# Patient Record
Sex: MALE | Race: ASIAN | NOT HISPANIC OR LATINO | URBAN - METROPOLITAN AREA
[De-identification: names, ages, dates, MRNs, and addresses within clinical notes are randomized per-mention and may not be internally consistent; named-entity substitution may affect disease eponyms.]

---

## 2023-09-02 ENCOUNTER — EMERGENCY (EMERGENCY)
Facility: HOSPITAL | Age: 53
LOS: 1 days | Discharge: HOME | End: 2023-09-02
Attending: STUDENT IN AN ORGANIZED HEALTH CARE EDUCATION/TRAINING PROGRAM | Admitting: STUDENT IN AN ORGANIZED HEALTH CARE EDUCATION/TRAINING PROGRAM
Payer: MEDICAID

## 2023-09-02 VITALS
DIASTOLIC BLOOD PRESSURE: 89 MMHG | SYSTOLIC BLOOD PRESSURE: 136 MMHG | OXYGEN SATURATION: 100 % | RESPIRATION RATE: 18 BRPM | HEART RATE: 81 BPM | TEMPERATURE: 98 F

## 2023-09-02 VITALS
RESPIRATION RATE: 18 BRPM | DIASTOLIC BLOOD PRESSURE: 97 MMHG | SYSTOLIC BLOOD PRESSURE: 142 MMHG | TEMPERATURE: 99 F | HEART RATE: 110 BPM | OXYGEN SATURATION: 98 %

## 2023-09-02 LAB
ALBUMIN SERPL ELPH-MCNC: 3.8 G/DL — SIGNIFICANT CHANGE UP (ref 3.3–5)
ALP SERPL-CCNC: 75 U/L — SIGNIFICANT CHANGE UP (ref 40–120)
ALT FLD-CCNC: 23 U/L — SIGNIFICANT CHANGE UP (ref 4–41)
ANION GAP SERPL CALC-SCNC: 15 MMOL/L — HIGH (ref 7–14)
APTT BLD: 30.9 SEC — SIGNIFICANT CHANGE UP (ref 24.5–35.6)
AST SERPL-CCNC: 20 U/L — SIGNIFICANT CHANGE UP (ref 4–40)
BASOPHILS # BLD AUTO: 0.02 K/UL — SIGNIFICANT CHANGE UP (ref 0–0.2)
BASOPHILS NFR BLD AUTO: 0.3 % — SIGNIFICANT CHANGE UP (ref 0–2)
BILIRUB SERPL-MCNC: 0.3 MG/DL — SIGNIFICANT CHANGE UP (ref 0.2–1.2)
BUN SERPL-MCNC: 13 MG/DL — SIGNIFICANT CHANGE UP (ref 7–23)
CALCIUM SERPL-MCNC: 9.2 MG/DL — SIGNIFICANT CHANGE UP (ref 8.4–10.5)
CHLORIDE SERPL-SCNC: 102 MMOL/L — SIGNIFICANT CHANGE UP (ref 98–107)
CO2 SERPL-SCNC: 19 MMOL/L — LOW (ref 22–31)
CREAT SERPL-MCNC: 1.06 MG/DL — SIGNIFICANT CHANGE UP (ref 0.5–1.3)
EGFR: 86 ML/MIN/1.73M2 — SIGNIFICANT CHANGE UP
EOSINOPHIL # BLD AUTO: 0.15 K/UL — SIGNIFICANT CHANGE UP (ref 0–0.5)
EOSINOPHIL NFR BLD AUTO: 2 % — SIGNIFICANT CHANGE UP (ref 0–6)
GLUCOSE SERPL-MCNC: 97 MG/DL — SIGNIFICANT CHANGE UP (ref 70–99)
HCT VFR BLD CALC: 42.1 % — SIGNIFICANT CHANGE UP (ref 39–50)
HGB BLD-MCNC: 14.5 G/DL — SIGNIFICANT CHANGE UP (ref 13–17)
IANC: 4.58 K/UL — SIGNIFICANT CHANGE UP (ref 1.8–7.4)
IMM GRANULOCYTES NFR BLD AUTO: 0.3 % — SIGNIFICANT CHANGE UP (ref 0–0.9)
INR BLD: 1.01 RATIO — SIGNIFICANT CHANGE UP (ref 0.85–1.18)
LYMPHOCYTES # BLD AUTO: 2.23 K/UL — SIGNIFICANT CHANGE UP (ref 1–3.3)
LYMPHOCYTES # BLD AUTO: 29.1 % — SIGNIFICANT CHANGE UP (ref 13–44)
MCHC RBC-ENTMCNC: 26.6 PG — LOW (ref 27–34)
MCHC RBC-ENTMCNC: 34.4 GM/DL — SIGNIFICANT CHANGE UP (ref 32–36)
MCV RBC AUTO: 77.2 FL — LOW (ref 80–100)
MONOCYTES # BLD AUTO: 0.67 K/UL — SIGNIFICANT CHANGE UP (ref 0–0.9)
MONOCYTES NFR BLD AUTO: 8.7 % — SIGNIFICANT CHANGE UP (ref 2–14)
NEUTROPHILS # BLD AUTO: 4.58 K/UL — SIGNIFICANT CHANGE UP (ref 1.8–7.4)
NEUTROPHILS NFR BLD AUTO: 59.6 % — SIGNIFICANT CHANGE UP (ref 43–77)
NRBC # BLD: 0 /100 WBCS — SIGNIFICANT CHANGE UP (ref 0–0)
NRBC # FLD: 0 K/UL — SIGNIFICANT CHANGE UP (ref 0–0)
NT-PROBNP SERPL-SCNC: 214 PG/ML — SIGNIFICANT CHANGE UP
PLATELET # BLD AUTO: 312 K/UL — SIGNIFICANT CHANGE UP (ref 150–400)
POTASSIUM SERPL-MCNC: 3.8 MMOL/L — SIGNIFICANT CHANGE UP (ref 3.5–5.3)
POTASSIUM SERPL-SCNC: 3.8 MMOL/L — SIGNIFICANT CHANGE UP (ref 3.5–5.3)
PROT SERPL-MCNC: 8.3 G/DL — SIGNIFICANT CHANGE UP (ref 6–8.3)
PROTHROM AB SERPL-ACNC: 11.4 SEC — SIGNIFICANT CHANGE UP (ref 9.5–13)
RBC # BLD: 5.45 M/UL — SIGNIFICANT CHANGE UP (ref 4.2–5.8)
RBC # FLD: 12.4 % — SIGNIFICANT CHANGE UP (ref 10.3–14.5)
SODIUM SERPL-SCNC: 136 MMOL/L — SIGNIFICANT CHANGE UP (ref 135–145)
TROPONIN T, HIGH SENSITIVITY RESULT: 7 NG/L — SIGNIFICANT CHANGE UP
WBC # BLD: 7.67 K/UL — SIGNIFICANT CHANGE UP (ref 3.8–10.5)
WBC # FLD AUTO: 7.67 K/UL — SIGNIFICANT CHANGE UP (ref 3.8–10.5)

## 2023-09-02 PROCEDURE — 99285 EMERGENCY DEPT VISIT HI MDM: CPT

## 2023-09-02 PROCEDURE — 93010 ELECTROCARDIOGRAM REPORT: CPT

## 2023-09-02 NOTE — ED PROVIDER NOTE - OBJECTIVE STATEMENT
51 y/o M PMH HTN c/o CP x 1-2 months. Pt states he had covid in June, began having CP, referred to pulmonary by his PCP. Pt had PFTs done (admits to syncope while performing PFTs), echo (unremarkable per pt), and CT of his chest ordered. Pt notes he developed swelling to his chest in mid July after pulm appt, notes swelling and pain have not changed over the past month and a half. Pt had CT performed 3 days ago, told he has a blood clot and advised to follow up with Dr Adolph Renteria (CT sx). While trying to make appt with Dr Renteria,  advised pt to come to ED today. Pt does not have disc or results of CT scan with him. Denies fever, chills, SOB, cough, le edema, h/o dvt/pe.

## 2023-09-02 NOTE — ED PROVIDER NOTE - NSFOLLOWUPINSTRUCTIONS_ED_ALL_ED_FT
You are leaving against medical advice. Please follow up with your PMD and any other specialist you have been advised to follow up with within 1-2 days. Please return to the emergency department at any time for evaluation or any new or worsening symptoms.

## 2023-09-02 NOTE — ED PROVIDER NOTE - ATTENDING APP SHARED VISIT CONTRIBUTION OF CARE
Hector Lopez DO:  patient seen and evaluated with the PA.  I was present for key portions of the History & Physical, and I agree with the Impression & Plan. YOM, PMH HTN, PW CP.  Patient reports 2 months ago had COVID.  Instead of following with pulmonologist, had PFT testing at which she had syncope.  Patient reports she had TTE this year which was reported normal.  Patient reports recently had a CT scan, unclear if chest abdomen or pelvis, told he had a blood clot and to follow-up with a vascular surgeon.  Patient contacted his office to attempt to get follow-up however after several failed attempts was told to come to ER.  Patient did not have these scans done in [facility].  States he is not able to obtain them over the weekend or no one can bring them to the hospital.  Patient has a set OB, N/V, CP, cough, congestion, leg swelling, history of DVT/PE.  Denies history of ACS.  Patient is HDS, well-appearing, neurovascular tach.  EKG NSR, nonischemic, independent reviewed by me.  PE as noted.  Very unlikely for this to be PE given his PCP was aware of it and did not start him on any sort of medication or send him to ER upon reading the CAT scan.  Mass over chest appears to be cyst, do not suspect acute abscess or infection.  Atypical for ACS.  Labs, imaging, reassess.  Eval for PE.  However once again this is a low suspicion.  Patient having no abdominal symptoms.

## 2023-09-02 NOTE — ED ADULT NURSE NOTE - OBJECTIVE STATEMENT
Received patient in Intake 4 c/o Received patient in Intake 4 c/o chest pain for 1-2 months, patient reports swelling of chest. Patient denies fever, chills, SOB. Patient is A&OX4, airway patent, breathing unlabored and even, radial pulses palpable. Labs obtained, 20G IV placed on left arm, awaiting CT scan. Side rails up and safety maintained. Call bells within reach.

## 2023-09-02 NOTE — ED PROVIDER NOTE - PATIENT PORTAL LINK FT
You can access the FollowMyHealth Patient Portal offered by [facility] by registering at the following website: [email or web address]. By joining [employer]’s FollowMyHealth portal, you will also be able to view your health information using other applications (apps) compatible with our system.

## 2023-09-02 NOTE — ED PROVIDER NOTE - CLINICAL SUMMARY MEDICAL DECISION MAKING FREE TEXT BOX
pt with 1-2 months of CP, told he has a blood clot outpt, also with small area of fluctuance overlying superficial mid chest  -cbc, cmp, trop, pro-bnp, cta chest; reassess

## 2023-09-02 NOTE — ED ADULT NURSE NOTE - NSFALLUNIVINTERV_ED_ALL_ED
Bed/Stretcher in lowest position, wheels locked, appropriate side rails in place/Call bell, personal items and telephone in reach/Instruct patient to call for assistance before getting out of bed/chair/stretcher/Non-slip footwear applied when patient is off stretcher/[place] to call system/Physically safe environment - no spills, clutter or unnecessary equipment/Purposeful proactive rounding/Room/bathroom lighting operational, light cord in reach

## 2023-09-02 NOTE — ED ADULT TRIAGE NOTE - CHIEF COMPLAINT QUOTE
has been getting more swollen last month has been getting more swollen last month had episode of passing out last month while doing a -->pulmonary test?

## 2023-09-02 NOTE — ED ADULT NURSE NOTE - CHIEF COMPLAINT QUOTE
has been getting more swollen last month had episode of passing out last month while doing a -->pulmonary test?

## 2023-09-02 NOTE — ED PROVIDER NOTE - PHYSICAL EXAMINATION
General: well appearing, interactive, well nourished, no apparent distress, ncat  HEENT: EOMI, PERRLA, normal mucosa, normal oropharynx, no lesions on the lips or on oral mucosa, normal external ear  Neck: supple, no lymphadenopathy, full range of motion, no nuchal rigidity  CV: RRR, normal S1 and S2 with no murmur, capillary refill less than two seconds, pp 2+   Resp: lungs CTA b/l, good aeration bilaterally, symmetric chest wall   Abd: non-distended, soft, non-tender  : no CVA tenderness  MSK: full range of motion, no cyanosis, no edema, no clubbing, no immobility  Neuro: CN II-XII grossly intact, muscle strength 5/5 in all extremities, normal gait  Skin: 2 cm fluctuant lesion over manubrium, no surrounding erythema/crepitus

## 2023-09-02 NOTE — ED PROVIDER NOTE - CARDIAC, MLM
Normal rate, regular rhythm.  Heart sounds S1, S2.  No murmurs, rubs or gallops. 2x2xm area of fluctuance with mild surrounding edema, very mild central erythema without induration or warmth.

## 2023-09-05 PROBLEM — I10 ESSENTIAL (PRIMARY) HYPERTENSION: Chronic | Status: ACTIVE | Noted: 2023-09-02

## 2023-09-11 PROBLEM — Z00.00 ENCOUNTER FOR PREVENTIVE HEALTH EXAMINATION: Status: ACTIVE | Noted: 2023-09-11

## 2023-09-13 PROBLEM — Z86.39 HISTORY OF HYPERLIPIDEMIA: Status: RESOLVED | Noted: 2023-09-13 | Resolved: 2023-09-13

## 2023-09-13 PROBLEM — Z86.11 PERSONAL HISTORY OF TUBERCULOSIS: Status: RESOLVED | Noted: 2023-09-13 | Resolved: 2023-09-13

## 2023-09-13 PROBLEM — Z86.79 HISTORY OF HYPERTENSION: Status: RESOLVED | Noted: 2023-09-13 | Resolved: 2023-09-13

## 2023-09-14 ENCOUNTER — TRANSCRIPTION ENCOUNTER (OUTPATIENT)
Age: 53
End: 2023-09-14

## 2023-09-14 ENCOUNTER — INPATIENT (INPATIENT)
Facility: HOSPITAL | Age: 53
LOS: 7 days | Discharge: HOME HEALTH | End: 2023-09-22
Attending: STUDENT IN AN ORGANIZED HEALTH CARE EDUCATION/TRAINING PROGRAM | Admitting: STUDENT IN AN ORGANIZED HEALTH CARE EDUCATION/TRAINING PROGRAM
Payer: MEDICAID

## 2023-09-14 ENCOUNTER — APPOINTMENT (OUTPATIENT)
Dept: THORACIC SURGERY | Facility: CLINIC | Age: 53
End: 2023-09-14
Payer: MEDICAID

## 2023-09-14 VITALS
TEMPERATURE: 98 F | HEART RATE: 88 BPM | RESPIRATION RATE: 15 BRPM | SYSTOLIC BLOOD PRESSURE: 122 MMHG | DIASTOLIC BLOOD PRESSURE: 85 MMHG | OXYGEN SATURATION: 100 %

## 2023-09-14 VITALS
BODY MASS INDEX: 33.63 KG/M2 | HEIGHT: 64 IN | TEMPERATURE: 96.8 F | OXYGEN SATURATION: 99 % | SYSTOLIC BLOOD PRESSURE: 122 MMHG | DIASTOLIC BLOOD PRESSURE: 89 MMHG | WEIGHT: 197 LBS | RESPIRATION RATE: 16 BRPM

## 2023-09-14 DIAGNOSIS — Z77.22 CONTACT WITH AND (SUSPECTED) EXPOSURE TO ENVIRONMENTAL TOBACCO SMOKE (ACUTE) (CHRONIC): ICD-10-CM

## 2023-09-14 DIAGNOSIS — Z86.39 PERSONAL HISTORY OF OTHER ENDOCRINE, NUTRITIONAL AND METABOLIC DISEASE: ICD-10-CM

## 2023-09-14 DIAGNOSIS — Z86.79 PERSONAL HISTORY OF OTHER DISEASES OF THE CIRCULATORY SYSTEM: ICD-10-CM

## 2023-09-14 DIAGNOSIS — Z82.49 FAMILY HISTORY OF ISCHEMIC HEART DISEASE AND OTHER DISEASES OF THE CIRCULATORY SYSTEM: ICD-10-CM

## 2023-09-14 DIAGNOSIS — Z78.9 OTHER SPECIFIED HEALTH STATUS: ICD-10-CM

## 2023-09-14 DIAGNOSIS — Z86.11 PERSONAL HISTORY OF TUBERCULOSIS: ICD-10-CM

## 2023-09-14 PROCEDURE — 99024 POSTOP FOLLOW-UP VISIT: CPT

## 2023-09-14 PROCEDURE — 99285 EMERGENCY DEPT VISIT HI MDM: CPT

## 2023-09-14 RX ORDER — PIPERACILLIN AND TAZOBACTAM 4; .5 G/20ML; G/20ML
3.38 INJECTION, POWDER, LYOPHILIZED, FOR SOLUTION INTRAVENOUS ONCE
Refills: 0 | Status: COMPLETED | OUTPATIENT
Start: 2023-09-14 | End: 2023-09-14

## 2023-09-14 RX ORDER — VANCOMYCIN HCL 1 G
1000 VIAL (EA) INTRAVENOUS ONCE
Refills: 0 | Status: COMPLETED | OUTPATIENT
Start: 2023-09-14 | End: 2023-09-14

## 2023-09-14 NOTE — ED ADULT TRIAGE NOTE - CHIEF COMPLAINT QUOTE
presents C/O mass on chest. sent in for CT. by dr. Renteria. No complaints of chest pain, headache, nausea, dizziness, vomiting  SOB, fever, chills verbalized. phx HTN.

## 2023-09-14 NOTE — ED PROVIDER NOTE - PROGRESS NOTE DETAILS
Cristela Montoya DO PGY-3  Spoke with thoracic surgery PA who states that the patient is known to Dr. Adolph Renteria who would like him to be admitted to surgery floor for concern for infected hematoma/chest abscess. Recommending CT chest with IV contrast, pre-op labs, antiobiotics vanc/zosyn. Patient is to go to the OR tomorrow morning.

## 2023-09-14 NOTE — ED PROVIDER NOTE - CLINICAL SUMMARY MEDICAL DECISION MAKING FREE TEXT BOX
Cristela Montoya DO PGY-3  52-year-old male with history of hypertension, chest wall hematoma after syncopized in pulmonologist office several months ago while performing PFTs and thereafter developed chest wall hematoma, presents with increasing pain, warmth and sent to the ER for admission to cardiothoracic surgery for incision and drainage tomorrow.  Discussed with CT surgery PA who recommends preop testing, antibiotics Vanco Zosyn, CT chest with contrast. Patient is hemodynamically stable overall, nontoxic-appearing. Cristela Montoya DO PGY-3  52-year-old male with history of hypertension, chest wall hematoma after syncopized in pulmonologist office several months ago while performing PFTs and thereafter developed chest wall hematoma, presents with increasing pain, warmth and sent to the ER for admission to cardiothoracic surgery for incision and drainage tomorrow. Hemodynamically stable, nontoxic-appearing, temp 99.  Chest wall with 4 cm x 4 cm well-circumscribed Warm fluctuance consistent with hematoma, no overlying skin erythema, induration, crepitus. Differential includes but not limited to hematoma, infected hematoma, abscess. Discussed with CT surgery PA who recommends preop testing, antibiotics Vanco Zosyn, CT chest with contrast. Will admit to CT surgery.

## 2023-09-14 NOTE — ED PROVIDER NOTE - ATTENDING CONTRIBUTION TO CARE
Noted about 4x4 area of eryythema, fluctuance, ttp, induration. Concern for abscess. Sent in by thoracic surgeon to be admitted. Labs are reviewed - no acute interventions needed in the ed. CT pending - pt is signed out

## 2023-09-15 ENCOUNTER — TRANSCRIPTION ENCOUNTER (OUTPATIENT)
Age: 53
End: 2023-09-15

## 2023-09-15 ENCOUNTER — RESULT REVIEW (OUTPATIENT)
Age: 53
End: 2023-09-15

## 2023-09-15 DIAGNOSIS — T14.8XXA OTHER INJURY OF UNSPECIFIED BODY REGION, INITIAL ENCOUNTER: ICD-10-CM

## 2023-09-15 PROBLEM — Z77.22 HISTORY OF SECOND HAND SMOKE EXPOSURE: Status: ACTIVE | Noted: 2023-09-15

## 2023-09-15 PROBLEM — Z82.49 FAMILY HISTORY OF CARDIAC DISORDER: Status: ACTIVE | Noted: 2023-09-15

## 2023-09-15 PROBLEM — Z78.9 ALCOHOL USE: Status: ACTIVE | Noted: 2023-09-15

## 2023-09-15 LAB
ALBUMIN SERPL ELPH-MCNC: 4.3 G/DL — SIGNIFICANT CHANGE UP (ref 3.3–5)
ALP SERPL-CCNC: 76 U/L — SIGNIFICANT CHANGE UP (ref 40–120)
ALT FLD-CCNC: 28 U/L — SIGNIFICANT CHANGE UP (ref 4–41)
ANION GAP SERPL CALC-SCNC: 16 MMOL/L — HIGH (ref 7–14)
APTT BLD: 29.5 SEC — SIGNIFICANT CHANGE UP (ref 24.5–35.6)
AST SERPL-CCNC: 19 U/L — SIGNIFICANT CHANGE UP (ref 4–40)
BASE EXCESS BLDV CALC-SCNC: -2 MMOL/L — SIGNIFICANT CHANGE UP (ref -2–3)
BASOPHILS # BLD AUTO: 0.04 K/UL — SIGNIFICANT CHANGE UP (ref 0–0.2)
BASOPHILS NFR BLD AUTO: 0.5 % — SIGNIFICANT CHANGE UP (ref 0–2)
BILIRUB SERPL-MCNC: 0.7 MG/DL — SIGNIFICANT CHANGE UP (ref 0.2–1.2)
BLD GP AB SCN SERPL QL: NEGATIVE — SIGNIFICANT CHANGE UP
BLD GP AB SCN SERPL QL: NEGATIVE — SIGNIFICANT CHANGE UP
BLOOD GAS VENOUS COMPREHENSIVE RESULT: SIGNIFICANT CHANGE UP
BUN SERPL-MCNC: 15 MG/DL — SIGNIFICANT CHANGE UP (ref 7–23)
CALCIUM SERPL-MCNC: 9.7 MG/DL — SIGNIFICANT CHANGE UP (ref 8.4–10.5)
CHLORIDE BLDV-SCNC: 102 MMOL/L — SIGNIFICANT CHANGE UP (ref 96–108)
CHLORIDE SERPL-SCNC: 98 MMOL/L — SIGNIFICANT CHANGE UP (ref 98–107)
CO2 BLDV-SCNC: 25 MMOL/L — SIGNIFICANT CHANGE UP (ref 22–26)
CO2 SERPL-SCNC: 22 MMOL/L — SIGNIFICANT CHANGE UP (ref 22–31)
CREAT SERPL-MCNC: 1.03 MG/DL — SIGNIFICANT CHANGE UP (ref 0.5–1.3)
EGFR: 88 ML/MIN/1.73M2 — SIGNIFICANT CHANGE UP
EOSINOPHIL # BLD AUTO: 0.16 K/UL — SIGNIFICANT CHANGE UP (ref 0–0.5)
EOSINOPHIL NFR BLD AUTO: 2.1 % — SIGNIFICANT CHANGE UP (ref 0–6)
FLUAV AG NPH QL: SIGNIFICANT CHANGE UP
FLUBV AG NPH QL: SIGNIFICANT CHANGE UP
GAS PNL BLDV: 135 MMOL/L — LOW (ref 136–145)
GLUCOSE BLDV-MCNC: 96 MG/DL — SIGNIFICANT CHANGE UP (ref 70–99)
GLUCOSE SERPL-MCNC: 92 MG/DL — SIGNIFICANT CHANGE UP (ref 70–99)
HCO3 BLDV-SCNC: 24 MMOL/L — SIGNIFICANT CHANGE UP (ref 22–29)
HCT VFR BLD CALC: 43.1 % — SIGNIFICANT CHANGE UP (ref 39–50)
HCT VFR BLDA CALC: 44 % — SIGNIFICANT CHANGE UP (ref 39–51)
HGB BLD CALC-MCNC: 14.8 G/DL — SIGNIFICANT CHANGE UP (ref 12.6–17.4)
HGB BLD-MCNC: 14.6 G/DL — SIGNIFICANT CHANGE UP (ref 13–17)
IANC: 4.53 K/UL — SIGNIFICANT CHANGE UP (ref 1.8–7.4)
IMM GRANULOCYTES NFR BLD AUTO: 0.3 % — SIGNIFICANT CHANGE UP (ref 0–0.9)
INR BLD: 1.06 RATIO — SIGNIFICANT CHANGE UP (ref 0.85–1.18)
LACTATE BLDV-MCNC: 1.7 MMOL/L — SIGNIFICANT CHANGE UP (ref 0.5–2)
LYMPHOCYTES # BLD AUTO: 2.26 K/UL — SIGNIFICANT CHANGE UP (ref 1–3.3)
LYMPHOCYTES # BLD AUTO: 29.9 % — SIGNIFICANT CHANGE UP (ref 13–44)
MCHC RBC-ENTMCNC: 26.5 PG — LOW (ref 27–34)
MCHC RBC-ENTMCNC: 33.9 GM/DL — SIGNIFICANT CHANGE UP (ref 32–36)
MCV RBC AUTO: 78.4 FL — LOW (ref 80–100)
MONOCYTES # BLD AUTO: 0.54 K/UL — SIGNIFICANT CHANGE UP (ref 0–0.9)
MONOCYTES NFR BLD AUTO: 7.2 % — SIGNIFICANT CHANGE UP (ref 2–14)
NEUTROPHILS # BLD AUTO: 4.53 K/UL — SIGNIFICANT CHANGE UP (ref 1.8–7.4)
NEUTROPHILS NFR BLD AUTO: 60 % — SIGNIFICANT CHANGE UP (ref 43–77)
NRBC # BLD: 0 /100 WBCS — SIGNIFICANT CHANGE UP (ref 0–0)
NRBC # FLD: 0 K/UL — SIGNIFICANT CHANGE UP (ref 0–0)
PCO2 BLDV: 43 MMHG — SIGNIFICANT CHANGE UP (ref 42–55)
PH BLDV: 7.35 — SIGNIFICANT CHANGE UP (ref 7.32–7.43)
PLATELET # BLD AUTO: 335 K/UL — SIGNIFICANT CHANGE UP (ref 150–400)
PO2 BLDV: 25 MMHG — SIGNIFICANT CHANGE UP (ref 25–45)
POTASSIUM BLDV-SCNC: 3.7 MMOL/L — SIGNIFICANT CHANGE UP (ref 3.5–5.1)
POTASSIUM SERPL-MCNC: 3.7 MMOL/L — SIGNIFICANT CHANGE UP (ref 3.5–5.3)
POTASSIUM SERPL-SCNC: 3.7 MMOL/L — SIGNIFICANT CHANGE UP (ref 3.5–5.3)
PROT SERPL-MCNC: 7.9 G/DL — SIGNIFICANT CHANGE UP (ref 6–8.3)
PROTHROM AB SERPL-ACNC: 11.8 SEC — SIGNIFICANT CHANGE UP (ref 9.5–13)
RBC # BLD: 5.5 M/UL — SIGNIFICANT CHANGE UP (ref 4.2–5.8)
RBC # FLD: 13 % — SIGNIFICANT CHANGE UP (ref 10.3–14.5)
RH IG SCN BLD-IMP: POSITIVE — SIGNIFICANT CHANGE UP
RH IG SCN BLD-IMP: POSITIVE — SIGNIFICANT CHANGE UP
RSV RNA NPH QL NAA+NON-PROBE: SIGNIFICANT CHANGE UP
SAO2 % BLDV: 34.7 % — LOW (ref 67–88)
SARS-COV-2 RNA SPEC QL NAA+PROBE: SIGNIFICANT CHANGE UP
SODIUM SERPL-SCNC: 136 MMOL/L — SIGNIFICANT CHANGE UP (ref 135–145)
WBC # BLD: 7.55 K/UL — SIGNIFICANT CHANGE UP (ref 3.8–10.5)
WBC # FLD AUTO: 7.55 K/UL — SIGNIFICANT CHANGE UP (ref 3.8–10.5)

## 2023-09-15 PROCEDURE — 21501 I&D DP ABSC/HMTMA SFT TS NCK: CPT

## 2023-09-15 PROCEDURE — 71260 CT THORAX DX C+: CPT | Mod: 26

## 2023-09-15 PROCEDURE — 88304 TISSUE EXAM BY PATHOLOGIST: CPT | Mod: 26

## 2023-09-15 RX ORDER — ONDANSETRON 8 MG/1
4 TABLET, FILM COATED ORAL ONCE
Refills: 0 | Status: DISCONTINUED | OUTPATIENT
Start: 2023-09-15 | End: 2023-09-16

## 2023-09-15 RX ORDER — VANCOMYCIN HCL 1 G
VIAL (EA) INTRAVENOUS
Refills: 0 | Status: DISCONTINUED | OUTPATIENT
Start: 2023-09-15 | End: 2023-09-15

## 2023-09-15 RX ORDER — OLMESARTAN MEDOXOMIL 40 MG/1
TABLET, FILM COATED ORAL
Refills: 0 | Status: ACTIVE | COMMUNITY

## 2023-09-15 RX ORDER — HEPARIN SODIUM 5000 [USP'U]/ML
5000 INJECTION INTRAVENOUS; SUBCUTANEOUS EVERY 8 HOURS
Refills: 0 | Status: DISCONTINUED | OUTPATIENT
Start: 2023-09-15 | End: 2023-09-22

## 2023-09-15 RX ORDER — VANCOMYCIN HCL 1 G
1250 VIAL (EA) INTRAVENOUS EVERY 12 HOURS
Refills: 0 | Status: DISCONTINUED | OUTPATIENT
Start: 2023-09-15 | End: 2023-09-15

## 2023-09-15 RX ORDER — FENTANYL CITRATE 50 UG/ML
25 INJECTION INTRAVENOUS
Refills: 0 | Status: DISCONTINUED | OUTPATIENT
Start: 2023-09-15 | End: 2023-09-16

## 2023-09-15 RX ORDER — INFLUENZA VIRUS VACCINE 15; 15; 15; 15 UG/.5ML; UG/.5ML; UG/.5ML; UG/.5ML
0.5 SUSPENSION INTRAMUSCULAR ONCE
Refills: 0 | Status: DISCONTINUED | OUTPATIENT
Start: 2023-09-15 | End: 2023-09-20

## 2023-09-15 RX ORDER — ATORVASTATIN CALCIUM 80 MG/1
20 TABLET, FILM COATED ORAL AT BEDTIME
Refills: 0 | Status: DISCONTINUED | OUTPATIENT
Start: 2023-09-15 | End: 2023-09-22

## 2023-09-15 RX ORDER — OLMESARTAN MEDOXOMIL 5 MG/1
1 TABLET, FILM COATED ORAL
Refills: 0 | DISCHARGE

## 2023-09-15 RX ORDER — ACETAMINOPHEN 500 MG
650 TABLET ORAL EVERY 6 HOURS
Refills: 0 | Status: DISCONTINUED | OUTPATIENT
Start: 2023-09-15 | End: 2023-09-22

## 2023-09-15 RX ORDER — ATORVASTATIN CALCIUM 80 MG/1
1 TABLET, FILM COATED ORAL
Refills: 0 | DISCHARGE

## 2023-09-15 RX ORDER — PIPERACILLIN AND TAZOBACTAM 4; .5 G/20ML; G/20ML
3.38 INJECTION, POWDER, LYOPHILIZED, FOR SOLUTION INTRAVENOUS EVERY 8 HOURS
Refills: 0 | Status: DISCONTINUED | OUTPATIENT
Start: 2023-09-15 | End: 2023-09-18

## 2023-09-15 RX ORDER — OXYCODONE HYDROCHLORIDE 5 MG/1
5 TABLET ORAL EVERY 4 HOURS
Refills: 0 | Status: DISCONTINUED | OUTPATIENT
Start: 2023-09-15 | End: 2023-09-22

## 2023-09-15 RX ORDER — VANCOMYCIN HCL 1 G
1250 VIAL (EA) INTRAVENOUS ONCE
Refills: 0 | Status: DISCONTINUED | OUTPATIENT
Start: 2023-09-15 | End: 2023-09-15

## 2023-09-15 RX ORDER — SODIUM CHLORIDE 9 MG/ML
1000 INJECTION INTRAMUSCULAR; INTRAVENOUS; SUBCUTANEOUS
Refills: 0 | Status: DISCONTINUED | OUTPATIENT
Start: 2023-09-15 | End: 2023-09-16

## 2023-09-15 RX ORDER — LOSARTAN POTASSIUM 100 MG/1
50 TABLET, FILM COATED ORAL DAILY
Refills: 0 | Status: DISCONTINUED | OUTPATIENT
Start: 2023-09-15 | End: 2023-09-22

## 2023-09-15 RX ORDER — HYDROMORPHONE HYDROCHLORIDE 2 MG/ML
0.5 INJECTION INTRAMUSCULAR; INTRAVENOUS; SUBCUTANEOUS
Refills: 0 | Status: DISCONTINUED | OUTPATIENT
Start: 2023-09-15 | End: 2023-09-16

## 2023-09-15 RX ORDER — IBUPROFEN 600 MG/1
600 TABLET, FILM COATED ORAL
Refills: 0 | Status: ACTIVE | COMMUNITY

## 2023-09-15 RX ORDER — VANCOMYCIN HCL 1 G
1250 VIAL (EA) INTRAVENOUS EVERY 12 HOURS
Refills: 0 | Status: DISCONTINUED | OUTPATIENT
Start: 2023-09-15 | End: 2023-09-18

## 2023-09-15 RX ADMIN — HYDROMORPHONE HYDROCHLORIDE 0.5 MILLIGRAM(S): 2 INJECTION INTRAMUSCULAR; INTRAVENOUS; SUBCUTANEOUS at 23:07

## 2023-09-15 RX ADMIN — Medication 250 MILLIGRAM(S): at 02:30

## 2023-09-15 RX ADMIN — PIPERACILLIN AND TAZOBACTAM 25 GRAM(S): 4; .5 INJECTION, POWDER, LYOPHILIZED, FOR SOLUTION INTRAVENOUS at 15:28

## 2023-09-15 RX ADMIN — PIPERACILLIN AND TAZOBACTAM 25 GRAM(S): 4; .5 INJECTION, POWDER, LYOPHILIZED, FOR SOLUTION INTRAVENOUS at 08:42

## 2023-09-15 RX ADMIN — HYDROMORPHONE HYDROCHLORIDE 0.5 MILLIGRAM(S): 2 INJECTION INTRAMUSCULAR; INTRAVENOUS; SUBCUTANEOUS at 23:22

## 2023-09-15 RX ADMIN — PIPERACILLIN AND TAZOBACTAM 200 GRAM(S): 4; .5 INJECTION, POWDER, LYOPHILIZED, FOR SOLUTION INTRAVENOUS at 00:02

## 2023-09-15 RX ADMIN — LOSARTAN POTASSIUM 50 MILLIGRAM(S): 100 TABLET, FILM COATED ORAL at 05:40

## 2023-09-15 RX ADMIN — ATORVASTATIN CALCIUM 20 MILLIGRAM(S): 80 TABLET, FILM COATED ORAL at 23:02

## 2023-09-15 RX ADMIN — HEPARIN SODIUM 5000 UNIT(S): 5000 INJECTION INTRAVENOUS; SUBCUTANEOUS at 23:02

## 2023-09-15 RX ADMIN — PIPERACILLIN AND TAZOBACTAM 25 GRAM(S): 4; .5 INJECTION, POWDER, LYOPHILIZED, FOR SOLUTION INTRAVENOUS at 23:37

## 2023-09-15 RX ADMIN — HEPARIN SODIUM 5000 UNIT(S): 5000 INJECTION INTRAVENOUS; SUBCUTANEOUS at 13:19

## 2023-09-15 RX ADMIN — SODIUM CHLORIDE 75 MILLILITER(S): 9 INJECTION INTRAMUSCULAR; INTRAVENOUS; SUBCUTANEOUS at 23:07

## 2023-09-15 RX ADMIN — Medication 166.67 MILLIGRAM(S): at 13:24

## 2023-09-15 NOTE — H&P ADULT - NSHPREVIEWOFSYSTEMS_GEN_ALL_CORE
General: No Weight change,  Fatigue, HA, Dizziness	  Skin/Breast: See HPI	  Ophthalmologic: Wears glasses but denies vision changes	  ENMT: No Hearing loss  Respiratory and Thorax: No Cough,  Wheezing, SOB, Hemoptysis, or Sputum production	  Cardiovascular: No SS Chest pain, Palpitations, or Diaphoresis	  Gastrointestinal: No Nausea, Vomiting, Constipation, or Appetite Change	  Genitourinary: No Dysuria or Frequency  Musculoskeletal: No weakness	  Neurological: No Seizures, TIA/ CVA	  Psychiatric: No Dementia or Depression	  Hematology/Lymphatics: No hx of bleeding or Edema	  Allergy: No Anaphylaxis or Recent illnesses

## 2023-09-15 NOTE — H&P ADULT - NSHPSOCIALHISTORY_GEN_ALL_CORE
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

## 2023-09-15 NOTE — H&P ADULT - NSICDXPASTMEDICALHX_GEN_ALL_CORE_FT
PAST MEDICAL HISTORY:  2019 novel coronavirus disease (COVID-19)     HTN (hypertension)     Hyperlipidemia      PAST MEDICAL HISTORY:  2019 novel coronavirus disease (COVID-19)     HTN (hypertension)     Hyperlipidemia     Tuberculosis

## 2023-09-15 NOTE — ED ADULT NURSE NOTE - OBJECTIVE STATEMENT
Pt. presents to 7A sent in by CT surgeon for possible infected hematoma in middle of chest. Area of raised erythema noted to anterior of mid line chest. pt. has been taking ibuprofen at home for pain. currently denies pain. pt. had syncopal episode in august at pulmonologist office during testing and every since then has complained of pain in the area. Had out patient CT that showed infected hematoma. told by CT surgeon to come to ED for surgery today. denies SOB abd pain n/v/d dysuria. LAC18G labs sent medicated per order.

## 2023-09-15 NOTE — H&P ADULT - ASSESSMENT
Assessment:   Infected chest wall hematoma/ sternal abscess    Plan:   Admit to Thoracic surgery   OR today  keep npo except meds  IV abx- Zosyn and Vanco  CT chest with IVC

## 2023-09-15 NOTE — H&P ADULT - NSHPPHYSICALEXAM_GEN_ALL_CORE
Vital Signs:  Vital Signs Last 24 Hrs  T(C): 37.3 (09-14-23 @ 22:20), Max: 37.3 (09-14-23 @ 22:20)  T(F): 99.1 (09-14-23 @ 22:20), Max: 99.1 (09-14-23 @ 22:20)  HR: 84 (09-14-23 @ 22:20) (84 - 88)  BP: 122/86 (09-14-23 @ 22:20) (122/85 - 122/86)  RR: 17 (09-14-23 @ 22:20) (15 - 17)  SpO2: 98% (09-14-23 @ 22:20) (98% - 100%) on (O2)    General: WN/WD NAD  Neurology: A&Ox3, nonfocal, MALIK x 4  Eyes: EOMI, Gross vision intact  ENT/Neck: Neck supple, trachea midline, No JVD, Gross hearing intact  Respiratory: CTA B/L, No wheezing, rales, rhonchi  Chest:: Erythematous, round 4x4 cm tender fluctuant area over the sternum at the nipple line.    CV: S1, S2    Abdominal: Soft, NT, ND +BS,   Extremities: No edema, + peripheral pulses  Skin: see chest

## 2023-09-15 NOTE — PATIENT PROFILE ADULT - FALL HARM RISK - HARM RISK INTERVENTIONS

## 2023-09-15 NOTE — H&P ADULT - HISTORY OF PRESENT ILLNESS
This 52 year old male had COVID in 6/2023 and he went to his  and a pulmonologist to evaluate his breathing afterward.  He had a syncopal episode during a PFT test and although he is unsure of having sustained any trauma, he has been c/o constant midsternal pain ever since.   Soon after the pain began, he noticed swelling at the site.  The swelling grew larger, became erythematous,  and often felt warm.  He was sent for a CT scan and was told that it was a "blood clot".  He c/o constant pain at the site and for that he has been taking Ibuprofen.  The area is tender as well.  He denies fevers.   This 52 year old male had COVID in 6/2023 and he went to his  and a pulmonologist to evaluate his breathing afterward.  He had a syncopal episode during a PFT test in 7/2023 and although he is unsure of having sustained any trauma, he has been c/o constant midsternal pain ever since.   Soon after the pain began, he noticed swelling at the site.  The swelling grew larger, became erythematous,  and often felt warm.  He was sent for a CT scan and was told that it was a "blood clot".  He c/o constant pain at the site and for that he has been taking Ibuprofen.  The area is tender as well.  He denies fevers.

## 2023-09-15 NOTE — H&P ADULT - NSHPLABSRESULTS_GEN_ALL_CORE
14.6   7.55  )-----------( 335      ( 14 Sep 2023 23:43 )             43.1     09-14    136  |  98  |  15  ----------------------------<  92  3.7   |  22  |  1.03    Ca    9.7      14 Sep 2023 23:43    TPro  7.9  /  Alb  4.3  /  TBili  0.7  /  DBili  x   /  AST  19  /  ALT  28  /  AlkPhos  76  09-14    PT/INR - ( 14 Sep 2023 23:43 )   PT: 11.8 sec;   INR: 1.06 ratio       PTT- ( 14 Sep 2023 23:43 )  PTT:29.5 sec    Rad:  CT chest pending

## 2023-09-15 NOTE — ED ADULT NURSE NOTE - TEMPLATE
SUBJECTIVE:    Patient ID: Lida Kc is a 27 y.o.male. Chief Complaint   Patient presents with    Follow-up    Testicle Pain         HPI:  Mr. Johnnie Kayser presents for follow up recent lower abdomen discomfort and \"pulling sensation\" swollen right testicle and right inguinal pain. Symptoms started after heavy lifting. He was seen at urgent care and had scrotal US. \"SCROTAL ULTRASOUND   HISTORY : Right-sided pain. Testicular swelling. PRIOR : none       COMMENTS :       Sonographic evaluation of the scrotum and testicles was obtained. Testicular size :          Right : 4.6 x 3.1 x 2.0 cm          Left : 4.3 x 2.8 x 2.1 cm   Testicles : Normal echotexture and doppler flow without mass. Epididymides : Mild asymmetrically increased size of the right epididymis without other abnormality seen. Small epididymal head cysts noted. Hydrocele : Small left   Varicocele : Small right                   Impression   IMPRESSION :       Testicles. Small right varicocele. \"  He did complete 5 days of Keflex. He has been in monogamous relationship for 3 years. He denies any urinary symptoms; no penile discharge; no fever or chills. Patient's medications, allergies, past medical, surgical, social and family histories were reviewed and updated as appropriate in electronic medical record. No outpatient medications have been marked as taking for the 10/3/22 encounter (Office Visit) with Alana Chaudhary PA-C. Review of Systems   Constitutional:  Negative for chills, fatigue and fever. HENT:  Negative for congestion, ear pain, nosebleeds and sore throat. Eyes:  Negative for pain and visual disturbance. Respiratory:  Negative for cough and shortness of breath. Cardiovascular:  Negative for chest pain and palpitations. Gastrointestinal:  Negative for abdominal pain, constipation and diarrhea. Genitourinary:  Positive for scrotal swelling.  Negative for difficulty urinating, flank pain, penile discharge, penile pain and penile swelling. Musculoskeletal:  Negative for arthralgias, gait problem and myalgias. Skin:  Negative for rash. Neurological:  Negative for syncope, light-headedness and headaches. Psychiatric/Behavioral:  Negative for behavioral problems, confusion and dysphoric mood. Past Medical History:   Diagnosis Date    ADD (attention deficit disorder)     Allergic rhinitis     Anxiety     Depression     Hypertension     OCD (obsessive compulsive disorder)     PTSD (post-traumatic stress disorder)      Past Surgical History:   Procedure Laterality Date    KNEE ARTHROSCOPY Left      Family History   Problem Relation Age of Onset    No Known Problems Mother     No Known Problems Father       Social History     Tobacco Use   Smoking Status Never   Smokeless Tobacco Current    Types: Snuff   Tobacco Comments    Dip       OBJECTIVE:   Wt Readings from Last 3 Encounters:   10/03/22 207 lb 3.2 oz (94 kg)   09/24/22 208 lb (94.3 kg)   06/07/22 206 lb 9.6 oz (93.7 kg)     BP Readings from Last 3 Encounters:   10/03/22 133/84   09/24/22 (!) 133/91   06/07/22 135/81       /84 (Site: Right Upper Arm, Position: Sitting)   Pulse 94   Temp 97 °F (36.1 °C) (Temporal)   Ht 5' 6\" (1.676 m)   Wt 207 lb 3.2 oz (94 kg)   SpO2 98%   BMI 33.44 kg/m²      Physical Exam  Vitals reviewed. Constitutional:       Appearance: Normal appearance. HENT:      Head: Normocephalic and atraumatic. Right Ear: Tympanic membrane, ear canal and external ear normal.      Left Ear: Tympanic membrane, ear canal and external ear normal.      Nose: Nose normal.      Mouth/Throat:      Mouth: Mucous membranes are moist.      Pharynx: Oropharynx is clear. Eyes:      Extraocular Movements: Extraocular movements intact. Conjunctiva/sclera: Conjunctivae normal.      Pupils: Pupils are equal, round, and reactive to light. Neck:      Vascular: No carotid bruit.    Cardiovascular:      Rate and Rhythm: Normal rate and regular rhythm. Pulses: Normal pulses. Heart sounds: Normal heart sounds. Pulmonary:      Effort: Pulmonary effort is normal.      Breath sounds: Normal breath sounds. Abdominal:      General: Bowel sounds are normal.      Palpations: Abdomen is soft. Genitourinary:     Comments: R inguinal canal TTP    Musculoskeletal:         General: Normal range of motion. Cervical back: Normal range of motion. Skin:     General: Skin is warm. Findings: No rash. Neurological:      General: No focal deficit present. Mental Status: He is alert. Psychiatric:         Mood and Affect: Mood normal.         Thought Content: Thought content normal.       Lab Results   Component Value Date/Time     06/07/2022 03:04 PM    K 4.3 06/07/2022 03:04 PM     06/07/2022 03:04 PM    CO2 26 06/07/2022 03:04 PM    GLUCOSE 85 06/07/2022 03:04 PM    BUN 14 06/07/2022 03:04 PM    CREATININE 1.0 06/07/2022 03:04 PM    CALCIUM 10.2 06/07/2022 03:04 PM    PROT 8.0 06/07/2022 03:04 PM    LABALBU 5.2 06/07/2022 03:04 PM    BILITOT 0.6 06/07/2022 03:04 PM    ALT 16 06/07/2022 03:04 PM    AST 19 06/07/2022 03:04 PM       Hemoglobin A1C (%)   Date Value   06/07/2022 5.2     LDL Calculated (mg/dL)   Date Value   01/15/2021 110         Lab Results   Component Value Date/Time    WBC 8.9 06/07/2022 03:04 PM    NEUTROABS 5.1 06/07/2022 03:04 PM    HGB 16.6 06/07/2022 03:04 PM    HCT 49.4 06/07/2022 03:04 PM    MCV 88.5 06/07/2022 03:04 PM     06/07/2022 03:04 PM       Lab Results   Component Value Date    TSH 3.88 06/07/2022         ASSESSMENT/PLAN:     1. Epididymitis  Will RO STI  - C.trachomatis N.gonorrhoeae DNA, Urine; Future    2. Right varicocele  Referral has been made to Urology    3. Right inguinal pain  RO hernia will await urology referral      No orders of the defined types were placed in this encounter. General

## 2023-09-15 NOTE — ED ADULT NURSE NOTE - NSICDXPASTMEDICALHX_GEN_ALL_CORE_FT
PAST MEDICAL HISTORY:  2019 novel coronavirus disease (COVID-19)     HTN (hypertension)     Hyperlipidemia

## 2023-09-15 NOTE — H&P ADULT - PATIENT'S PREFERRED PRONOUN
Patient came on for KRISTIE RUBIO appointment on 7/10/18. See [facility] care note.
Him/He

## 2023-09-16 LAB
ANION GAP SERPL CALC-SCNC: 16 MMOL/L — HIGH (ref 7–14)
BUN SERPL-MCNC: 13 MG/DL — SIGNIFICANT CHANGE UP (ref 7–23)
CALCIUM SERPL-MCNC: 9.3 MG/DL — SIGNIFICANT CHANGE UP (ref 8.4–10.5)
CHLORIDE SERPL-SCNC: 98 MMOL/L — SIGNIFICANT CHANGE UP (ref 98–107)
CO2 SERPL-SCNC: 18 MMOL/L — LOW (ref 22–31)
CREAT SERPL-MCNC: 1.01 MG/DL — SIGNIFICANT CHANGE UP (ref 0.5–1.3)
EGFR: 91 ML/MIN/1.73M2 — SIGNIFICANT CHANGE UP
GLUCOSE SERPL-MCNC: 155 MG/DL — HIGH (ref 70–99)
GRAM STN FLD: SIGNIFICANT CHANGE UP
HCT VFR BLD CALC: 42.5 % — SIGNIFICANT CHANGE UP (ref 39–50)
HGB BLD-MCNC: 14 G/DL — SIGNIFICANT CHANGE UP (ref 13–17)
MAGNESIUM SERPL-MCNC: 2.1 MG/DL — SIGNIFICANT CHANGE UP (ref 1.6–2.6)
MCHC RBC-ENTMCNC: 26.4 PG — LOW (ref 27–34)
MCHC RBC-ENTMCNC: 32.9 GM/DL — SIGNIFICANT CHANGE UP (ref 32–36)
MCV RBC AUTO: 80 FL — SIGNIFICANT CHANGE UP (ref 80–100)
NIGHT BLUE STAIN TISS: SIGNIFICANT CHANGE UP
NRBC # BLD: 0 /100 WBCS — SIGNIFICANT CHANGE UP (ref 0–0)
NRBC # FLD: 0 K/UL — SIGNIFICANT CHANGE UP (ref 0–0)
PHOSPHATE SERPL-MCNC: 3.7 MG/DL — SIGNIFICANT CHANGE UP (ref 2.5–4.5)
PLATELET # BLD AUTO: 335 K/UL — SIGNIFICANT CHANGE UP (ref 150–400)
POTASSIUM SERPL-MCNC: 4 MMOL/L — SIGNIFICANT CHANGE UP (ref 3.5–5.3)
POTASSIUM SERPL-SCNC: 4 MMOL/L — SIGNIFICANT CHANGE UP (ref 3.5–5.3)
RBC # BLD: 5.31 M/UL — SIGNIFICANT CHANGE UP (ref 4.2–5.8)
RBC # FLD: 13.1 % — SIGNIFICANT CHANGE UP (ref 10.3–14.5)
SODIUM SERPL-SCNC: 132 MMOL/L — LOW (ref 135–145)
SPECIMEN SOURCE: SIGNIFICANT CHANGE UP
SPECIMEN SOURCE: SIGNIFICANT CHANGE UP
WBC # BLD: 7.18 K/UL — SIGNIFICANT CHANGE UP (ref 3.8–10.5)
WBC # FLD AUTO: 7.18 K/UL — SIGNIFICANT CHANGE UP (ref 3.8–10.5)

## 2023-09-16 PROCEDURE — 71045 X-RAY EXAM CHEST 1 VIEW: CPT | Mod: 26

## 2023-09-16 RX ADMIN — OXYCODONE HYDROCHLORIDE 5 MILLIGRAM(S): 5 TABLET ORAL at 12:13

## 2023-09-16 RX ADMIN — PIPERACILLIN AND TAZOBACTAM 25 GRAM(S): 4; .5 INJECTION, POWDER, LYOPHILIZED, FOR SOLUTION INTRAVENOUS at 06:26

## 2023-09-16 RX ADMIN — Medication 166.67 MILLIGRAM(S): at 02:51

## 2023-09-16 RX ADMIN — OXYCODONE HYDROCHLORIDE 5 MILLIGRAM(S): 5 TABLET ORAL at 12:43

## 2023-09-16 RX ADMIN — ATORVASTATIN CALCIUM 20 MILLIGRAM(S): 80 TABLET, FILM COATED ORAL at 22:34

## 2023-09-16 RX ADMIN — HEPARIN SODIUM 5000 UNIT(S): 5000 INJECTION INTRAVENOUS; SUBCUTANEOUS at 14:05

## 2023-09-16 RX ADMIN — PIPERACILLIN AND TAZOBACTAM 25 GRAM(S): 4; .5 INJECTION, POWDER, LYOPHILIZED, FOR SOLUTION INTRAVENOUS at 22:33

## 2023-09-16 RX ADMIN — LOSARTAN POTASSIUM 50 MILLIGRAM(S): 100 TABLET, FILM COATED ORAL at 06:28

## 2023-09-16 RX ADMIN — Medication 166.67 MILLIGRAM(S): at 14:04

## 2023-09-16 RX ADMIN — HEPARIN SODIUM 5000 UNIT(S): 5000 INJECTION INTRAVENOUS; SUBCUTANEOUS at 06:29

## 2023-09-16 RX ADMIN — HEPARIN SODIUM 5000 UNIT(S): 5000 INJECTION INTRAVENOUS; SUBCUTANEOUS at 22:33

## 2023-09-16 RX ADMIN — OXYCODONE HYDROCHLORIDE 5 MILLIGRAM(S): 5 TABLET ORAL at 01:50

## 2023-09-16 RX ADMIN — OXYCODONE HYDROCHLORIDE 5 MILLIGRAM(S): 5 TABLET ORAL at 01:18

## 2023-09-16 RX ADMIN — PIPERACILLIN AND TAZOBACTAM 25 GRAM(S): 4; .5 INJECTION, POWDER, LYOPHILIZED, FOR SOLUTION INTRAVENOUS at 16:00

## 2023-09-16 NOTE — CHART NOTE - NSCHARTNOTEFT_GEN_A_CORE
Patient s/p I&D of chest wall abscess.  Patient resting, complaining of incisional pain.  Incision with dressing clean and dry.  Patient tolerating po, awaiting patient to void.  Vital Signs Last 24 Hrs  T(C): 36.8 (15 Sep 2023 23:30), Max: 37 (15 Sep 2023 05:30)  T(F): 98.3 (15 Sep 2023 23:30), Max: 98.6 (15 Sep 2023 05:30)  HR: 72 (15 Sep 2023 23:45) (68 - 87)  BP: 117/75 (15 Sep 2023 23:45) (109/54 - 131/85)  BP(mean): 83 (15 Sep 2023 23:45) (83 - 96)  RR: 13 (15 Sep 2023 23:45) (12 - 18)  SpO2: 96% (15 Sep 2023 23:45) (95% - 100%)    Parameters below as of 15 Sep 2023 22:35  Patient On (Oxygen Delivery Method): room air    I&O's Detail    15 Sep 2023 07:01  -  16 Sep 2023 01:14  --------------------------------------------------------  IN:    IV PiggyBack: 100 mL    sodium chloride 0.9%: 225 mL  Total IN: 325 mL    OUT:    Oral Fluid: 0 mL    Voided (mL): 900 mL  Total OUT: 900 mL    Total NET: -575 mL      MEDICATIONS  (STANDING):  atorvastatin 20 milliGRAM(s) Oral at bedtime  heparin   Injectable 5000 Unit(s) SubCutaneous every 8 hours  influenza   Vaccine 0.5 milliLiter(s) IntraMuscular once  losartan 50 milliGRAM(s) Oral daily  piperacillin/tazobactam IVPB.. 3.375 Gram(s) IV Intermittent every 8 hours  sodium chloride 0.9%. 1000 milliLiter(s) (75 mL/Hr) IV Continuous <Continuous>  vancomycin  IVPB 1250 milliGRAM(s) IV Intermittent every 12 hours    MEDICATIONS  (PRN):  acetaminophen     Tablet .. 650 milliGRAM(s) Oral every 6 hours PRN Mild Pain (1 - 3)  fentaNYL    Injectable 25 MICROGram(s) IV Push every 5 minutes PRN Moderate Pain (4 - 6)  HYDROmorphone  Injectable 0.5 milliGRAM(s) IV Push every 10 minutes PRN Severe Pain (7 - 10)  ondansetron Injectable 4 milliGRAM(s) IV Push once PRN Nausea and/or Vomiting  oxyCODONE    IR 5 milliGRAM(s) Oral every 4 hours PRN Severe Pain (7 - 10)    A/P: S/P incision and drainage of chest wall abscess   Wound packed with iodoform, possible wound vac placement in am   Pain management   F/U cultures   Continue IV antibiotics

## 2023-09-17 ENCOUNTER — TRANSCRIPTION ENCOUNTER (OUTPATIENT)
Age: 53
End: 2023-09-17

## 2023-09-17 LAB
ANION GAP SERPL CALC-SCNC: 9 MMOL/L — SIGNIFICANT CHANGE UP (ref 7–14)
BUN SERPL-MCNC: 12 MG/DL — SIGNIFICANT CHANGE UP (ref 7–23)
CALCIUM SERPL-MCNC: 9.1 MG/DL — SIGNIFICANT CHANGE UP (ref 8.4–10.5)
CHLORIDE SERPL-SCNC: 104 MMOL/L — SIGNIFICANT CHANGE UP (ref 98–107)
CO2 SERPL-SCNC: 24 MMOL/L — SIGNIFICANT CHANGE UP (ref 22–31)
CREAT SERPL-MCNC: 1.12 MG/DL — SIGNIFICANT CHANGE UP (ref 0.5–1.3)
EGFR: 80 ML/MIN/1.73M2 — SIGNIFICANT CHANGE UP
GLUCOSE SERPL-MCNC: 110 MG/DL — HIGH (ref 70–99)
HCT VFR BLD CALC: 41.6 % — SIGNIFICANT CHANGE UP (ref 39–50)
HGB BLD-MCNC: 13.6 G/DL — SIGNIFICANT CHANGE UP (ref 13–17)
MAGNESIUM SERPL-MCNC: 2.2 MG/DL — SIGNIFICANT CHANGE UP (ref 1.6–2.6)
MCHC RBC-ENTMCNC: 26.3 PG — LOW (ref 27–34)
MCHC RBC-ENTMCNC: 32.7 GM/DL — SIGNIFICANT CHANGE UP (ref 32–36)
MCV RBC AUTO: 80.3 FL — SIGNIFICANT CHANGE UP (ref 80–100)
NRBC # BLD: 0 /100 WBCS — SIGNIFICANT CHANGE UP (ref 0–0)
NRBC # FLD: 0 K/UL — SIGNIFICANT CHANGE UP (ref 0–0)
PHOSPHATE SERPL-MCNC: 3.4 MG/DL — SIGNIFICANT CHANGE UP (ref 2.5–4.5)
PLATELET # BLD AUTO: 295 K/UL — SIGNIFICANT CHANGE UP (ref 150–400)
POTASSIUM SERPL-MCNC: 3.5 MMOL/L — SIGNIFICANT CHANGE UP (ref 3.5–5.3)
POTASSIUM SERPL-SCNC: 3.5 MMOL/L — SIGNIFICANT CHANGE UP (ref 3.5–5.3)
RBC # BLD: 5.18 M/UL — SIGNIFICANT CHANGE UP (ref 4.2–5.8)
RBC # FLD: 13.1 % — SIGNIFICANT CHANGE UP (ref 10.3–14.5)
SODIUM SERPL-SCNC: 137 MMOL/L — SIGNIFICANT CHANGE UP (ref 135–145)
VANCOMYCIN TROUGH SERPL-MCNC: 12.3 UG/ML — SIGNIFICANT CHANGE UP (ref 10–20)
WBC # BLD: 6.28 K/UL — SIGNIFICANT CHANGE UP (ref 3.8–10.5)
WBC # FLD AUTO: 6.28 K/UL — SIGNIFICANT CHANGE UP (ref 3.8–10.5)

## 2023-09-17 RX ORDER — POTASSIUM CHLORIDE 20 MEQ
20 PACKET (EA) ORAL ONCE
Refills: 0 | Status: COMPLETED | OUTPATIENT
Start: 2023-09-17 | End: 2023-09-17

## 2023-09-17 RX ADMIN — OXYCODONE HYDROCHLORIDE 5 MILLIGRAM(S): 5 TABLET ORAL at 21:34

## 2023-09-17 RX ADMIN — Medication 166.67 MILLIGRAM(S): at 14:42

## 2023-09-17 RX ADMIN — Medication 166.67 MILLIGRAM(S): at 05:22

## 2023-09-17 RX ADMIN — HEPARIN SODIUM 5000 UNIT(S): 5000 INJECTION INTRAVENOUS; SUBCUTANEOUS at 05:26

## 2023-09-17 RX ADMIN — PIPERACILLIN AND TAZOBACTAM 25 GRAM(S): 4; .5 INJECTION, POWDER, LYOPHILIZED, FOR SOLUTION INTRAVENOUS at 21:04

## 2023-09-17 RX ADMIN — OXYCODONE HYDROCHLORIDE 5 MILLIGRAM(S): 5 TABLET ORAL at 10:17

## 2023-09-17 RX ADMIN — Medication 166.67 MILLIGRAM(S): at 23:44

## 2023-09-17 RX ADMIN — PIPERACILLIN AND TAZOBACTAM 25 GRAM(S): 4; .5 INJECTION, POWDER, LYOPHILIZED, FOR SOLUTION INTRAVENOUS at 06:55

## 2023-09-17 RX ADMIN — Medication 20 MILLIEQUIVALENT(S): at 10:17

## 2023-09-17 RX ADMIN — ATORVASTATIN CALCIUM 20 MILLIGRAM(S): 80 TABLET, FILM COATED ORAL at 21:04

## 2023-09-17 RX ADMIN — OXYCODONE HYDROCHLORIDE 5 MILLIGRAM(S): 5 TABLET ORAL at 21:04

## 2023-09-17 RX ADMIN — HEPARIN SODIUM 5000 UNIT(S): 5000 INJECTION INTRAVENOUS; SUBCUTANEOUS at 21:04

## 2023-09-17 RX ADMIN — PIPERACILLIN AND TAZOBACTAM 25 GRAM(S): 4; .5 INJECTION, POWDER, LYOPHILIZED, FOR SOLUTION INTRAVENOUS at 16:33

## 2023-09-17 RX ADMIN — HEPARIN SODIUM 5000 UNIT(S): 5000 INJECTION INTRAVENOUS; SUBCUTANEOUS at 15:31

## 2023-09-17 NOTE — DISCHARGE NOTE PROVIDER - NSDCFUADDAPPT_GEN_ALL_CORE_FT
See Dr Renteria in 2 weeks- call for an appointment.   See Dr Andujar in 2 weeks- call for an appointment.   See Dr Andujar in 2 weeks- call for an appointment. [phone]  See Dr. Morales (ID) in 2 weeks. Call for an apt.

## 2023-09-17 NOTE — DISCHARGE NOTE PROVIDER - NSDCMRMEDTOKEN_GEN_ALL_CORE_FT
atorvastatin 20 mg oral tablet: 1 orally once a day (at bedtime)  ibuprofen 800 mg oral tablet: 1 orally 3 times a day as needed for  severe pain  olmesartan 20 mg oral tablet: 1 orally once a day   atorvastatin 20 mg oral tablet: 1 orally once a day (at bedtime)  ibuprofen 800 mg oral tablet: 1 orally 3 times a day as needed for  severe pain  olmesartan 20 mg oral tablet: 1 orally once a day  oxyCODONE 5 mg oral tablet: 1 tab(s) orally every 4 hours (5 times/day) as needed for Severe Pain (7 - 10) MDD: 5   acetaminophen 325 mg oral tablet: 2 tab(s) orally every 6 hours As needed Mild Pain (1 - 3)  atorvastatin 20 mg oral tablet: 1 orally once a day (at bedtime)  Colace 100 mg oral capsule: 1 cap(s) orally 2 times a day  meropenem 1000 mg intravenous injection: 1,000 milligram(s) intravenously every 8 hours Until Oct 4th  olmesartan 20 mg oral tablet: 1 orally once a day  oxyCODONE 5 mg oral tablet: 1 tab(s) orally every 4 hours (5 times/day) as needed for Severe Pain (7 - 10) MDD: 5

## 2023-09-17 NOTE — DISCHARGE NOTE PROVIDER - CARE PROVIDERS DIRECT ADDRESSES
[email or web address]

## 2023-09-17 NOTE — DISCHARGE NOTE PROVIDER - NSDCCPTREATMENT_GEN_ALL_CORE_FT
PRINCIPAL PROCEDURE  Procedure: Simple incision and drainage of abscess  Findings and Treatment:

## 2023-09-17 NOTE — DISCHARGE NOTE PROVIDER - HOSPITAL COURSE
This 52 year old male had COVID in 6/2023 and he went to his  and a pulmonologist to evaluate his breathing afterward.  He had a syncopal episode during a PFT test in 7/2023 and although he is unsure of having sustained any trauma, he had c/o constant midsternal pain ever since.   Soon after the pain began, he noticed swelling at the site.  The swelling grew larger, became erythematous,  and often felt warm.  He was sent for a CT scan and was told that it was a "blood clot".  He c/o constant pain at the site and for that he had been taking Ibuprofen.  The area was tender as well.  He denied fevers.  On 9/15/23 he underwent an I & D of a chest wall abscess.  He was continued on IV Abx and a VAC dressing was placed on POD 2.  He was for discharge when his home vac was arranged and when his home abx regimen was decided upon. This 52 year old male had COVID in 6/2023 and he went to his  and a pulmonologist to evaluate his breathing afterward.  He had a syncopal episode during a PFT test in 7/2023 and although he is unsure of having sustained any trauma, he had c/o constant midsternal pain ever since.   Soon after the pain began, he noticed swelling at the site.  The swelling grew larger, became erythematous,  and often felt warm.  He was sent for a CT scan and was told that it was a "blood clot".  He c/o constant pain at the site and for that he had been taking Ibuprofen.  The area was tender as well.  He denied fevers.  On 9/15/23 he underwent an I & D of a chest wall abscess.  He was continued on IV Abx and a VAC dressing was placed on POD 2. His vac dressing was set up for home and he was cleared for DC with visiting nurse and IV abx on 9/21/23. This 52 year old male had COVID in 6/2023 and he went to his PC and a pulmonologist to evaluate his breathing afterward.  He had a syncopal episode during a PFT test in 7/2023 and although he is unsure of having sustained any trauma, he had c/o constant midsternal pain ever since.   Soon after the pain began, he noticed swelling at the site.  The swelling grew larger, became erythematous,  and often felt warm.  He was sent for a CT scan and was told that it was a "blood clot".  He c/o constant pain at the site and for that he had been taking Ibuprofen.  The area was tender as well.  He denied fevers.  On 9/15/23 he underwent an I & D of a chest wall abscess.  He was continued on IV Abx and a VAC dressing was placed on POD 2. 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 ID involved. Plan is 2 weeks IV meropenem at home with ID follow up as outpt. Pending  sensitivites of OR cultures. His vac dressing was set up for home and he was cleared for DC with visiting nurse and IV abx on 9/22/23.  VAC removed. Wet to dry dressing placed. All home care confirmed. Pt feels well. No fever, no chest pain. MInimal pain at site.   OOB and amb ad amarilys. Lungs CTA. Labs stable. Cleared for dc to home by Dr. Andujar.   Vital Signs Last 24 Hrs  T(C): 36.6 (22 Sep 2023 08:15), Max: 37.1 (21 Sep 2023 21:20)  T(F): 97.8 (22 Sep 2023 08:15), Max: 98.7 (21 Sep 2023 21:20)  HR: 97 (22 Sep 2023 08:15) (66 - 97)  BP: 133/84 (22 Sep 2023 08:15) (109/79 - 133/84)  BP(mean): --  RR: 16 (22 Sep 2023 08:15) (16 - 18)  SpO2: 98% (22 Sep 2023 08:15) (98% - 100%)    Parameters below as of 22 Sep 2023 08:15  Patient On (Oxygen Delivery Method): room air

## 2023-09-17 NOTE — DISCHARGE NOTE PROVIDER - CARE PROVIDER_API CALL
Adolph Renteria  Thoracic Surgery  [address] [place] [address] [place] [address]  Phone: [phone]  Fax: [phone]  Established Patient  Follow Up Time: 2 weeks   Misa Andujar  Thoracic Surgery  [address] [place] [address] [place] [address]  Phone: [phone]  Fax: [phone]  Established Patient  Follow Up Time: 2 weeks   Misa Andujar  Thoracic Surgery  [address] [place] [address], [place] [address]  Phone: [phone]  Fax: [phone]  Established Patient  Follow Up Time: 2 weeks    Elroy Morales  Infectious Disease  [address] [place] [id number]  Phone: [phone]  Fax: [phone]  Follow Up Time:

## 2023-09-17 NOTE — DISCHARGE NOTE PROVIDER - NSDCFUADDINST_GEN_ALL_CORE_FT
Leave the VAC dressing in place.  The visiting nurse will change it.  Call Dr Renteria if you experience fevers or chills or other unusual symptoms.   Leave the VAC dressing in place.  The visiting nurse will change it.  Call Dr Andujar if you experience fevers or chills or other unusual symptoms.   Leave the dressing in place. A visiting nurse will come to give you antibiotics and apply and change the dressing.  Please walk 4-5 x per day, Increase as tolerated. You may climb stairs. Continue to use incentive spirometer.   See Dr. Andujar's office in 1 week. Please call [phone] for an apt. Please get an CXR the day before your appointment and bring the CD with you to your follow up appointment.  Take pain pills only as needed. Please take a laxative to help support your bowel movements while on pain medication. Laxatives can be available over the counter at your local pharmacy.  Please call the office at [phone] if you have fever's chills, worsening shortness of breath, chest pain, warmth, redness or purulent discharge from the insertion site.  Leave the dressing in place. A visiting nurse will come to give you antibiotics and apply and change the VAC dressing.  Please walk 4-5 x per day, Increase as tolerated. You may climb stairs. Continue to use incentive spirometer.   Please call the office at [phone] if you have fever's chills, worsening shortness of breath, chest pain, warmth, redness or purulent discharge from the wound.  If you shower, you must completely cover the chest dressing to keep totally dry and intact.

## 2023-09-17 NOTE — DISCHARGE NOTE PROVIDER - PROVIDER TOKENS
PROVIDER:[TOKEN:[[id number]],FOLLOWUP:[2 weeks],ESTABLISHEDPATIENT:[T]] PROVIDER:[TOKEN:[[id number]],FOLLOWUP:[2 weeks],ESTABLISHEDPATIENT:[T]] PROVIDER:[TOKEN:[[id number]],FOLLOWUP:[2 weeks],ESTABLISHEDPATIENT:[T]],PROVIDER:[TOKEN:[[id number]]]

## 2023-09-18 PROCEDURE — 99222 1ST HOSP IP/OBS MODERATE 55: CPT

## 2023-09-18 PROCEDURE — 93306 TTE W/DOPPLER COMPLETE: CPT | Mod: 26

## 2023-09-18 RX ORDER — MEROPENEM 1 G/30ML
INJECTION INTRAVENOUS
Refills: 0 | Status: DISCONTINUED | OUTPATIENT
Start: 2023-09-18 | End: 2023-09-22

## 2023-09-18 RX ORDER — MEROPENEM 1 G/30ML
1000 INJECTION INTRAVENOUS ONCE
Refills: 0 | Status: COMPLETED | OUTPATIENT
Start: 2023-09-18 | End: 2023-09-18

## 2023-09-18 RX ORDER — MEROPENEM 1 G/30ML
1000 INJECTION INTRAVENOUS EVERY 8 HOURS
Refills: 0 | Status: DISCONTINUED | OUTPATIENT
Start: 2023-09-18 | End: 2023-09-22

## 2023-09-18 RX ADMIN — ATORVASTATIN CALCIUM 20 MILLIGRAM(S): 80 TABLET, FILM COATED ORAL at 22:01

## 2023-09-18 RX ADMIN — PIPERACILLIN AND TAZOBACTAM 25 GRAM(S): 4; .5 INJECTION, POWDER, LYOPHILIZED, FOR SOLUTION INTRAVENOUS at 05:43

## 2023-09-18 RX ADMIN — LOSARTAN POTASSIUM 50 MILLIGRAM(S): 100 TABLET, FILM COATED ORAL at 05:42

## 2023-09-18 RX ADMIN — OXYCODONE HYDROCHLORIDE 5 MILLIGRAM(S): 5 TABLET ORAL at 20:36

## 2023-09-18 RX ADMIN — HEPARIN SODIUM 5000 UNIT(S): 5000 INJECTION INTRAVENOUS; SUBCUTANEOUS at 05:42

## 2023-09-18 RX ADMIN — HEPARIN SODIUM 5000 UNIT(S): 5000 INJECTION INTRAVENOUS; SUBCUTANEOUS at 13:32

## 2023-09-18 RX ADMIN — MEROPENEM 100 MILLIGRAM(S): 1 INJECTION INTRAVENOUS at 22:00

## 2023-09-18 RX ADMIN — OXYCODONE HYDROCHLORIDE 5 MILLIGRAM(S): 5 TABLET ORAL at 20:06

## 2023-09-18 RX ADMIN — HEPARIN SODIUM 5000 UNIT(S): 5000 INJECTION INTRAVENOUS; SUBCUTANEOUS at 22:01

## 2023-09-18 RX ADMIN — MEROPENEM 100 MILLIGRAM(S): 1 INJECTION INTRAVENOUS at 13:32

## 2023-09-18 NOTE — SBIRT NOTE ADULT - NSSBIRTALCPOSREINDET_GEN_A_CORE
No answer, no voicemail   SW met patient at bedside and completed SBIRT screen. AUDIT score 9. Patient informed he has three 12oz beers almost daily before going to bed. He does not drink if he has something to do and drinking with interfere. Patient reported he does not need any assistance with his alcohol usage. He is able to stop without any issues.

## 2023-09-18 NOTE — CONSULT NOTE ADULT - ASSESSMENT
52 year old with travel to [facility] and COVID in July presents with progressiv chest wall pain since mid July.     CT imaging with a chest wall abscess. He is s/p I and D with purulence.  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    He does have a travel history to an area of concern.    Contact/ droplet precautions for now.    He denies sob/cough and CT is without pulmonary infiltrates. Doubt pneumonia.  Suspect soft tissue abscess    Await confirmation or Burkholderia pseudomallei.  Await sensitivities    Stop vancomycin  Change zosyn meropenem 1 gram iv q 8    Check HIV status- pt provided verbal travel.    Discussed with CT ICU 52 year old with travel to [facility] and COVID in July presents with progressiv chest wall pain since mid July.     CT imaging with a chest wall abscess. He is s/p I and D with purulence.  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    He does have a travel history to an area of concern.    He denies sob/cough and CT is without pulmonary infiltrates. Doubt pneumonia.  Suspect soft tissue abscess    Await confirmation or Burkholderia pseudomallei.  Await sensitivities    Stop vancomycin  Change zosyn meropenem 1 gram iv q 8    Check HIV status- pt provided verbal travel.    I clarified with infection prevention, can discontinue droplet and contact precautions    Discussed with CT surgery   52 year old with travel to [facility] and COVID in July presents with progressive chest wall pain since mid July.     CT imaging with a chest wall abscess. He is s/p I and D with purulence.  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    He does have a travel history to an area of concern.    He denies sob/cough and CT is without pulmonary infiltrates. Doubt pneumonia.  Suspect soft tissue abscess    Await confirmation or Burkholderia pseudomallei.  Await sensitivities    Stop vancomycin  Change zosyn meropenem 1 gram iv q 8    Check HIV status- pt provided verbal travel.    I clarified with infection prevention, can discontinue droplet and contact precautions    Discussed with CT surgery

## 2023-09-18 NOTE — CONSULT NOTE ADULT - SUBJECTIVE AND OBJECTIVE BOX
HPI:  This 52 year old male had COVID in 6/2023 and he went to his PC and a pulmonologist to evaluate his breathing afterward.      He has an extensive travel history.  [place] [facility] [place] in [place]. It [place].  He travelled to [facility] and [facility] in 5/2023- 6/2023.     He traveled to [facility] and then to [facility]- to a [facility] area called [facility].  He visited relatives but he stayed in hotels. He denies adventure activities (surfing, body boarding).  He denies animal exposure.  He denies wounds during that trip.     He was diagnosed with COVD when he returned from that trip.  He did not take antivirals or steroids. He felt ill for 3 weeks.  Subsequent to this in Mid July, substerrnal chest discomfort.     He had a syncopal episode during a PFT test in 7/2023 and although he is unsure of having sustained any trauma, he has been c/o constant midsternal pain ever since.   Soon after the pain began, he noticed swelling at the site.  The swelling grew larger, became erythematous,  and often felt warm.  He was sent for a CT scan and was told that it was a "blood clot".  He c/o constant pain at the site and for that he has been taking Ibuprofen.  The area is tender as well.  He denies fevers.       HE had a surgical I and D with purulent material.  It did not extend to bone.   He is without pain at this time.  No fever          PAST MEDICAL & SURGICAL HISTORY:  HTN (hypertension)      Hyperlipidemia      2019 novel coronavirus disease (COVID-19)      Tuberculosis      No significant past surgical history          Allergies    No Known Allergies    Intolerances        ANTIMICROBIALS:  piperacillin/tazobactam IVPB.. 3.375 every 8 hours  vancomycin  IVPB 1250 every 12 hours      OTHER MEDS:  acetaminophen     Tablet .. 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, manages [employer]    FAMILY HISTORY:  FH: CAD (coronary artery disease) (Father, Mother)        REVIEW OF SYSTEMS  [  ] ROS unobtainable because:    [ x ] All other systems negative except as noted below:	    Constitutional:  [ ] fever [ ] chills  [ ] weight loss  [ ] weakness  Skin:  [ ] rash [ ] phlebitis	  Eyes: [ ] icterus [ ] pain  [ ] discharge	  ENMT: [ ] sore throat  [ ] thrush [ ] ulcers [ ] exudates  Respiratory: [ x] dyspnea [ ] hemoptysis [ ] cough [ ] sputum	  Cardiovascular:  [ x] chest pain [ ] palpitations [ ] edema	  Gastrointestinal:  [ ] nausea [ ] vomiting [ ] diarrhea [ ] constipation [ ] pain	  Genitourinary:  [ ] dysuria [ ] frequency [ ] hematuria [ ] discharge [ ] flank pain  [ ] incontinence  Musculoskeletal:  [ ] myalgias [ ] arthralgias [ ] arthritis  [ ] back pain  Neurological:  [ ] headache [ ] seizures  [ ] confusion/altered mental status  Psychiatric:  [ ] anxiety [ ] depression	  Hematology/Lymphatics:  [ ] lymphadenopathy  Endocrine:  [ ] adrenal [ ] thyroid  Allergic/Immunologic:	 [ ] transplant [ ] seasonal    PHYSICAL EXAM:  General: x ] non-toxic  HEAD/EYES: [ ] PERRL [ x] white sclera [ ] icterus  ENT:  [ ] normal [x ] supple [ ] thrush [ ] pharyngeal exudate  Cardiovascular:   [ ] murmur [x ] normal [ ] PPM/AICD  Respiratory:  [x ] clear to ausculation bilaterally  GI:  [x ] soft, non-tender, normal bowel sounds  :  [ ] kent [ ] no CVA tenderness   Musculoskeletal:  [ ] no synovitis  Neurologic:  [ ] non-focal exam   Skin:  [x ] no rash  Lymph: [x ] no lymphadenopathy  Psychiatric:  [x ] appropriate affect [ ] alert & oriented  Lines:  [x ] no phlebitis [ ] central line          Drug Dosing Weight  Height (cm): 162.6 (15 Sep 2023 05:30)  Weight (kg): 88.2 (15 Sep 2023 05:30)  BMI (kg/m2): 33.4 (15 Sep 2023 05:30)  BSA (m2): 1.93 (15 Sep 2023 05:30)    Vital Signs Last 24 Hrs  T(F): 98 (09-18-23 @ 08:00), Max: 99.1 (09-14-23 @ 22:20)    Vital Signs Last 24 Hrs  HR: 66 (09-18-23 @ 08:00) (66 - 90)  BP: 115/75 (09-18-23 @ 08:00) (99/69 - 125/72)  RR: 16 (09-18-23 @ 08:00)  SpO2: 100% (09-18-23 @ 08:00) (96% - 100%)  Wt(kg): --                          13.6   6.28  )-----------( 295      ( 17 Sep 2023 07:03 )             41.6       09-17    137  |  104  |  12  ----------------------------<  110<H>  3.5   |  24  |  1.12    Ca    9.1      17 Sep 2023 07:03  Phos  3.4     09-17  Mg     2.20     09-17        Urinalysis Basic - ( 17 Sep 2023 07:03 )    Color: x / Appearance: x / SG: x / pH: x  Gluc: 110 mg/dL / Ketone: x  / Bili: x / Urobili: x   Blood: x / Protein: x / Nitrite: x   Leuk Esterase: x / RBC: x / WBC x   Sq Epi: x / Non Sq Epi: x / Bacteria: x        MICROBIOLOGY:  Culture - Abscess with Gram Stain (09.15.23 @ 23:05)    Gram Stain:   No polymorphonuclear leukocytes seen per low power field  No organisms seen per oil power field   Specimen Source: .Abscess CHEST WELL ABSCESS   Culture Results:   Few Gram Negative Rods  Unable to Rule Out Burkholderia pseudomallei      RADIOLOGY:    < from: CT Chest w/ IV Cont (09.15.23 @ 01:36) >  ACC: [id number] EXAM:  CT CHEST IC   ORDERED BY: ANGI RICHARDS     PROCEDURE DATE:  09/15/2023          INTERPRETATION:  CLINICAL INFORMATION: Fluctuant chest wall mass with   recent traumatic chest wall hematoma. Concerns for infected   hematoma/abscess.    COMPARISON: None.    CONTRAST/COMPLICATIONS:  IV Contrast: Omnipaque 350  40 cc administered   60 cc discarded  Oral Contrast: NONE  Complications: None reported at time of study completion    PROCEDURE:  CT of the Chest was performed.  Sagittal and coronal reformats were performed.    FINDINGS:    LUNGS AND AIRWAYS: Patent central airways.  Lungs are clear.   Juxta-pleural flat and triangular nodules compatible with intrapulmonary   lymph nodes.  PLEURA: No pleural effusion.  MEDIASTINUM AND COOPER: No lymphadenopathy.  VESSELS: Within normal limits.  HEART: Heart size is normal. No pericardial effusion. Trace coronary   calcification.  CHEST WALL AND LOWER NECK: Fluid collection measuring 4.2 x 4.2 x 7.1 cm   in the midline anterior chest wall tracks into the medial aspects of the   right and left pectoralis majors (301-47), directly abutting the anterior   sternal cortex. No underlying osseous erosion sternum.  VISUALIZED UPPER ABDOMEN: Within normal limits.  BONES: Within normal limits.    IMPRESSION:    Anterior anterior chest wall fluid collection which involves the medial   aspect of both pectoralis majors, directly abutting the sternum.    --- End of Report ---    < end of copied text >   HPI:  This 52 year old male had COVID in 6/2023 and he went to his PC and a pulmonologist to evaluate his breathing afterward.      He has an extensive travel history.  He traveled to [facility] in 1/2023. It was a [place] trip to rural area.  He travelled to [facility] and [facility] in 5/2023- 6/2023.     He traveled to [facility] and then to [facility]- to a [facility] area called [facility].  He visited relatives but he stayed in hotels. He denies adventure activities (surfing, body boarding).  He denies animal exposure.  He denies wounds during that trip.     He was diagnosed with COVD when he returned from that trip.  He did not take antivirals or steroids. He felt ill for 3 weeks.  Subsequent to this in Mid July, substerrnal chest discomfort.     He had a syncopal episode during a PFT test in 7/2023 and although he is unsure of having sustained any trauma, he has been c/o constant midsternal pain ever since.   Soon after the pain began, he noticed swelling at the site.  The swelling grew larger, became erythematous,  and often felt warm.  He was sent for a CT scan and was told that it was a "blood clot".  He c/o constant pain at the site and for that he has been taking Ibuprofen.  The area is tender as well.  He denies fevers.       HE had a surgical I and D with purulent material.  It did not extend to bone.   He is without pain at this time.  No fever          PAST MEDICAL & SURGICAL HISTORY:  HTN (hypertension)      Hyperlipidemia      2019 novel coronavirus disease (COVID-19)      Tuberculosis      No significant past surgical history          Allergies    No Known Allergies    Intolerances        ANTIMICROBIALS:  piperacillin/tazobactam IVPB.. 3.375 every 8 hours  vancomycin  IVPB 1250 every 12 hours      OTHER MEDS:  acetaminophen     Tablet .. 650 milliGRAM(s) Oral every 6 hours PRN  atorvastatin 20 milliGRAM(s) Oral at bedtime  heparin   Injectable 5000 Unit(s) SubCutaneous every 8 hours  influenza   Vaccine 0.5 milliLiter(s) IntraMuscular once  losartan 50 milliGRAM(s) Oral daily  oxyCODONE    IR 5 milliGRAM(s) Oral every 4 hours PRN      SOCIAL HISTORY:    No tobacco  3 beers per day  Works in restaurant industry, manages [employer]  Travel to [place]  Travel to [place]  [place]    FAMILY HISTORY:  FH: CAD (coronary artery disease) (Father, Mother)        REVIEW OF SYSTEMS  [  ] ROS unobtainable because:    [ x ] All other systems negative except as noted below:	    Constitutional:  [ ] fever [ ] chills  [ ] weight loss  [ ] weakness  Skin:  [ ] rash [ ] phlebitis	  Eyes: [ ] icterus [ ] pain  [ ] discharge	  ENMT: [ ] sore throat  [ ] thrush [ ] ulcers [ ] exudates  Respiratory: [ x] dyspnea [ ] hemoptysis [ ] cough [ ] sputum	  Cardiovascular:  [ x] chest pain [ ] palpitations [ ] edema	  Gastrointestinal:  [ ] nausea [ ] vomiting [ ] diarrhea [ ] constipation [ ] pain	  Genitourinary:  [ ] dysuria [ ] frequency [ ] hematuria [ ] discharge [ ] flank pain  [ ] incontinence  Musculoskeletal:  [ ] myalgias [ ] arthralgias [ ] arthritis  [ ] back pain  Neurological:  [ ] headache [ ] seizures  [ ] confusion/altered mental status  Psychiatric:  [ ] anxiety [ ] depression	  Hematology/Lymphatics:  [ ] lymphadenopathy  Endocrine:  [ ] adrenal [ ] thyroid  Allergic/Immunologic:	 [ ] transplant [ ] seasonal    PHYSICAL EXAM:  General: x ] non-toxic  HEAD/EYES: [ ] PERRL [ x] white sclera [ ] icterus  ENT:  [ ] normal [x ] supple [ ] thrush [ ] pharyngeal exudate  Cardiovascular:   [ ] murmur [x ] normal [ ] PPM/AICD  Respiratory:  [x ] clear to ausculation bilaterally  GI:  [x ] soft, non-tender, normal bowel sounds  :  [ ] kent [ ] no CVA tenderness   Musculoskeletal:  [ ] no synovitis  Neurologic:  [ ] non-focal exam   Skin:  [x ] no rash  Lymph: [x ] no lymphadenopathy  Psychiatric:  [x ] appropriate affect [ ] alert & oriented  Lines:  [x ] no phlebitis [ ] central line          Drug Dosing Weight  Height (cm): 162.6 (15 Sep 2023 05:30)  Weight (kg): 88.2 (15 Sep 2023 05:30)  BMI (kg/m2): 33.4 (15 Sep 2023 05:30)  BSA (m2): 1.93 (15 Sep 2023 05:30)    Vital Signs Last 24 Hrs  T(F): 98 (09-18-23 @ 08:00), Max: 99.1 (09-14-23 @ 22:20)    Vital Signs Last 24 Hrs  HR: 66 (09-18-23 @ 08:00) (66 - 90)  BP: 115/75 (09-18-23 @ 08:00) (99/69 - 125/72)  RR: 16 (09-18-23 @ 08:00)  SpO2: 100% (09-18-23 @ 08:00) (96% - 100%)  Wt(kg): --                          13.6   6.28  )-----------( 295      ( 17 Sep 2023 07:03 )             41.6       09-17    137  |  104  |  12  ----------------------------<  110<H>  3.5   |  24  |  1.12    Ca    9.1      17 Sep 2023 07:03  Phos  3.4     09-17  Mg     2.20     09-17        Urinalysis Basic - ( 17 Sep 2023 07:03 )    Color: x / Appearance: x / SG: x / pH: x  Gluc: 110 mg/dL / Ketone: x  / Bili: x / Urobili: x   Blood: x / Protein: x / Nitrite: x   Leuk Esterase: x / RBC: x / WBC x   Sq Epi: x / Non Sq Epi: x / Bacteria: x        MICROBIOLOGY:  Culture - Abscess with Gram Stain (09.15.23 @ 23:05)    Gram Stain:   No polymorphonuclear leukocytes seen per low power field  No organisms seen per oil power field   Specimen Source: .Abscess CHEST WELL ABSCESS   Culture Results:   Few Gram Negative Rods  Unable to Rule Out Burkholderia pseudomallei      RADIOLOGY:    < from: CT Chest w/ IV Cont (09.15.23 @ 01:36) >  ACC: [id number] EXAM:  CT CHEST IC   ORDERED BY: ANGI RICHARDS     PROCEDURE DATE:  09/15/2023          INTERPRETATION:  CLINICAL INFORMATION: Fluctuant chest wall mass with   recent traumatic chest wall hematoma. Concerns for infected   hematoma/abscess.    COMPARISON: None.    CONTRAST/COMPLICATIONS:  IV Contrast: Omnipaque 350  40 cc administered   60 cc discarded  Oral Contrast: NONE  Complications: None reported at time of study completion    PROCEDURE:  CT of the Chest was performed.  Sagittal and coronal reformats were performed.    FINDINGS:    LUNGS AND AIRWAYS: Patent central airways.  Lungs are clear.   Juxta-pleural flat and triangular nodules compatible with intrapulmonary   lymph nodes.  PLEURA: No pleural effusion.  MEDIASTINUM AND COOPER: No lymphadenopathy.  VESSELS: Within normal limits.  HEART: Heart size is normal. No pericardial effusion. Trace coronary   calcification.  CHEST WALL AND LOWER NECK: Fluid collection measuring 4.2 x 4.2 x 7.1 cm   in the midline anterior chest wall tracks into the medial aspects of the   right and left pectoralis majors (301-47), directly abutting the anterior   sternal cortex. No underlying osseous erosion sternum.  VISUALIZED UPPER ABDOMEN: Within normal limits.  BONES: Within normal limits.    IMPRESSION:    Anterior anterior chest wall fluid collection which involves the medial   aspect of both pectoralis majors, directly abutting the sternum.    --- End of Report ---    < end of copied text >

## 2023-09-18 NOTE — ADVANCED PRACTICE NURSE CONSULT - REASON FOR CONSULT
Discussed with cardiothoracic surgery that NPWT/VAC was initiated yesterday, 9/17/23 and will not need dressing change until 9/19/23 if patient is still in-house.  Discussed with cardiothoracic surgery that NPWT/VAC was initiated yesterday, 9/17/23 and will not need dressing change until 9/20/23 if patient is still in-house.

## 2023-09-19 LAB
ANION GAP SERPL CALC-SCNC: 11 MMOL/L — SIGNIFICANT CHANGE UP (ref 7–14)
BUN SERPL-MCNC: 13 MG/DL — SIGNIFICANT CHANGE UP (ref 7–23)
CALCIUM SERPL-MCNC: 9.5 MG/DL — SIGNIFICANT CHANGE UP (ref 8.4–10.5)
CHLORIDE SERPL-SCNC: 106 MMOL/L — SIGNIFICANT CHANGE UP (ref 98–107)
CO2 SERPL-SCNC: 21 MMOL/L — LOW (ref 22–31)
CREAT SERPL-MCNC: 1.06 MG/DL — SIGNIFICANT CHANGE UP (ref 0.5–1.3)
EGFR: 86 ML/MIN/1.73M2 — SIGNIFICANT CHANGE UP
GLUCOSE SERPL-MCNC: 89 MG/DL — SIGNIFICANT CHANGE UP (ref 70–99)
HCT VFR BLD CALC: 43.4 % — SIGNIFICANT CHANGE UP (ref 39–50)
HGB BLD-MCNC: 14.7 G/DL — SIGNIFICANT CHANGE UP (ref 13–17)
HIV 1+2 AB+HIV1 P24 AG SERPL QL IA: SIGNIFICANT CHANGE UP
MCHC RBC-ENTMCNC: 26.9 PG — LOW (ref 27–34)
MCHC RBC-ENTMCNC: 33.9 GM/DL — SIGNIFICANT CHANGE UP (ref 32–36)
MCV RBC AUTO: 79.5 FL — LOW (ref 80–100)
NRBC # BLD: 0 /100 WBCS — SIGNIFICANT CHANGE UP (ref 0–0)
NRBC # FLD: 0 K/UL — SIGNIFICANT CHANGE UP (ref 0–0)
PLATELET # BLD AUTO: 314 K/UL — SIGNIFICANT CHANGE UP (ref 150–400)
POTASSIUM SERPL-MCNC: 3.8 MMOL/L — SIGNIFICANT CHANGE UP (ref 3.5–5.3)
POTASSIUM SERPL-SCNC: 3.8 MMOL/L — SIGNIFICANT CHANGE UP (ref 3.5–5.3)
RBC # BLD: 5.46 M/UL — SIGNIFICANT CHANGE UP (ref 4.2–5.8)
RBC # FLD: 13.1 % — SIGNIFICANT CHANGE UP (ref 10.3–14.5)
SODIUM SERPL-SCNC: 138 MMOL/L — SIGNIFICANT CHANGE UP (ref 135–145)
WBC # BLD: 6.34 K/UL — SIGNIFICANT CHANGE UP (ref 3.8–10.5)
WBC # FLD AUTO: 6.34 K/UL — SIGNIFICANT CHANGE UP (ref 3.8–10.5)

## 2023-09-19 PROCEDURE — 71045 X-RAY EXAM CHEST 1 VIEW: CPT | Mod: 26

## 2023-09-19 PROCEDURE — 99232 SBSQ HOSP IP/OBS MODERATE 35: CPT

## 2023-09-19 RX ADMIN — MEROPENEM 100 MILLIGRAM(S): 1 INJECTION INTRAVENOUS at 21:28

## 2023-09-19 RX ADMIN — HEPARIN SODIUM 5000 UNIT(S): 5000 INJECTION INTRAVENOUS; SUBCUTANEOUS at 05:09

## 2023-09-19 RX ADMIN — OXYCODONE HYDROCHLORIDE 5 MILLIGRAM(S): 5 TABLET ORAL at 21:57

## 2023-09-19 RX ADMIN — MEROPENEM 100 MILLIGRAM(S): 1 INJECTION INTRAVENOUS at 14:09

## 2023-09-19 RX ADMIN — ATORVASTATIN CALCIUM 20 MILLIGRAM(S): 80 TABLET, FILM COATED ORAL at 21:33

## 2023-09-19 RX ADMIN — LOSARTAN POTASSIUM 50 MILLIGRAM(S): 100 TABLET, FILM COATED ORAL at 05:09

## 2023-09-19 RX ADMIN — OXYCODONE HYDROCHLORIDE 5 MILLIGRAM(S): 5 TABLET ORAL at 21:27

## 2023-09-19 RX ADMIN — MEROPENEM 100 MILLIGRAM(S): 1 INJECTION INTRAVENOUS at 05:09

## 2023-09-19 RX ADMIN — HEPARIN SODIUM 5000 UNIT(S): 5000 INJECTION INTRAVENOUS; SUBCUTANEOUS at 14:09

## 2023-09-19 RX ADMIN — HEPARIN SODIUM 5000 UNIT(S): 5000 INJECTION INTRAVENOUS; SUBCUTANEOUS at 21:28

## 2023-09-20 ENCOUNTER — TRANSCRIPTION ENCOUNTER (OUTPATIENT)
Age: 53
End: 2023-09-20

## 2023-09-20 LAB
ANION GAP SERPL CALC-SCNC: 12 MMOL/L — SIGNIFICANT CHANGE UP (ref 7–14)
BUN SERPL-MCNC: 17 MG/DL — SIGNIFICANT CHANGE UP (ref 7–23)
CALCIUM SERPL-MCNC: 9.2 MG/DL — SIGNIFICANT CHANGE UP (ref 8.4–10.5)
CHLORIDE SERPL-SCNC: 107 MMOL/L — SIGNIFICANT CHANGE UP (ref 98–107)
CO2 SERPL-SCNC: 20 MMOL/L — LOW (ref 22–31)
CREAT SERPL-MCNC: 1.1 MG/DL — SIGNIFICANT CHANGE UP (ref 0.5–1.3)
CULTURE RESULTS: SIGNIFICANT CHANGE UP
CULTURE RESULTS: SIGNIFICANT CHANGE UP
EGFR: 82 ML/MIN/1.73M2 — SIGNIFICANT CHANGE UP
GLUCOSE SERPL-MCNC: 92 MG/DL — SIGNIFICANT CHANGE UP (ref 70–99)
HCT VFR BLD CALC: 41.9 % — SIGNIFICANT CHANGE UP (ref 39–50)
HGB BLD-MCNC: 14.3 G/DL — SIGNIFICANT CHANGE UP (ref 13–17)
MAGNESIUM SERPL-MCNC: 2.2 MG/DL — SIGNIFICANT CHANGE UP (ref 1.6–2.6)
MCHC RBC-ENTMCNC: 27.3 PG — SIGNIFICANT CHANGE UP (ref 27–34)
MCHC RBC-ENTMCNC: 34.1 GM/DL — SIGNIFICANT CHANGE UP (ref 32–36)
MCV RBC AUTO: 80.1 FL — SIGNIFICANT CHANGE UP (ref 80–100)
NRBC # BLD: 0 /100 WBCS — SIGNIFICANT CHANGE UP (ref 0–0)
NRBC # FLD: 0 K/UL — SIGNIFICANT CHANGE UP (ref 0–0)
PHOSPHATE SERPL-MCNC: 3.4 MG/DL — SIGNIFICANT CHANGE UP (ref 2.5–4.5)
PLATELET # BLD AUTO: 321 K/UL — SIGNIFICANT CHANGE UP (ref 150–400)
POTASSIUM SERPL-MCNC: 3.8 MMOL/L — SIGNIFICANT CHANGE UP (ref 3.5–5.3)
POTASSIUM SERPL-SCNC: 3.8 MMOL/L — SIGNIFICANT CHANGE UP (ref 3.5–5.3)
RBC # BLD: 5.23 M/UL — SIGNIFICANT CHANGE UP (ref 4.2–5.8)
RBC # FLD: 13 % — SIGNIFICANT CHANGE UP (ref 10.3–14.5)
SODIUM SERPL-SCNC: 139 MMOL/L — SIGNIFICANT CHANGE UP (ref 135–145)
SPECIMEN SOURCE: SIGNIFICANT CHANGE UP
SPECIMEN SOURCE: SIGNIFICANT CHANGE UP
WBC # BLD: 6.4 K/UL — SIGNIFICANT CHANGE UP (ref 3.8–10.5)
WBC # FLD AUTO: 6.4 K/UL — SIGNIFICANT CHANGE UP (ref 3.8–10.5)

## 2023-09-20 PROCEDURE — 71045 X-RAY EXAM CHEST 1 VIEW: CPT | Mod: 26

## 2023-09-20 PROCEDURE — 99232 SBSQ HOSP IP/OBS MODERATE 35: CPT

## 2023-09-20 RX ADMIN — HEPARIN SODIUM 5000 UNIT(S): 5000 INJECTION INTRAVENOUS; SUBCUTANEOUS at 21:30

## 2023-09-20 RX ADMIN — MEROPENEM 100 MILLIGRAM(S): 1 INJECTION INTRAVENOUS at 05:08

## 2023-09-20 RX ADMIN — MEROPENEM 100 MILLIGRAM(S): 1 INJECTION INTRAVENOUS at 21:30

## 2023-09-20 RX ADMIN — MEROPENEM 100 MILLIGRAM(S): 1 INJECTION INTRAVENOUS at 13:16

## 2023-09-20 RX ADMIN — Medication 650 MILLIGRAM(S): at 11:40

## 2023-09-20 RX ADMIN — ATORVASTATIN CALCIUM 20 MILLIGRAM(S): 80 TABLET, FILM COATED ORAL at 21:31

## 2023-09-20 RX ADMIN — LOSARTAN POTASSIUM 50 MILLIGRAM(S): 100 TABLET, FILM COATED ORAL at 05:08

## 2023-09-20 RX ADMIN — HEPARIN SODIUM 5000 UNIT(S): 5000 INJECTION INTRAVENOUS; SUBCUTANEOUS at 05:08

## 2023-09-20 NOTE — ADVANCED PRACTICE NURSE CONSULT - REASON FOR CONSULT
Patient seen on skin care rounds for NPWT/VAC placement to midline sternum. Wound care team will continue to see patient on M W F schedule.

## 2023-09-20 NOTE — ADVANCED PRACTICE NURSE CONSULT - ASSESSMENT
Patient is aware and alert. Midline insertion along with risks, benefits, possible complications and infection prevention explained to patient who verbalized understanding. All questions addressed and patient gave verbal consent to place midline. Left arm cleansed with CHG. Using sterile technique under ultra sound guidance, placed PowerGlide Pro Midline 20G /10cm into left Cephalic vein. Brisk blood return and flushed with 20Mls of normal saline. Minimal blood loss and patient tolerated procedure well. CHG dressing placed. All sharps accounted for. Report given to district RN and ordering provider. LOT#: [id number] , REF#: [id number]
Please see A&I flowsheet for detailed assessment, thank you!

## 2023-09-20 NOTE — DISCHARGE NOTE NURSING/CASE MANAGEMENT/SOCIAL WORK - NSDCPEFALRISK_GEN_ALL_CORE
For information on Fall & Injury Prevention, visit: [email or web address] OR  [email or web address] OR  https://www.cdc.gov/steadi/patient.html

## 2023-09-20 NOTE — ADVANCED PRACTICE NURSE CONSULT - RECOMMEDATIONS
Topical Recommendations    Midline sternum: NPWT/VAC to be managed by WOCN service line M, W, F. If dressing compromised for >2 hours, please stop machine, contact CT surgery, remove dressing and place alternative dressing: Cleanse with NS, pat dry. Apply Liquid barrier film to periwound skin (allow to dry). apply hydrofiber (aquacel) to base of wound, cover with silicone foam with borders. Change daily and PRN if soiled.    Please contact Wound Care Service Line if we can be of further assistance (ext [phone]).

## 2023-09-20 NOTE — DISCHARGE NOTE NURSING/CASE MANAGEMENT/SOCIAL WORK - NSSCNAMETXT_GEN_ALL_CORE
[facility] [phone] initial visit will be day after discharge home. A nurse will call prior to the home visit.

## 2023-09-20 NOTE — DISCHARGE NOTE NURSING/CASE MANAGEMENT/SOCIAL WORK - PATIENT PORTAL LINK FT
You can access the FollowMyHealth Patient Portal offered by [facility] by registering at the following website: [email or web address]. By joining [email or web address]’s FollowMyHealth portal, you will also be able to view your health information using other applications (apps) compatible with our system.

## 2023-09-21 LAB — SURGICAL PATHOLOGY STUDY: SIGNIFICANT CHANGE UP

## 2023-09-21 PROCEDURE — 99232 SBSQ HOSP IP/OBS MODERATE 35: CPT

## 2023-09-21 RX ORDER — OXYCODONE HYDROCHLORIDE 5 MG/1
1 TABLET ORAL
Qty: 30 | Refills: 0
Start: 2023-09-21 | End: 2023-09-26

## 2023-09-21 RX ADMIN — LOSARTAN POTASSIUM 50 MILLIGRAM(S): 100 TABLET, FILM COATED ORAL at 05:12

## 2023-09-21 RX ADMIN — HEPARIN SODIUM 5000 UNIT(S): 5000 INJECTION INTRAVENOUS; SUBCUTANEOUS at 05:12

## 2023-09-21 RX ADMIN — MEROPENEM 100 MILLIGRAM(S): 1 INJECTION INTRAVENOUS at 05:12

## 2023-09-21 RX ADMIN — MEROPENEM 100 MILLIGRAM(S): 1 INJECTION INTRAVENOUS at 22:22

## 2023-09-21 RX ADMIN — ATORVASTATIN CALCIUM 20 MILLIGRAM(S): 80 TABLET, FILM COATED ORAL at 22:23

## 2023-09-21 RX ADMIN — HEPARIN SODIUM 5000 UNIT(S): 5000 INJECTION INTRAVENOUS; SUBCUTANEOUS at 22:23

## 2023-09-21 NOTE — PROGRESS NOTE ADULT - ASSESSMENT
52 year old with travel to [facility] and COVID in July presents with progressiv chest wall pain since mid July.     CT imaging with a chest wall abscess. He is s/p I and D with purulence.  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    He does have a travel history to an area of concern.  HIV negative    He denies sob/cough and CT is without pulmonary infiltrates. Doubt pneumonia.  Suspect soft tissue abscess    Await confirmation or Burkholderia pseudomallei.  Await sensitivities    Stop vancomycin  Change zosyn meropenem 1 gram iv q 8      Discussed with CT surgery  
52 year old with travel to [facility] and COVID in July presents with progressiv chest wall pain since mid July.     CT imaging with a chest wall abscess. He is s/p I and D with purulence.  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    He does have a travel history to an area of concern.  HIV negative    He denies sob/cough and CT is without pulmonary infiltrates. Doubt pneumonia.  Suspect soft tissue abscess    [facility] KATRIN confirmed as Burkholderia pseudomallei.  Sensitivities will de done by [facility]    Continue meropenem 1 gram iv q 8 through 10/6/2024  Weekly cbc, bmp fax to [phone]    Follow up as outpt   [address] [place] [id number]  [phone]  on 10/6 at 9:15 am    Discussed with CT surgery    Call ID service with questions  Will sign off    
52 year old [facility] and [facility] presents with progressiv chest wall pain since mid July.     CT imaging with a chest wall abscess. He is s/p I and D with purulence.  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    He does have a travel history to an area of concern.  HIV negative    He denies sob/cough and CT is without pulmonary infiltrates. Doubt pneumonia.  Suspect soft tissue abscess    Await confirmation or Burkholderia pseudomallei.  Sensitivities will de done by [facility]    Continue meropenem 1 gram iv q 8 through 10/6/2024  Weekly cbc, bmp fax to [phone]    Follow up as outpt   [address] [place] [address]  [phone]  on 10/6 at 9:15 am    Discussed with CT surgery    Call ID service with questions

## 2023-09-21 NOTE — PROGRESS NOTE ADULT - REASON FOR ADMISSION
Infected hematoma/ sternal abscess

## 2023-09-21 NOTE — PROGRESS NOTE ADULT - PROVIDER SPECIALTY LIST ADULT
CT Surgery
CT Surgery
Infectious Disease
Infectious Disease
Thoracic Surgery
Thoracic Surgery
Infectious Disease
Thoracic Surgery
Thoracic Surgery

## 2023-09-22 ENCOUNTER — NON-APPOINTMENT (OUTPATIENT)
Age: 53
End: 2023-09-22

## 2023-09-22 VITALS
SYSTOLIC BLOOD PRESSURE: 133 MMHG | OXYGEN SATURATION: 98 % | TEMPERATURE: 98 F | HEART RATE: 97 BPM | DIASTOLIC BLOOD PRESSURE: 84 MMHG | RESPIRATION RATE: 16 BRPM

## 2023-09-22 PROBLEM — U07.1 COVID-19: Chronic | Status: ACTIVE | Noted: 2023-09-15

## 2023-09-22 PROBLEM — E78.5 HYPERLIPIDEMIA, UNSPECIFIED: Chronic | Status: ACTIVE | Noted: 2023-09-15

## 2023-09-22 PROBLEM — A15.9 RESPIRATORY TUBERCULOSIS UNSPECIFIED: Chronic | Status: ACTIVE | Noted: 2023-09-15

## 2023-09-22 LAB
ANION GAP SERPL CALC-SCNC: 10 MMOL/L — SIGNIFICANT CHANGE UP (ref 7–14)
BUN SERPL-MCNC: 21 MG/DL — SIGNIFICANT CHANGE UP (ref 7–23)
CALCIUM SERPL-MCNC: 9.2 MG/DL — SIGNIFICANT CHANGE UP (ref 8.4–10.5)
CHLORIDE SERPL-SCNC: 106 MMOL/L — SIGNIFICANT CHANGE UP (ref 98–107)
CO2 SERPL-SCNC: 20 MMOL/L — LOW (ref 22–31)
CREAT SERPL-MCNC: 1.13 MG/DL — SIGNIFICANT CHANGE UP (ref 0.5–1.3)
EGFR: 79 ML/MIN/1.73M2 — SIGNIFICANT CHANGE UP
GLUCOSE SERPL-MCNC: 89 MG/DL — SIGNIFICANT CHANGE UP (ref 70–99)
HCT VFR BLD CALC: 42.1 % — SIGNIFICANT CHANGE UP (ref 39–50)
HGB BLD-MCNC: 14.3 G/DL — SIGNIFICANT CHANGE UP (ref 13–17)
MAGNESIUM SERPL-MCNC: 2.2 MG/DL — SIGNIFICANT CHANGE UP (ref 1.6–2.6)
MCHC RBC-ENTMCNC: 26.6 PG — LOW (ref 27–34)
MCHC RBC-ENTMCNC: 34 GM/DL — SIGNIFICANT CHANGE UP (ref 32–36)
MCV RBC AUTO: 78.4 FL — LOW (ref 80–100)
NRBC # BLD: 0 /100 WBCS — SIGNIFICANT CHANGE UP (ref 0–0)
NRBC # FLD: 0 K/UL — SIGNIFICANT CHANGE UP (ref 0–0)
PHOSPHATE SERPL-MCNC: 3.2 MG/DL — SIGNIFICANT CHANGE UP (ref 2.5–4.5)
PLATELET # BLD AUTO: 292 K/UL — SIGNIFICANT CHANGE UP (ref 150–400)
POTASSIUM SERPL-MCNC: 4 MMOL/L — SIGNIFICANT CHANGE UP (ref 3.5–5.3)
POTASSIUM SERPL-SCNC: 4 MMOL/L — SIGNIFICANT CHANGE UP (ref 3.5–5.3)
RBC # BLD: 5.37 M/UL — SIGNIFICANT CHANGE UP (ref 4.2–5.8)
RBC # FLD: 13.3 % — SIGNIFICANT CHANGE UP (ref 10.3–14.5)
SODIUM SERPL-SCNC: 136 MMOL/L — SIGNIFICANT CHANGE UP (ref 135–145)
WBC # BLD: 6.05 K/UL — SIGNIFICANT CHANGE UP (ref 3.8–10.5)
WBC # FLD AUTO: 6.05 K/UL — SIGNIFICANT CHANGE UP (ref 3.8–10.5)

## 2023-09-22 RX ORDER — MEROPENEM 1 G/30ML
1000 INJECTION INTRAVENOUS
Qty: 0 | Refills: 0 | DISCHARGE

## 2023-09-22 RX ORDER — ACETAMINOPHEN 500 MG
2 TABLET ORAL
Qty: 0 | Refills: 0 | DISCHARGE
Start: 2023-09-22

## 2023-09-22 RX ORDER — DOCUSATE SODIUM 100 MG
1 CAPSULE ORAL
Qty: 0 | Refills: 0 | DISCHARGE

## 2023-09-22 RX ORDER — IBUPROFEN 200 MG
1 TABLET ORAL
Refills: 0 | DISCHARGE

## 2023-09-22 RX ADMIN — MEROPENEM 100 MILLIGRAM(S): 1 INJECTION INTRAVENOUS at 05:22

## 2023-09-22 RX ADMIN — LOSARTAN POTASSIUM 50 MILLIGRAM(S): 100 TABLET, FILM COATED ORAL at 05:22

## 2023-09-25 ENCOUNTER — EMERGENCY (EMERGENCY)
Facility: HOSPITAL | Age: 53
LOS: 1 days | Discharge: HOME | End: 2023-09-25
Attending: EMERGENCY MEDICINE | Admitting: EMERGENCY MEDICINE
Payer: MEDICAID

## 2023-09-25 VITALS
OXYGEN SATURATION: 99 % | SYSTOLIC BLOOD PRESSURE: 146 MMHG | DIASTOLIC BLOOD PRESSURE: 100 MMHG | HEART RATE: 101 BPM | HEIGHT: 64 IN | TEMPERATURE: 98 F | RESPIRATION RATE: 20 BRPM

## 2023-09-25 PROCEDURE — 99283 EMERGENCY DEPT VISIT LOW MDM: CPT

## 2023-09-25 RX ORDER — MEROPENEM 1 G/30ML
1000 INJECTION INTRAVENOUS ONCE
Refills: 0 | Status: COMPLETED | OUTPATIENT
Start: 2023-09-25 | End: 2023-09-25

## 2023-09-25 RX ADMIN — MEROPENEM 100 MILLIGRAM(S): 1 INJECTION INTRAVENOUS at 21:11

## 2023-09-25 NOTE — ED PROVIDER NOTE - NSFOLLOWUPINSTRUCTIONS_ED_ALL_ED_FT
IV Infiltration  IV infiltration happens when fluid or medicine from your IV leaks under your skin. This can happen if the IV needle comes out of the vein or if the IV pokes through the vein to the other side.    What are the causes?  An IV infiltration can occur if:  Your IV needle is not inserted in the correct position.  Your IV needle shifts or moves after being positioned correctly.  You have inflammation or blood flow problems near the IV needle.  What are the signs or symptoms?  Symptoms of this condition include:  Pain at the IV site.  Swollen, tight, or stiff skin at the IV site.  White, red, or cool-feeling skin at the IV site.  A damp or wet bandage (dressing), if you have one.  An IV that works more slowly than normal.  Burning or loss of skin at the IV site. This may occur in severe cases.  How is this diagnosed?  This condition is diagnosed based on a physical exam of your skin near the IV site.    Your health care provider may also test the IV for problems, such as:  Medicine or fluid flowing too slowly through the IV.  Medicine or fluid continuing to flow when pressure is applied to the vein.  How is this treated?  Treatment for IV infiltration depends on the severity of your reaction and the types of medicines or fluids that you are being given through the IV. Treatment for mild infiltration may include:  Removing your IV.  Applying ice or heat to the area.  Keeping your arm raised (elevated) when at rest.  Wearing a dressing if your injection site is leaking.  Injecting medicine into the area to help your body absorb the leaking fluid.  Treatment for severe cases may require surgery to remove skin.    Follow these instructions at home:  Managing pain, stiffness, and swelling        If directed, put ice on the swollen area. To do this:  Put ice in a plastic bag.  Place a towel between your skin and the bag.  Leave the ice on for 20 minutes, 2–3 times a day.  Remove the ice if your skin turns bright red. This is very important. If you cannot feel pain, heat, or cold, you have a greater risk of damage to the area.  Elevate the swollen area above the level of your heart while you are sitting or lying down.  If directed, apply heat to the swollen area as often as told by your health care provider. Use the heat source that your health care provider recommends, such as a moist heat pack or a heating pad.  Place a towel between your skin and the heat source.  Leave the heat on for 20–30 minutes.  Remove the heat if your skin turns bright red. This is especially important if you are unable to feel pain, heat, or cold. You may have a greater risk of getting burned.  General instructions    Take over-the-counter and prescription medicines only as told by your health care provider.  Do not wear tight-fitting clothing, watches, or jewelry near the swollen area.  Contact a health care provider if:  You have a fever or chills.  Your skin at the IV site becomes swollen, red, or painful.  Your skin feels numb.  Your skin feels tight or cool to the touch.  You develop blisters around the IV site.  Your skin becomes discolored or bruised.  You cannot feel your pulse where the IV was inserted.  Get help right away if:  Your skin around the IV site turns dark and peels.  You have red streaks on your skin coming from the IV site.  You have pus coming from the IV site.  Summary  IV infiltration happens when fluid or medicine from your IV leaks under your skin.  An IV infiltration can occur if your IV needle is not inserted in the correct position or if your IV needle shifts or moves after being positioned correctly. It can also happen if you have inflammation or blood flow problems near the IV needle.  Treatment may include removing your IV, applying ice or heat to the area, and wearing a bandage (dressing) if your injection site is leaking.  Take over-the-counter and prescription medicines only as told by your health care provider.  Contact a health care provider if your skin at the IV site becomes swollen, red, or painful.  This information is not intended to replace advice given to you by your health care provider. Make sure you discuss any questions you have with your health care provider.

## 2023-09-25 NOTE — ED PROVIDER NOTE - OBJECTIVE STATEMENT
53 y/o male pmh htn, hld, recent admission for sternal abscess/hematoma s/p I&D and VAC placement, was discharged from [facility] x4 days ago with a Midline and Meropenem q8 hours. Pt was able to administer 2 doses today but then noticed that the line would not flush and he developed pain and swelling proximal to the line. Pt called his home care team who arranged to have the Midline team come to his house tomorrow. Pt went to his PMD for eval who rec he present to the ER. Pt denies cehst pain, sob, abd pain, n/v/d, numbness, tingling, weakness, dizziness, syncope, fever or chills.

## 2023-09-25 NOTE — ED ADULT NURSE NOTE - OBJECTIVE STATEMENT
53 y/o M presents to ED intake A&Ox4 c/o midline dislodgement. L upper arm midline noted, placed for IV antibiotics d/t "bacteria in my tissue." infiltrated this AM. endorsing pain to area. pt missing dose of 1G IV Meropenum. 22G to RAC, medicated as per MD orders. midline to be replaced tomorrow morning at home. awaiting DC. no acute distress noted. respirations even and unlabored

## 2023-09-25 NOTE — ED ADULT TRIAGE NOTE - CHIEF COMPLAINT QUOTE
pt coming for left arm IV midline dislodge noted this AM, for infected hematoma/sternal abscess Sx. done on 9/15/23 with Vac, IV antibx .

## 2023-09-25 NOTE — ED PROVIDER NOTE - ATTENDING APP SHARED VISIT CONTRIBUTION OF CARE
52M HTN HLD, recent sternal infected hematoma req vac dressing and ongoing IV abx for another week or two p/w LUE PICC line not working, and painful flushing today.  Pt getting meropenem and hasn't gotten his PM dose.  Mild chest wall pain but stable, thinks it's related to the vacuum from the vac.  Pt offered CDU / admission for replacement of mid line.  Pt informed we do not place mid lines in the ED.  Pt refuses to stay saying the last time he stayed it was for a week and does not want to stay.  Pt has PICC nurse who can come tomorrow and place a new midline.  Rx meropenem, OK for d/c.  RTER if unable to get it replaced.    VS:  unremarkable except tachycardia     GEN - NAD;   well appearing;   A+O x3   HEAD - NC/AT     ENT - PEERL, EOMI, mucous membranes    moist , no discharge      NECK: Neck supple, non-tender without lymphadenopathy, no masses, no JVD  PULM - CTA b/l,  symmetric breath sounds.  ACW vac dressing and wound under suction, c/d/i.  No ttp of chest wall apart from wound, no redness or warmth.    COR -  normal heart sounds    ABD - , ND, NT, soft,  BACK - no CVA tenderness, nontender spine     EXTREMS - no edema, no deformity, warm and well perfused    SKIN - no rash    or bruising      NEUROLOGIC - alert, face symmetric, speech fluent, sensation nl, motor no focal deficit.

## 2023-09-25 NOTE — ED PROVIDER NOTE - PATIENT PORTAL LINK FT
You can access the FollowMyHealth Patient Portal offered by [facility] by registering at the following website: [email or web address]. By joining [employer]’s FollowMyHealth portal, you will also be able to view your health information using other applications (apps) compatible with our system.

## 2023-09-25 NOTE — ED PROVIDER NOTE - CLINICAL SUMMARY MEDICAL DECISION MAKING FREE TEXT BOX
53 y/o male pmh htn, hld, s/p sternal abscess I&D w/ VAC at [facility], discharged x 4 days ago with midline c/o midline disfunction x today. pt was sent to the ER for eval. Midline is likely infiltrated, unable to be flushed. Explained to pt that he will likely need to be admitted to the hospital so the midline team can place another line tomorrow. Pt states that his home care team set up a midline team to come to his house tomorrow. Pt just wants to get his nighttime does of meropenem and then to be dc. Pt offered admission but refused, will obtain IV access, give 1 dose or meropenem and dc. Pt advised that he can return to the ER at any time if his midline cannot be exchanged tomorrow.

## 2023-09-27 LAB
CULTURE RESULTS: SIGNIFICANT CHANGE UP
CULTURE RESULTS: SIGNIFICANT CHANGE UP
SPECIMEN SOURCE: SIGNIFICANT CHANGE UP
SPECIMEN SOURCE: SIGNIFICANT CHANGE UP

## 2023-10-02 ENCOUNTER — EMERGENCY (EMERGENCY)
Facility: HOSPITAL | Age: 53
LOS: 1 days | Discharge: HOME | End: 2023-10-02
Attending: EMERGENCY MEDICINE | Admitting: EMERGENCY MEDICINE
Payer: MEDICAID

## 2023-10-02 ENCOUNTER — APPOINTMENT (OUTPATIENT)
Dept: THORACIC SURGERY | Facility: CLINIC | Age: 53
End: 2023-10-02
Payer: MEDICAID

## 2023-10-02 VITALS
SYSTOLIC BLOOD PRESSURE: 181 MMHG | HEART RATE: 108 BPM | RESPIRATION RATE: 18 BRPM | TEMPERATURE: 98 F | OXYGEN SATURATION: 100 % | DIASTOLIC BLOOD PRESSURE: 93 MMHG | HEIGHT: 64 IN

## 2023-10-02 VITALS
OXYGEN SATURATION: 100 % | RESPIRATION RATE: 18 BRPM | DIASTOLIC BLOOD PRESSURE: 89 MMHG | HEART RATE: 87 BPM | SYSTOLIC BLOOD PRESSURE: 117 MMHG | TEMPERATURE: 98 F

## 2023-10-02 LAB
ALBUMIN SERPL ELPH-MCNC: 4.5 G/DL — SIGNIFICANT CHANGE UP (ref 3.3–5)
ALP SERPL-CCNC: 74 U/L — SIGNIFICANT CHANGE UP (ref 40–120)
ALT FLD-CCNC: 45 U/L — HIGH (ref 4–41)
ANION GAP SERPL CALC-SCNC: 12 MMOL/L — SIGNIFICANT CHANGE UP (ref 7–14)
AST SERPL-CCNC: 28 U/L — SIGNIFICANT CHANGE UP (ref 4–40)
BASOPHILS # BLD AUTO: 0.03 K/UL — SIGNIFICANT CHANGE UP (ref 0–0.2)
BASOPHILS NFR BLD AUTO: 0.5 % — SIGNIFICANT CHANGE UP (ref 0–2)
BILIRUB SERPL-MCNC: 0.5 MG/DL — SIGNIFICANT CHANGE UP (ref 0.2–1.2)
BUN SERPL-MCNC: 15 MG/DL — SIGNIFICANT CHANGE UP (ref 7–23)
CALCIUM SERPL-MCNC: 10.1 MG/DL — SIGNIFICANT CHANGE UP (ref 8.4–10.5)
CHLORIDE SERPL-SCNC: 102 MMOL/L — SIGNIFICANT CHANGE UP (ref 98–107)
CO2 SERPL-SCNC: 22 MMOL/L — SIGNIFICANT CHANGE UP (ref 22–31)
CREAT SERPL-MCNC: 0.94 MG/DL — SIGNIFICANT CHANGE UP (ref 0.5–1.3)
EGFR: 99 ML/MIN/1.73M2 — SIGNIFICANT CHANGE UP
EOSINOPHIL # BLD AUTO: 0.12 K/UL — SIGNIFICANT CHANGE UP (ref 0–0.5)
EOSINOPHIL NFR BLD AUTO: 1.9 % — SIGNIFICANT CHANGE UP (ref 0–6)
GLUCOSE SERPL-MCNC: 122 MG/DL — HIGH (ref 70–99)
HCT VFR BLD CALC: 43.5 % — SIGNIFICANT CHANGE UP (ref 39–50)
HGB BLD-MCNC: 14.8 G/DL — SIGNIFICANT CHANGE UP (ref 13–17)
IANC: 4.15 K/UL — SIGNIFICANT CHANGE UP (ref 1.8–7.4)
IMM GRANULOCYTES NFR BLD AUTO: 0.2 % — SIGNIFICANT CHANGE UP (ref 0–0.9)
LYMPHOCYTES # BLD AUTO: 1.69 K/UL — SIGNIFICANT CHANGE UP (ref 1–3.3)
LYMPHOCYTES # BLD AUTO: 27.4 % — SIGNIFICANT CHANGE UP (ref 13–44)
MCHC RBC-ENTMCNC: 26.5 PG — LOW (ref 27–34)
MCHC RBC-ENTMCNC: 34 GM/DL — SIGNIFICANT CHANGE UP (ref 32–36)
MCV RBC AUTO: 78 FL — LOW (ref 80–100)
MONOCYTES # BLD AUTO: 0.16 K/UL — SIGNIFICANT CHANGE UP (ref 0–0.9)
MONOCYTES NFR BLD AUTO: 2.6 % — SIGNIFICANT CHANGE UP (ref 2–14)
NEUTROPHILS # BLD AUTO: 4.15 K/UL — SIGNIFICANT CHANGE UP (ref 1.8–7.4)
NEUTROPHILS NFR BLD AUTO: 67.4 % — SIGNIFICANT CHANGE UP (ref 43–77)
NRBC # BLD: 0 /100 WBCS — SIGNIFICANT CHANGE UP (ref 0–0)
NRBC # FLD: 0 K/UL — SIGNIFICANT CHANGE UP (ref 0–0)
PLATELET # BLD AUTO: 328 K/UL — SIGNIFICANT CHANGE UP (ref 150–400)
POTASSIUM SERPL-MCNC: 3.6 MMOL/L — SIGNIFICANT CHANGE UP (ref 3.5–5.3)
POTASSIUM SERPL-SCNC: 3.6 MMOL/L — SIGNIFICANT CHANGE UP (ref 3.5–5.3)
PROT SERPL-MCNC: 8.1 G/DL — SIGNIFICANT CHANGE UP (ref 6–8.3)
RBC # BLD: 5.58 M/UL — SIGNIFICANT CHANGE UP (ref 4.2–5.8)
RBC # FLD: 13 % — SIGNIFICANT CHANGE UP (ref 10.3–14.5)
SODIUM SERPL-SCNC: 136 MMOL/L — SIGNIFICANT CHANGE UP (ref 135–145)
WBC # BLD: 6.16 K/UL — SIGNIFICANT CHANGE UP (ref 3.8–10.5)
WBC # FLD AUTO: 6.16 K/UL — SIGNIFICANT CHANGE UP (ref 3.8–10.5)

## 2023-10-02 PROCEDURE — 99284 EMERGENCY DEPT VISIT MOD MDM: CPT

## 2023-10-02 PROCEDURE — 93010 ELECTROCARDIOGRAM REPORT: CPT

## 2023-10-02 PROCEDURE — 99213 OFFICE O/P EST LOW 20 MIN: CPT | Mod: 24

## 2023-10-02 RX ORDER — MEROPENEM 1 G/30ML
1000 INJECTION INTRAVENOUS ONCE
Refills: 0 | Status: DISCONTINUED | OUTPATIENT
Start: 2023-10-02 | End: 2023-10-02

## 2023-10-02 RX ORDER — MEROPENEM 1 G/30ML
1000 INJECTION INTRAVENOUS ONCE
Refills: 0 | Status: COMPLETED | OUTPATIENT
Start: 2023-10-02 | End: 2023-10-02

## 2023-10-02 RX ADMIN — MEROPENEM 100 MILLIGRAM(S): 1 INJECTION INTRAVENOUS at 13:21

## 2023-10-02 NOTE — ED PROVIDER NOTE - PHYSICAL EXAMINATION
Physical Exam:  Gen: NAD, non-toxic appearing  Head: NCAT  HEENT: Normal conjunctiva, trachea midline, moist mucous membranes  Chest wall: wound vac in place  Lung: CTAB, no respiratory distress, no wheezes/rhonchi/rales B/L, speaking in full sentences  CV: RRR, no murmurs, rubs or gallops  Abd: Soft, NT, ND, no guarding, rigidity, rebound tenderness  MSK: No visible deformities, moves all four extremities   Neuro: No focal motor deficits  Skin: Warm, well perfused, no visible rashes, no leg swelling  Psych: appropriate affect and mood

## 2023-10-02 NOTE — ED PROVIDER NOTE - PATIENT PORTAL LINK FT
You can access the FollowMyHealth Patient Portal offered by [facility] by registering at the following website: [email or web address]. By joining [employer]’s FollowMyHealth portal, you will also be able to view your health information using other applications (apps) compatible with our system.

## 2023-10-02 NOTE — ED PROVIDER NOTE - NSICDXPASTMEDICALHX_GEN_ALL_CORE_FT
PAST MEDICAL HISTORY:  2019 novel coronavirus disease (COVID-19)     HTN (hypertension)     Hyperlipidemia     Tuberculosis
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
## 2023-10-02 NOTE — ED PROVIDER NOTE - NSFOLLOWUPINSTRUCTIONS_ED_ALL_ED_FT
You were evaluated in the emergency department for high potassium level your lab values. Your lab values here were normal.    Please follow-up with your infectious disease team for continued routine testing.     Please return to the emergency department if you experience chest pain, shortness of breath, palpitations, or any other concerning features.

## 2023-10-02 NOTE — ED PROVIDER NOTE - CLINICAL SUMMARY MEDICAL DECISION MAKING FREE TEXT BOX
51 y/o male pmh htn, hld, recent admission for sternal abscess/hematoma s/p I&D and VAC placement, who presents for an elevated potassium during routine home lab work. Plan for CBC, CMP, and 1 g of meropenem.  Plan for EKG.  We will most likely discharge home.

## 2023-10-02 NOTE — ED PROVIDER NOTE - ATTENDING CONTRIBUTION TO CARE
DR. PERAZA, ATTENDING MD-  I performed a face to face bedside interview with the patient regarding history of present illness, review of symptoms and past medical history. I completed an independent physical exam.  I have discussed the patient's plan of care with the resident.   Documentation as above in the note.    53 y/o male h/o sternum abscess with wound vac in place receiving home meropenem sent by pcp for elevated k on labs drawn three days ago.  Pt feels at [facility], no complaints.  Obtain ekg cbc bmp, pt requests scheduled meropenem dose, will order.  Reassess.

## 2023-10-02 NOTE — ED PROVIDER NOTE - OBJECTIVE STATEMENT
53 y/o male pmh htn, hld, recent admission for sternal abscess/hematoma s/p I&D and VAC placement, who presents for an elevated potassium during routine home lab work.  He was called by an assistant to the his infectious disease doctor and told he had an elevated potassium, he was not told how much his potassium was.  He has no prior history of hypo or hyperkalemia.  He denies any chest pain, shortness of breath, palpitations, chest pain. Denies abdominal pain, nausea, vomiting. He requested to have his 1 g of meropenem while he was in the emergency department.

## 2023-10-06 ENCOUNTER — APPOINTMENT (OUTPATIENT)
Dept: INFECTIOUS DISEASE | Facility: CLINIC | Age: 53
End: 2023-10-06
Payer: MEDICAID

## 2023-10-06 ENCOUNTER — OUTPATIENT (OUTPATIENT)
Dept: OUTPATIENT SERVICES | Facility: HOSPITAL | Age: 53
LOS: 1 days | End: 2023-10-06
Payer: MEDICAID

## 2023-10-06 VITALS
WEIGHT: 195 LBS | TEMPERATURE: 98.3 F | OXYGEN SATURATION: 97 % | DIASTOLIC BLOOD PRESSURE: 80 MMHG | BODY MASS INDEX: 33.29 KG/M2 | HEART RATE: 117 BPM | SYSTOLIC BLOOD PRESSURE: 113 MMHG | HEIGHT: 64 IN

## 2023-10-06 DIAGNOSIS — L02.213 CUTANEOUS ABSCESS OF CHEST WALL: ICD-10-CM

## 2023-10-06 DIAGNOSIS — B97.89 OTHER VIRAL AGENTS AS THE CAUSE OF DISEASES CLASSIFIED ELSEWHERE: ICD-10-CM

## 2023-10-06 DIAGNOSIS — A49.8 OTHER BACTERIAL INFECTIONS OF UNSPECIFIED SITE: ICD-10-CM

## 2023-10-06 DIAGNOSIS — B99.9 UNSPECIFIED INFECTIOUS DISEASE: ICD-10-CM

## 2023-10-06 PROCEDURE — G0463: CPT

## 2023-10-06 PROCEDURE — 99214 OFFICE O/P EST MOD 30 MIN: CPT

## 2023-10-13 ENCOUNTER — NON-APPOINTMENT (OUTPATIENT)
Age: 53
End: 2023-10-13

## 2023-10-20 LAB
ALBUMIN SERPL ELPH-MCNC: 4.6 G/DL
ALBUMIN SERPL ELPH-MCNC: 4.6 G/DL
ALP BLD-CCNC: 84 U/L
ALP BLD-CCNC: 86 U/L
ALT SERPL-CCNC: 38 U/L
ALT SERPL-CCNC: 42 U/L
ANION GAP SERPL CALC-SCNC: 13 MMOL/L
ANION GAP SERPL CALC-SCNC: 14 MMOL/L
AST SERPL-CCNC: 23 U/L
AST SERPL-CCNC: 25 U/L
BASOPHILS # BLD AUTO: 0.04 K/UL
BASOPHILS # BLD AUTO: 0.05 K/UL
BASOPHILS NFR BLD AUTO: 0.7 %
BASOPHILS NFR BLD AUTO: 0.9 %
BILIRUB SERPL-MCNC: 0.3 MG/DL
BILIRUB SERPL-MCNC: 0.3 MG/DL
BUN SERPL-MCNC: 12 MG/DL
BUN SERPL-MCNC: 15 MG/DL
CALCIUM SERPL-MCNC: 9.4 MG/DL
CALCIUM SERPL-MCNC: 9.8 MG/DL
CHLORIDE SERPL-SCNC: 102 MMOL/L
CHLORIDE SERPL-SCNC: 99 MMOL/L
CO2 SERPL-SCNC: 20 MMOL/L
CO2 SERPL-SCNC: 20 MMOL/L
CREAT SERPL-MCNC: 1.12 MG/DL
CREAT SERPL-MCNC: 1.2 MG/DL
EGFR: 74 ML/MIN/1.73M2
EGFR: 80 ML/MIN/1.73M2
EOSINOPHIL # BLD AUTO: 0.38 K/UL
EOSINOPHIL # BLD AUTO: 0.44 K/UL
EOSINOPHIL NFR BLD AUTO: 6.7 %
EOSINOPHIL NFR BLD AUTO: 7.6 %
GLUCOSE SERPL-MCNC: 87 MG/DL
GLUCOSE SERPL-MCNC: 98 MG/DL
HCT VFR BLD CALC: 42.3 %
HCT VFR BLD CALC: 46.5 %
HGB BLD-MCNC: 14.1 G/DL
HGB BLD-MCNC: 15.1 G/DL
IMM GRANULOCYTES NFR BLD AUTO: 0.2 %
IMM GRANULOCYTES NFR BLD AUTO: 0.2 %
LYMPHOCYTES # BLD AUTO: 1.76 K/UL
LYMPHOCYTES # BLD AUTO: 1.99 K/UL
LYMPHOCYTES NFR BLD AUTO: 30.8 %
LYMPHOCYTES NFR BLD AUTO: 34.3 %
MAN DIFF?: NORMAL
MAN DIFF?: NORMAL
MCHC RBC-ENTMCNC: 26.3 PG
MCHC RBC-ENTMCNC: 27 PG
MCHC RBC-ENTMCNC: 32.5 GM/DL
MCHC RBC-ENTMCNC: 33.3 GM/DL
MCV RBC AUTO: 80.9 FL
MCV RBC AUTO: 81 FL
MONOCYTES # BLD AUTO: 0.53 K/UL
MONOCYTES # BLD AUTO: 0.59 K/UL
MONOCYTES NFR BLD AUTO: 10.2 %
MONOCYTES NFR BLD AUTO: 9.3 %
NEUTROPHILS # BLD AUTO: 2.72 K/UL
NEUTROPHILS # BLD AUTO: 2.99 K/UL
NEUTROPHILS NFR BLD AUTO: 46.8 %
NEUTROPHILS NFR BLD AUTO: 52.3 %
PLATELET # BLD AUTO: 280 K/UL
PLATELET # BLD AUTO: 328 K/UL
POTASSIUM SERPL-SCNC: 4 MMOL/L
POTASSIUM SERPL-SCNC: 4.1 MMOL/L
PROT SERPL-MCNC: 7.8 G/DL
PROT SERPL-MCNC: 8 G/DL
RBC # BLD: 5.23 M/UL
RBC # BLD: 5.74 M/UL
RBC # FLD: 13.9 %
RBC # FLD: 14.2 %
SODIUM SERPL-SCNC: 133 MMOL/L
SODIUM SERPL-SCNC: 135 MMOL/L
WBC # FLD AUTO: 5.71 K/UL
WBC # FLD AUTO: 5.8 K/UL

## 2023-10-23 ENCOUNTER — APPOINTMENT (OUTPATIENT)
Dept: THORACIC SURGERY | Facility: CLINIC | Age: 53
End: 2023-10-23
Payer: MEDICAID

## 2023-10-23 VITALS
SYSTOLIC BLOOD PRESSURE: 114 MMHG | RESPIRATION RATE: 16 BRPM | OXYGEN SATURATION: 98 % | HEIGHT: 64 IN | DIASTOLIC BLOOD PRESSURE: 75 MMHG | HEART RATE: 88 BPM | BODY MASS INDEX: 33.63 KG/M2 | WEIGHT: 197 LBS

## 2023-10-23 PROCEDURE — 99024 POSTOP FOLLOW-UP VISIT: CPT

## 2023-11-13 ENCOUNTER — APPOINTMENT (OUTPATIENT)
Dept: THORACIC SURGERY | Facility: CLINIC | Age: 53
End: 2023-11-13
Payer: MEDICAID

## 2023-11-13 VITALS
RESPIRATION RATE: 16 BRPM | OXYGEN SATURATION: 98 % | HEIGHT: 64 IN | DIASTOLIC BLOOD PRESSURE: 78 MMHG | SYSTOLIC BLOOD PRESSURE: 119 MMHG | HEART RATE: 110 BPM | WEIGHT: 196 LBS | BODY MASS INDEX: 33.46 KG/M2

## 2023-11-13 PROCEDURE — 99024 POSTOP FOLLOW-UP VISIT: CPT

## 2023-12-12 ENCOUNTER — RX RENEWAL (OUTPATIENT)
Age: 53
End: 2023-12-12

## 2023-12-12 RX ORDER — SULFAMETHOXAZOLE AND TRIMETHOPRIM 800; 160 MG/1; MG/1
800-160 TABLET ORAL TWICE DAILY
Qty: 120 | Refills: 1 | Status: ACTIVE | COMMUNITY
Start: 2023-10-05 | End: 1900-01-01

## 2023-12-20 ENCOUNTER — APPOINTMENT (OUTPATIENT)
Dept: INFECTIOUS DISEASE | Facility: CLINIC | Age: 53
End: 2023-12-20
Payer: MEDICAID

## 2023-12-20 ENCOUNTER — OUTPATIENT (OUTPATIENT)
Dept: OUTPATIENT SERVICES | Facility: HOSPITAL | Age: 53
LOS: 1 days | End: 2023-12-20
Payer: MEDICAID

## 2023-12-20 VITALS
TEMPERATURE: 97.8 F | WEIGHT: 200 LBS | BODY MASS INDEX: 34.15 KG/M2 | DIASTOLIC BLOOD PRESSURE: 74 MMHG | OXYGEN SATURATION: 96 % | HEIGHT: 64 IN | SYSTOLIC BLOOD PRESSURE: 119 MMHG | HEART RATE: 95 BPM

## 2023-12-20 DIAGNOSIS — L02.213 CUTANEOUS ABSCESS OF CHEST WALL: ICD-10-CM

## 2023-12-20 DIAGNOSIS — A49.8 OTHER BACTERIAL INFECTIONS OF UNSPECIFIED SITE: ICD-10-CM

## 2023-12-20 DIAGNOSIS — Z79.899 OTHER LONG TERM (CURRENT) DRUG THERAPY: ICD-10-CM

## 2023-12-20 DIAGNOSIS — B99.9 UNSPECIFIED INFECTIOUS DISEASE: ICD-10-CM

## 2023-12-20 DIAGNOSIS — B97.89 OTHER VIRAL AGENTS AS THE CAUSE OF DISEASES CLASSIFIED ELSEWHERE: ICD-10-CM

## 2023-12-20 LAB
ALBUMIN SERPL ELPH-MCNC: 4.8 G/DL
ALP BLD-CCNC: 71 U/L
ALT SERPL-CCNC: 41 U/L
ANION GAP SERPL CALC-SCNC: 14 MMOL/L
AST SERPL-CCNC: 26 U/L
BILIRUB SERPL-MCNC: 0.3 MG/DL
BUN SERPL-MCNC: 19 MG/DL
CALCIUM SERPL-MCNC: 9.7 MG/DL
CHLORIDE SERPL-SCNC: 100 MMOL/L
CO2 SERPL-SCNC: 22 MMOL/L
CREAT SERPL-MCNC: 1.27 MG/DL
EGFR: 68 ML/MIN/1.73M2
GLUCOSE SERPL-MCNC: 93 MG/DL
HCT VFR BLD CALC: 45.3 %
HGB BLD-MCNC: 14.8 G/DL
MCHC RBC-ENTMCNC: 28.1 PG
MCHC RBC-ENTMCNC: 32.7 GM/DL
MCV RBC AUTO: 86 FL
PLATELET # BLD AUTO: 292 K/UL
POTASSIUM SERPL-SCNC: 4.2 MMOL/L
PROT SERPL-MCNC: 7.7 G/DL
RBC # BLD: 5.27 M/UL
RBC # FLD: 14.6 %
SODIUM SERPL-SCNC: 136 MMOL/L
WBC # FLD AUTO: 5.55 K/UL

## 2023-12-20 PROCEDURE — 99214 OFFICE O/P EST MOD 30 MIN: CPT

## 2023-12-20 PROCEDURE — G0463: CPT

## 2023-12-20 NOTE — PHYSICAL EXAM
[General Appearance - Alert] : alert [General Appearance - In No Acute Distress] : in no acute distress [Extraocular Movements] : extraocular movements were intact [Hearing Threshold Finger Rub Not [place]] : hearing was normal [Neck Appearance] : the appearance of the neck was normal [Neck Cervical Mass (___cm)] : no neck mass was observed [Exaggerated Use Of Accessory Muscles For Inspiration] : no accessory muscle use [Heart Rate And Rhythm] : heart rate was normal and rhythm regular [Heart Sounds] : normal S1 and S2 [Edema] : there was no peripheral edema [Bowel Sounds] : normal bowel sounds [Abdomen Tenderness] : non-tender [Skin Color & Pigmentation] : normal skin color and pigmentation [] : no rash [Deep Tendon Reflexes (DTR)] : deep tendon reflexes were 2+ and symmetric [Sensation] : the sensory exam was normal to light touch and pinprick [No Focal Deficits] : no focal deficits [Oriented To Time, Place, And Person] : oriented to person, place, and time [Affect] : the affect was normal

## 2023-12-20 NOTE — ASSESSMENT
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 S/p surgical debridement.  He completed 2 weeks of meropenem Continue Bactrim DS 2 tabs po q 12  Plan will be to continue for 6 months through Mid April  Check CBC, CMP weekly for two weeks  Follow up in 6 weeks.

## 2023-12-20 NOTE — HISTORY OF PRESENT ILLNESS
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 S/p surgical debridement.  He completed 2 weeks of meropenem Continue Bactrim DS 2 tabs po q 12  Repeat cbc, cmp monthly  Plan will be to continue for 6 months through Mid April

## 2023-12-21 DIAGNOSIS — B99.9 UNSPECIFIED INFECTIOUS DISEASE: ICD-10-CM

## 2023-12-21 DIAGNOSIS — L02.213 CUTANEOUS ABSCESS OF CHEST WALL: ICD-10-CM

## 2023-12-21 DIAGNOSIS — Z79.899 OTHER LONG TERM (CURRENT) DRUG THERAPY: ICD-10-CM

## 2023-12-21 DIAGNOSIS — A49.8 OTHER BACTERIAL INFECTIONS OF UNSPECIFIED SITE: ICD-10-CM

## 2023-12-27 LAB
ALBUMIN SERPL ELPH-MCNC: 5 G/DL
ALBUMIN SERPL ELPH-MCNC: 5 G/DL
ALP BLD-CCNC: 73 U/L
ALP BLD-CCNC: 76 U/L
ALT SERPL-CCNC: 42 U/L
ALT SERPL-CCNC: 44 U/L
ANION GAP SERPL CALC-SCNC: 12 MMOL/L
ANION GAP SERPL CALC-SCNC: 16 MMOL/L
AST SERPL-CCNC: 27 U/L
AST SERPL-CCNC: 27 U/L
BASOPHILS # BLD AUTO: 0.05 K/UL
BASOPHILS NFR BLD AUTO: 0.7 %
BILIRUB SERPL-MCNC: 0.6 MG/DL
BILIRUB SERPL-MCNC: 0.6 MG/DL
BUN SERPL-MCNC: 19 MG/DL
BUN SERPL-MCNC: 19 MG/DL
CALCIUM SERPL-MCNC: 10.2 MG/DL
CALCIUM SERPL-MCNC: 10.2 MG/DL
CHLORIDE SERPL-SCNC: 101 MMOL/L
CHLORIDE SERPL-SCNC: 102 MMOL/L
CO2 SERPL-SCNC: 20 MMOL/L
CO2 SERPL-SCNC: 23 MMOL/L
CREAT SERPL-MCNC: 1.24 MG/DL
CREAT SERPL-MCNC: 1.28 MG/DL
EGFR: 68 ML/MIN/1.73M2
EGFR: 70 ML/MIN/1.73M2
EOSINOPHIL # BLD AUTO: 0.21 K/UL
EOSINOPHIL NFR BLD AUTO: 2.8 %
ERYTHROCYTE [SEDIMENTATION RATE] IN BLOOD BY WESTERGREN METHOD: 11 MM/HR
GLUCOSE SERPL-MCNC: 85 MG/DL
GLUCOSE SERPL-MCNC: 85 MG/DL
HCT VFR BLD CALC: 49.8 %
HGB BLD-MCNC: 16.5 G/DL
IMM GRANULOCYTES NFR BLD AUTO: 0.3 %
LYMPHOCYTES # BLD AUTO: 2.26 K/UL
LYMPHOCYTES NFR BLD AUTO: 30.5 %
MAN DIFF?: NORMAL
MCHC RBC-ENTMCNC: 27.1 PG
MCHC RBC-ENTMCNC: 33.1 GM/DL
MCV RBC AUTO: 81.8 FL
MONOCYTES # BLD AUTO: 0.51 K/UL
MONOCYTES NFR BLD AUTO: 6.9 %
NEUTROPHILS # BLD AUTO: 4.37 K/UL
NEUTROPHILS NFR BLD AUTO: 58.8 %
PLATELET # BLD AUTO: 343 K/UL
POTASSIUM SERPL-SCNC: 4.3 MMOL/L
POTASSIUM SERPL-SCNC: 4.3 MMOL/L
PROT SERPL-MCNC: 8.1 G/DL
PROT SERPL-MCNC: 8.1 G/DL
RBC # BLD: 6.09 M/UL
RBC # FLD: 14.6 %
SODIUM SERPL-SCNC: 136 MMOL/L
SODIUM SERPL-SCNC: 138 MMOL/L
WBC # FLD AUTO: 7.42 K/UL

## 2024-01-08 NOTE — ED PROVIDER NOTE - CHIEF COMPLAINT
Detail Level: Simple The patient is a 52y Male complaining of  Detail Level: Zone Detail Level: Detailed 61

## 2024-03-20 ENCOUNTER — APPOINTMENT (OUTPATIENT)
Dept: INFECTIOUS DISEASE | Facility: CLINIC | Age: 54
End: 2024-03-20

## 2024-12-25 PROBLEM — F10.90 ALCOHOL USE: Status: ACTIVE | Noted: 2023-09-15

## 2025-05-05 ENCOUNTER — INPATIENT (INPATIENT)
Facility: HOSPITAL | Age: 55
LOS: 7 days | Discharge: HOME HEALTH | DRG: 204 | End: 2025-05-13
Attending: STUDENT IN AN ORGANIZED HEALTH CARE EDUCATION/TRAINING PROGRAM | Admitting: HOSPITALIST
Payer: MEDICAID

## 2025-05-05 VITALS
OXYGEN SATURATION: 95 % | WEIGHT: 192.9 LBS | DIASTOLIC BLOOD PRESSURE: 67 MMHG | HEIGHT: 64 IN | TEMPERATURE: 100 F | SYSTOLIC BLOOD PRESSURE: 101 MMHG | RESPIRATION RATE: 17 BRPM | HEART RATE: 119 BPM

## 2025-05-05 DIAGNOSIS — R06.02 SHORTNESS OF BREATH: ICD-10-CM

## 2025-05-05 LAB
ADD ON TEST-SPECIMEN IN LAB: SIGNIFICANT CHANGE UP
ALBUMIN SERPL ELPH-MCNC: 3.9 G/DL — SIGNIFICANT CHANGE UP (ref 3.3–5)
ALP SERPL-CCNC: 77 U/L — SIGNIFICANT CHANGE UP (ref 40–120)
ALT FLD-CCNC: 36 U/L — SIGNIFICANT CHANGE UP (ref 10–45)
ANION GAP SERPL CALC-SCNC: 16 MMOL/L — SIGNIFICANT CHANGE UP (ref 5–17)
APTT BLD: 29.7 SEC — SIGNIFICANT CHANGE UP (ref 26.1–36.8)
AST SERPL-CCNC: 26 U/L — SIGNIFICANT CHANGE UP (ref 10–40)
BASOPHILS # BLD AUTO: 0 K/UL — SIGNIFICANT CHANGE UP (ref 0–0.2)
BASOPHILS NFR BLD AUTO: 0 % — SIGNIFICANT CHANGE UP (ref 0–2)
BILIRUB SERPL-MCNC: 0.6 MG/DL — SIGNIFICANT CHANGE UP (ref 0.2–1.2)
BUN SERPL-MCNC: 22 MG/DL — SIGNIFICANT CHANGE UP (ref 7–23)
CALCIUM SERPL-MCNC: 9.4 MG/DL — SIGNIFICANT CHANGE UP (ref 8.4–10.5)
CHLORIDE SERPL-SCNC: 99 MMOL/L — SIGNIFICANT CHANGE UP (ref 96–108)
CO2 SERPL-SCNC: 17 MMOL/L — LOW (ref 22–31)
CREAT SERPL-MCNC: 2.12 MG/DL — HIGH (ref 0.5–1.3)
EGFR: 37 ML/MIN/1.73M2 — LOW
EGFR: 37 ML/MIN/1.73M2 — LOW
EOSINOPHIL # BLD AUTO: 0 K/UL — SIGNIFICANT CHANGE UP (ref 0–0.5)
EOSINOPHIL NFR BLD AUTO: 0 % — SIGNIFICANT CHANGE UP (ref 0–6)
FLUAV AG NPH QL: SIGNIFICANT CHANGE UP
FLUBV AG NPH QL: SIGNIFICANT CHANGE UP
GAS PNL BLDV: SIGNIFICANT CHANGE UP
GLUCOSE SERPL-MCNC: 99 MG/DL — SIGNIFICANT CHANGE UP (ref 70–99)
HCT VFR BLD CALC: 45.6 % — SIGNIFICANT CHANGE UP (ref 39–50)
HGB BLD-MCNC: 15.6 G/DL — SIGNIFICANT CHANGE UP (ref 13–17)
INR BLD: 1.21 RATIO — HIGH (ref 0.85–1.16)
LYMPHOCYTES # BLD AUTO: 1.73 K/UL — SIGNIFICANT CHANGE UP (ref 1–3.3)
LYMPHOCYTES # BLD AUTO: 13.9 % — SIGNIFICANT CHANGE UP (ref 13–44)
MANUAL SMEAR VERIFICATION: SIGNIFICANT CHANGE UP
MCHC RBC-ENTMCNC: 28 PG — SIGNIFICANT CHANGE UP (ref 27–34)
MCHC RBC-ENTMCNC: 34.2 G/DL — SIGNIFICANT CHANGE UP (ref 32–36)
MCV RBC AUTO: 81.9 FL — SIGNIFICANT CHANGE UP (ref 80–100)
MONOCYTES # BLD AUTO: 1.63 K/UL — HIGH (ref 0–0.9)
MONOCYTES NFR BLD AUTO: 13.1 % — SIGNIFICANT CHANGE UP (ref 2–14)
NEUTROPHILS # BLD AUTO: 9.07 K/UL — HIGH (ref 1.8–7.4)
NEUTROPHILS NFR BLD AUTO: 73 % — SIGNIFICANT CHANGE UP (ref 43–77)
PLAT MORPH BLD: NORMAL — SIGNIFICANT CHANGE UP
PLATELET # BLD AUTO: 302 K/UL — SIGNIFICANT CHANGE UP (ref 150–400)
POTASSIUM SERPL-MCNC: 3.8 MMOL/L — SIGNIFICANT CHANGE UP (ref 3.5–5.3)
POTASSIUM SERPL-SCNC: 3.8 MMOL/L — SIGNIFICANT CHANGE UP (ref 3.5–5.3)
PROT SERPL-MCNC: 8.6 G/DL — HIGH (ref 6–8.3)
PROTHROM AB SERPL-ACNC: 13.9 SEC — HIGH (ref 9.9–13.4)
RBC # BLD: 5.57 M/UL — SIGNIFICANT CHANGE UP (ref 4.2–5.8)
RBC # FLD: 13.2 % — SIGNIFICANT CHANGE UP (ref 10.3–14.5)
RBC BLD AUTO: SIGNIFICANT CHANGE UP
RSV RNA NPH QL NAA+NON-PROBE: SIGNIFICANT CHANGE UP
SARS-COV-2 RNA SPEC QL NAA+PROBE: SIGNIFICANT CHANGE UP
SODIUM SERPL-SCNC: 132 MMOL/L — LOW (ref 135–145)
SOURCE RESPIRATORY: SIGNIFICANT CHANGE UP
TROPONIN T, HIGH SENSITIVITY RESULT: 7 NG/L — SIGNIFICANT CHANGE UP (ref 0–51)
TROPONIN T, HIGH SENSITIVITY RESULT: 8 NG/L — SIGNIFICANT CHANGE UP (ref 0–51)
WBC # BLD: 12.42 K/UL — HIGH (ref 3.8–10.5)
WBC # FLD AUTO: 12.42 K/UL — HIGH (ref 3.8–10.5)

## 2025-05-05 PROCEDURE — 71275 CT ANGIOGRAPHY CHEST: CPT | Mod: 26

## 2025-05-05 PROCEDURE — 93010 ELECTROCARDIOGRAM REPORT: CPT

## 2025-05-05 PROCEDURE — 71046 X-RAY EXAM CHEST 2 VIEWS: CPT | Mod: 26

## 2025-05-05 PROCEDURE — 99285 EMERGENCY DEPT VISIT HI MDM: CPT

## 2025-05-05 RX ORDER — SODIUM CHLORIDE 9 G/1000ML
1000 INJECTION, SOLUTION INTRAVENOUS ONCE
Refills: 0 | Status: COMPLETED | OUTPATIENT
Start: 2025-05-05 | End: 2025-05-05

## 2025-05-05 RX ORDER — DOXYCYCLINE HYCLATE 100 MG
100 TABLET ORAL EVERY 12 HOURS
Refills: 0 | Status: COMPLETED | OUTPATIENT
Start: 2025-05-05 | End: 2025-05-10

## 2025-05-05 RX ORDER — MEROPENEM 1 G/30ML
1000 INJECTION INTRAVENOUS ONCE
Refills: 0 | Status: COMPLETED | OUTPATIENT
Start: 2025-05-05 | End: 2025-05-05

## 2025-05-05 RX ADMIN — MEROPENEM 100 MILLIGRAM(S): 1 INJECTION INTRAVENOUS at 21:10

## 2025-05-05 RX ADMIN — Medication 100 MILLIGRAM(S): at 23:19

## 2025-05-05 RX ADMIN — SODIUM CHLORIDE 1000 MILLILITER(S): 9 INJECTION, SOLUTION INTRAVENOUS at 18:53

## 2025-05-05 NOTE — ED ADULT NURSE NOTE - CHIEF COMPLAINT QUOTE
pt c/o chest pain, palpitations, sob, fatigue since 4/30   pt c/o skin wound on hands and feet since 4/27    sent by MD to r/o sepsis  h/o melioidosis

## 2025-05-05 NOTE — ED PROVIDER NOTE - PROGRESS NOTE DETAILS
Sarthak Feliciano MD, PGY1: Pt received at [facility]. Pt states sxs improved. Consulted infectious disease who rec started meropenum and doxycycline. Will admit for further workup.

## 2025-05-05 NOTE — ED ADULT NURSE REASSESSMENT NOTE - NS ED NURSE REASSESS COMMENT FT1
Report received from MARY LOU Ochoa. Pt is observed sitting up in stretcher conversing with RN at this time. Pt breathing spontaneous and unlabored. Pt updated on plan of care and awaiting dispo at this time. Safety and comfort measures maintained. Call bell within reach. VSS. PT states his chest pain started 4 days ago in [facility], was seen in [facility] and given antibiotics and NSAIDS which helped but states his fevers persisted every few hours. Pt denies chest pain radiation or numbness and tingling, maintained on cardiac monitor, NSR.

## 2025-05-05 NOTE — ED ADULT NURSE REASSESSMENT NOTE - NS ED NURSE REASSESS COMMENT FT1
1845 Type sent as ordered ans pt has CT and x-ray Pending placement of PICC line Pt voided urine sent as ordered MPRN

## 2025-05-05 NOTE — ED ADULT NURSE REASSESSMENT NOTE - NS ED NURSE REASSESS COMMENT FT1
Pt observed sitting up in stretcher conversing with RN without difficulty. Breathing spontaneous and unlabored. Pt updated on plan of care awaiting bed assignment, denies chest pain at this time. No acute distress noted. Call bell within reach. VSS, Cardiac monitor NSR.

## 2025-05-05 NOTE — ED ADULT TRIAGE NOTE - CHIEF COMPLAINT QUOTE
pt c/o chest pain, palpitations, sob since 4/30   pt c/o skin wound on hands and feet since 4/27    h/o melioidosis pt c/o chest pain, palpitations, sob, fatigue since 4/30   pt c/o skin wound on hands and feet since 4/27    sent by MD to r/o sepsis  h/o melioidosis

## 2025-05-05 NOTE — ED ADULT NURSE NOTE - NSFALLUNIVINTERV_ED_ALL_ED
Bed/Stretcher in lowest position, wheels locked, appropriate side rails in place/Call bell, personal items and telephone in reach/Instruct patient to call for assistance before getting out of bed/chair/stretcher/Non-slip footwear applied when patient is off stretcher/[place] to call system/Physically safe environment - no spills, clutter or unnecessary equipment/Purposeful proactive rounding/Room/bathroom lighting operational, light cord in reach

## 2025-05-05 NOTE — ED PROVIDER NOTE - CLINICAL SUMMARY MEDICAL DECISION MAKING FREE TEXT BOX
54-year-old male with history of hypertension, hyperlipidemia, sternal abscess status post I&D and VAC 2023 secondary to melioidosis presenting for chest pain shortness of breath.  Patient reports he returned from [place] 4 days ago and is having shortness of breath worse on exertion and intermittent left-sided chest pain worse on exertion and nonradiating.  Endorses intermittent night sweats and subjective fevers at home over the last week.  Report he was started on unknown antibiotic while he was in [place] for his symptoms of weakness, fevers.  Patient was treated for melioidosis with meropenem 2023 denies any complications since.  Additionally over the past week or 2 has noticed new small skin lesions on the feet and has been receiving pedicures.   On exam patient arrived tachycardic to the 120s which improved to 100-105 patient is slight tachypneic lungs clear to auscultation.  Will perform infectious, cardiac, pulmonary emboli workup.

## 2025-05-06 DIAGNOSIS — R00.2 PALPITATIONS: ICD-10-CM

## 2025-05-06 DIAGNOSIS — N17.9 ACUTE KIDNEY FAILURE, UNSPECIFIED: ICD-10-CM

## 2025-05-06 DIAGNOSIS — A41.9 SEPSIS, UNSPECIFIED ORGANISM: ICD-10-CM

## 2025-05-06 DIAGNOSIS — L98.9 DISORDER OF THE SKIN AND SUBCUTANEOUS TISSUE, UNSPECIFIED: ICD-10-CM

## 2025-05-06 DIAGNOSIS — E78.5 HYPERLIPIDEMIA, UNSPECIFIED: ICD-10-CM

## 2025-05-06 DIAGNOSIS — R06.00 DYSPNEA, UNSPECIFIED: ICD-10-CM

## 2025-05-06 DIAGNOSIS — Z29.9 ENCOUNTER FOR PROPHYLACTIC MEASURES, UNSPECIFIED: ICD-10-CM

## 2025-05-06 DIAGNOSIS — E87.1 HYPO-OSMOLALITY AND HYPONATREMIA: ICD-10-CM

## 2025-05-06 DIAGNOSIS — I10 ESSENTIAL (PRIMARY) HYPERTENSION: ICD-10-CM

## 2025-05-06 DIAGNOSIS — Z91.89 OTHER SPECIFIED PERSONAL RISK FACTORS, NOT ELSEWHERE CLASSIFIED: ICD-10-CM

## 2025-05-06 DIAGNOSIS — A24.9: ICD-10-CM

## 2025-05-06 DIAGNOSIS — D72.829 ELEVATED WHITE BLOOD CELL COUNT, UNSPECIFIED: ICD-10-CM

## 2025-05-06 LAB
A1C WITH ESTIMATED AVERAGE GLUCOSE RESULT: 5.6 % — SIGNIFICANT CHANGE UP (ref 4–5.6)
ALBUMIN SERPL ELPH-MCNC: 3.8 G/DL — SIGNIFICANT CHANGE UP (ref 3.3–5)
ALP SERPL-CCNC: 76 U/L — SIGNIFICANT CHANGE UP (ref 40–120)
ALT FLD-CCNC: 37 U/L — SIGNIFICANT CHANGE UP (ref 10–45)
ANION GAP SERPL CALC-SCNC: 14 MMOL/L — SIGNIFICANT CHANGE UP (ref 5–17)
ANION GAP SERPL CALC-SCNC: 17 MMOL/L — SIGNIFICANT CHANGE UP (ref 5–17)
APPEARANCE UR: CLEAR — SIGNIFICANT CHANGE UP
AST SERPL-CCNC: 27 U/L — SIGNIFICANT CHANGE UP (ref 10–40)
BACTERIA # UR AUTO: NEGATIVE /HPF — SIGNIFICANT CHANGE UP
BASOPHILS # BLD AUTO: 0.06 K/UL — SIGNIFICANT CHANGE UP (ref 0–0.2)
BASOPHILS NFR BLD AUTO: 0.5 % — SIGNIFICANT CHANGE UP (ref 0–2)
BILIRUB SERPL-MCNC: 0.7 MG/DL — SIGNIFICANT CHANGE UP (ref 0.2–1.2)
BILIRUB UR-MCNC: NEGATIVE — SIGNIFICANT CHANGE UP
BUN SERPL-MCNC: 18 MG/DL — SIGNIFICANT CHANGE UP (ref 7–23)
BUN SERPL-MCNC: 19 MG/DL — SIGNIFICANT CHANGE UP (ref 7–23)
CALCIUM SERPL-MCNC: 8.9 MG/DL — SIGNIFICANT CHANGE UP (ref 8.4–10.5)
CALCIUM SERPL-MCNC: 9.3 MG/DL — SIGNIFICANT CHANGE UP (ref 8.4–10.5)
CAST: 1 /LPF — SIGNIFICANT CHANGE UP (ref 0–4)
CHLORIDE SERPL-SCNC: 101 MMOL/L — SIGNIFICANT CHANGE UP (ref 96–108)
CHLORIDE SERPL-SCNC: 99 MMOL/L — SIGNIFICANT CHANGE UP (ref 96–108)
CHOLEST SERPL-MCNC: 229 MG/DL — HIGH
CO2 SERPL-SCNC: 15 MMOL/L — LOW (ref 22–31)
CO2 SERPL-SCNC: 17 MMOL/L — LOW (ref 22–31)
COLOR SPEC: YELLOW — SIGNIFICANT CHANGE UP
CREAT ?TM UR-MCNC: 208 MG/DL — SIGNIFICANT CHANGE UP
CREAT SERPL-MCNC: 1.27 MG/DL — SIGNIFICANT CHANGE UP (ref 0.5–1.3)
CREAT SERPL-MCNC: 1.32 MG/DL — HIGH (ref 0.5–1.3)
DIFF PNL FLD: NEGATIVE — SIGNIFICANT CHANGE UP
EGFR: 65 ML/MIN/1.73M2 — SIGNIFICANT CHANGE UP
EGFR: 65 ML/MIN/1.73M2 — SIGNIFICANT CHANGE UP
EGFR: 68 ML/MIN/1.73M2 — SIGNIFICANT CHANGE UP
EGFR: 68 ML/MIN/1.73M2 — SIGNIFICANT CHANGE UP
EOSINOPHIL # BLD AUTO: 0.01 K/UL — SIGNIFICANT CHANGE UP (ref 0–0.5)
EOSINOPHIL NFR BLD AUTO: 0.1 % — SIGNIFICANT CHANGE UP (ref 0–6)
ESTIMATED AVERAGE GLUCOSE: 114 MG/DL — SIGNIFICANT CHANGE UP (ref 68–114)
GLUCOSE SERPL-MCNC: 104 MG/DL — HIGH (ref 70–99)
GLUCOSE SERPL-MCNC: 90 MG/DL — SIGNIFICANT CHANGE UP (ref 70–99)
GLUCOSE UR QL: NEGATIVE MG/DL — SIGNIFICANT CHANGE UP
HCT VFR BLD CALC: 45 % — SIGNIFICANT CHANGE UP (ref 39–50)
HDLC SERPL-MCNC: 37 MG/DL — LOW
HGB BLD-MCNC: 15.3 G/DL — SIGNIFICANT CHANGE UP (ref 13–17)
IMM GRANULOCYTES NFR BLD AUTO: 0.6 % — SIGNIFICANT CHANGE UP (ref 0–0.9)
KETONES UR-MCNC: ABNORMAL MG/DL
LDLC SERPL-MCNC: 171 MG/DL — HIGH
LEUKOCYTE ESTERASE UR-ACNC: NEGATIVE — SIGNIFICANT CHANGE UP
LIPID PNL WITH DIRECT LDL SERPL: 171 MG/DL — HIGH
LYMPHOCYTES # BLD AUTO: 19.1 % — SIGNIFICANT CHANGE UP (ref 13–44)
LYMPHOCYTES # BLD AUTO: 2.12 K/UL — SIGNIFICANT CHANGE UP (ref 1–3.3)
MAGNESIUM SERPL-MCNC: 2.4 MG/DL — SIGNIFICANT CHANGE UP (ref 1.6–2.6)
MCHC RBC-ENTMCNC: 28 PG — SIGNIFICANT CHANGE UP (ref 27–34)
MCHC RBC-ENTMCNC: 34 G/DL — SIGNIFICANT CHANGE UP (ref 32–36)
MCV RBC AUTO: 82.3 FL — SIGNIFICANT CHANGE UP (ref 80–100)
MONOCYTES # BLD AUTO: 1.22 K/UL — HIGH (ref 0–0.9)
MONOCYTES NFR BLD AUTO: 11 % — SIGNIFICANT CHANGE UP (ref 2–14)
NEUTROPHILS # BLD AUTO: 7.62 K/UL — HIGH (ref 1.8–7.4)
NEUTROPHILS NFR BLD AUTO: 68.7 % — SIGNIFICANT CHANGE UP (ref 43–77)
NITRITE UR-MCNC: NEGATIVE — SIGNIFICANT CHANGE UP
NONHDLC SERPL-MCNC: 192 MG/DL — HIGH
NRBC BLD AUTO-RTO: 0 /100 WBCS — SIGNIFICANT CHANGE UP (ref 0–0)
OSMOLALITY UR: 669 MOS/KG — SIGNIFICANT CHANGE UP (ref 300–900)
PH UR: 5 — SIGNIFICANT CHANGE UP (ref 5–8)
PHOSPHATE SERPL-MCNC: 2.9 MG/DL — SIGNIFICANT CHANGE UP (ref 2.5–4.5)
PLATELET # BLD AUTO: 314 K/UL — SIGNIFICANT CHANGE UP (ref 150–400)
POTASSIUM SERPL-MCNC: 3.6 MMOL/L — SIGNIFICANT CHANGE UP (ref 3.5–5.3)
POTASSIUM SERPL-MCNC: 3.8 MMOL/L — SIGNIFICANT CHANGE UP (ref 3.5–5.3)
POTASSIUM SERPL-SCNC: 3.6 MMOL/L — SIGNIFICANT CHANGE UP (ref 3.5–5.3)
POTASSIUM SERPL-SCNC: 3.8 MMOL/L — SIGNIFICANT CHANGE UP (ref 3.5–5.3)
PROT ?TM UR-MCNC: 37 MG/DL — HIGH (ref 0–12)
PROT SERPL-MCNC: 8.6 G/DL — HIGH (ref 6–8.3)
PROT UR-MCNC: 30 MG/DL
PROT/CREAT UR-RTO: 0.2 RATIO — SIGNIFICANT CHANGE UP (ref 0–0.2)
RBC # BLD: 5.47 M/UL — SIGNIFICANT CHANGE UP (ref 4.2–5.8)
RBC # FLD: 13.3 % — SIGNIFICANT CHANGE UP (ref 10.3–14.5)
RBC CASTS # UR COMP ASSIST: 0 /HPF — SIGNIFICANT CHANGE UP (ref 0–4)
SODIUM SERPL-SCNC: 131 MMOL/L — LOW (ref 135–145)
SODIUM SERPL-SCNC: 132 MMOL/L — LOW (ref 135–145)
SODIUM UR-SCNC: 73 MMOL/L — SIGNIFICANT CHANGE UP
SP GR SPEC: >1.03 — HIGH (ref 1–1.03)
SQUAMOUS # UR AUTO: 1 /HPF — SIGNIFICANT CHANGE UP (ref 0–5)
TRIGL SERPL-MCNC: 114 MG/DL — SIGNIFICANT CHANGE UP
UROBILINOGEN FLD QL: 0.2 MG/DL — SIGNIFICANT CHANGE UP (ref 0.2–1)
UUN UR-MCNC: 994 MG/DL — SIGNIFICANT CHANGE UP
WBC # BLD: 11.1 K/UL — HIGH (ref 3.8–10.5)
WBC # FLD AUTO: 11.1 K/UL — HIGH (ref 3.8–10.5)
WBC UR QL: 1 /HPF — SIGNIFICANT CHANGE UP (ref 0–5)

## 2025-05-06 PROCEDURE — 99223 1ST HOSP IP/OBS HIGH 75: CPT

## 2025-05-06 PROCEDURE — 76770 US EXAM ABDO BACK WALL COMP: CPT | Mod: 26

## 2025-05-06 PROCEDURE — 99223 1ST HOSP IP/OBS HIGH 75: CPT | Mod: GC

## 2025-05-06 RX ORDER — MEROPENEM 1 G/30ML
1000 INJECTION INTRAVENOUS EVERY 8 HOURS
Refills: 0 | Status: DISCONTINUED | OUTPATIENT
Start: 2025-05-06 | End: 2025-05-07

## 2025-05-06 RX ORDER — LORAZEPAM 4 MG/ML
4 VIAL (ML) INJECTION
Refills: 0 | Status: DISCONTINUED | OUTPATIENT
Start: 2025-05-06 | End: 2025-05-06

## 2025-05-06 RX ORDER — HEPARIN SODIUM 1000 [USP'U]/ML
5000 INJECTION INTRAVENOUS; SUBCUTANEOUS EVERY 8 HOURS
Refills: 0 | Status: DISCONTINUED | OUTPATIENT
Start: 2025-05-06 | End: 2025-05-07

## 2025-05-06 RX ORDER — ATORVASTATIN CALCIUM 80 MG/1
20 TABLET, FILM COATED ORAL AT BEDTIME
Refills: 0 | Status: DISCONTINUED | OUTPATIENT
Start: 2025-05-06 | End: 2025-05-07

## 2025-05-06 RX ORDER — B1/B2/B3/B5/B6/B12/VIT C/FOLIC 500-0.5 MG
1 TABLET ORAL DAILY
Refills: 0 | Status: DISCONTINUED | OUTPATIENT
Start: 2025-05-06 | End: 2025-05-13

## 2025-05-06 RX ORDER — ACETAMINOPHEN 500 MG/5ML
650 LIQUID (ML) ORAL EVERY 6 HOURS
Refills: 0 | Status: DISCONTINUED | OUTPATIENT
Start: 2025-05-06 | End: 2025-05-13

## 2025-05-06 RX ORDER — LORAZEPAM 4 MG/ML
2 VIAL (ML) INJECTION
Refills: 0 | Status: DISCONTINUED | OUTPATIENT
Start: 2025-05-06 | End: 2025-05-06

## 2025-05-06 RX ORDER — FOLIC ACID 1 MG/1
1 TABLET ORAL DAILY
Refills: 0 | Status: DISCONTINUED | OUTPATIENT
Start: 2025-05-06 | End: 2025-05-13

## 2025-05-06 RX ORDER — ONDANSETRON HCL/PF 4 MG/2 ML
4 VIAL (ML) INJECTION EVERY 8 HOURS
Refills: 0 | Status: DISCONTINUED | OUTPATIENT
Start: 2025-05-06 | End: 2025-05-13

## 2025-05-06 RX ORDER — MELATONIN 5 MG
3 TABLET ORAL AT BEDTIME
Refills: 0 | Status: DISCONTINUED | OUTPATIENT
Start: 2025-05-06 | End: 2025-05-13

## 2025-05-06 RX ADMIN — FOLIC ACID 1 MILLIGRAM(S): 1 TABLET ORAL at 11:30

## 2025-05-06 RX ADMIN — MEROPENEM 100 MILLIGRAM(S): 1 INJECTION INTRAVENOUS at 06:05

## 2025-05-06 RX ADMIN — MEROPENEM 100 MILLIGRAM(S): 1 INJECTION INTRAVENOUS at 14:30

## 2025-05-06 RX ADMIN — Medication 650 MILLIGRAM(S): at 21:08

## 2025-05-06 RX ADMIN — Medication 100 MILLIGRAM(S): at 23:31

## 2025-05-06 RX ADMIN — MEROPENEM 100 MILLIGRAM(S): 1 INJECTION INTRAVENOUS at 21:07

## 2025-05-06 RX ADMIN — HEPARIN SODIUM 5000 UNIT(S): 1000 INJECTION INTRAVENOUS; SUBCUTANEOUS at 06:05

## 2025-05-06 RX ADMIN — Medication 100 MILLIGRAM(S): at 11:30

## 2025-05-06 RX ADMIN — Medication 1 TABLET(S): at 11:30

## 2025-05-06 RX ADMIN — Medication 1000 MILLILITER(S): at 12:35

## 2025-05-06 RX ADMIN — Medication 40 MILLIEQUIVALENT(S): at 16:26

## 2025-05-06 RX ADMIN — ATORVASTATIN CALCIUM 20 MILLIGRAM(S): 80 TABLET, FILM COATED ORAL at 21:08

## 2025-05-06 NOTE — PROGRESS NOTE ADULT - PROBLEM SELECTOR PLAN 4
No hx alcohol withdrawal. Arrived with palpitations, anxiety. No fasciculations or asterixis.     -D/c JENAWA

## 2025-05-06 NOTE — PROGRESS NOTE ADULT - PROBLEM SELECTOR PLAN 6
Likely related to sepsis. No PE on CTA chest  EKG sinus tachycardia, non ischemic  Troponins WNL  - differential also includes alcohol withdrawal
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
## 2025-05-06 NOTE — H&P ADULT - NSHPLABSRESULTS_GEN_ALL_CORE
LABS:                        15.6   12.42 )-----------( 302      ( 05 May 2025 18:52 )             45.6     05-05    132[L]  |  99  |  22  ----------------------------<  99  3.8   |  17[L]  |  2.12[H]    Ca    9.4      05 May 2025 18:52    TPro  8.6[H]  /  Alb  3.9  /  TBili  0.6  /  DBili  x   /  AST  26  /  ALT  36  /  AlkPhos  77  05-05        PT/INR - ( 05 May 2025 18:52 )   PT: 13.9 sec;   INR: 1.21 ratio         PTT - ( 05 May 2025 18:52 )  PTT:29.7 sec  Urinalysis Basic - ( 05 May 2025 18:52 )    Color: x / Appearance: x / SG: x / pH: x  Gluc: 99 mg/dL / Ketone: x  / Bili: x / Urobili: x   Blood: x / Protein: x / Nitrite: x   Leuk Esterase: x / RBC: x / WBC x   Sq Epi: x / Non Sq Epi: x / Bacteria: x      I&O's Summary    BNP    RADIOLOGY & ADDITIONAL STUDIES:    EKG: reviewed - sinus tach 112, nonischemic

## 2025-05-06 NOTE — SBIRT NOTE ADULT - NSSBIRTUNABLESCROTHER_GEN_A_CORE
Patient shared his alcohol use is not an issue for him and that he drinks socially. Patient declined resources as well. SW remains available.

## 2025-05-06 NOTE — PATIENT PROFILE ADULT - FUNCTIONAL ASSESSMENT - DAILY ACTIVITY 6.
Group Topic:  Group Psychotherapy    Date: 4/11/2025  Start Time: 10:15 AM  End Time: 11:15 AM  Facilitators: Eloy Rosas LCSW    Focus:  The focus of todays group was on peoples challenges with fear and how to address it as evidenced by facing ones fears and walking through them.   Number in attendance: 7    Pt was an active participant in group and shared about their challenges in facing their fears. They were attentive to a group discussion about feeling frustrated and scared of the future. Therapist encouraged the patient to focus on areas of their life where they have some healthy power and control.  Therapist and group offered the patient support and feedback   Pt. was engaged and responsive and supportive of others     Method: Group  Attendance: Present  Participation: Active  Patient Response: Appropriate feedback and Attentive  Mood: Anxious and Depressed  Affect: Type: Anxious and Depressed   Range: Blunted/flat   Congruency: Congruent   Stability: Stable  Behavior/Socialization: Cooperative  Thought Process: Focused  Task Performance: Follows directions  Additional Information:  Psychosocial Stressors: Health  Symptom Notations: anxious, depressed, engaged.   Patient Evaluation: Encouragement - needs prompts     4 = No assist / stand by assistance

## 2025-05-06 NOTE — PROGRESS NOTE ADULT - PROBLEM SELECTOR PLAN 2
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 Recent travel exposure.     -Pending ID consult   -C/w abx as above  -F/u urine cx, and blood cx 5/6

## 2025-05-06 NOTE — PROGRESS NOTE ADULT - PROBLEM SELECTOR PLAN 5
Baseline SCr 0.9-1.0. SCr 2.12 on admission, currently downtrending to 1.32. DDx: prerenal vs ATN vs ibuprofen. Urine osmolality 669 and Na 73.     -Avoid nephrotoxics   -Monitor BUN/Cr   -Encourage po intake

## 2025-05-06 NOTE — H&P ADULT - HISTORY OF PRESENT ILLNESS
54M pmhx HTN, HLD, sternal abcess s/p I&D and wound vac found to have meliodosis on wound culture s/p meropenem and Bactrim eradication therapy presented w/ chest pain and SOB. Also has night sweats, fevers, and foot lesions. Recent travel to [facility], returned 4 days ago    ED course: ID consult --> doxy 100 x1, meropenem 1000mg x1. 1L LR 54M pmhx HTN, HLD, sternal abcess s/p I&D and wound vac found to have meliodosis on wound culture s/p meropenem and Bactrim eradication therapy presented w/ palpitations and SOB x4 days. He initially noticed generalized fatigue about 1 week ago, then he started noticing hand and foot dark lesions about 4 days ago. He tried to have the foot lesions treated at a pedicure. Patient was in [facility] x3 months and returned 4 days ago. Prior to returning to the , he saw a doctor there who prescribed an antibiotic and an "antiinflammatory" medication. He then developed palpitations and SOB, which was particularly worsened over the past few days. Over this time, he has also been having night sweats and fevers. He has been taking Tylenol and ibuprofen. He was then sent in by his PMD given his concern for meliodosis    He also reports daily alcohol use consisting of 3 "tall" beers per day, which he stopped about 3-4 days ago. Denies history of alcohol withdrawal or ICU for alcohol withdrawal. Denies current AV hallucinations, tremors.    ED course: ID consult --> doxy 100 x1, meropenem 1000mg x1. 1L LR

## 2025-05-06 NOTE — H&P ADULT - ASSESSMENT
54M pmhx HTN, HLD, sternal abcess s/p I&D and wound vac found to have meliodosis on wound culture s/p meropenem and Bactrim eradication therapy presented w/ palpitations and SOB x4 days

## 2025-05-06 NOTE — H&P ADULT - PROBLEM SELECTOR PLAN 3
L hand - 2-3cm dark/black lesion on L 4th finer around PIP joint w/ uneven borders but no fluctuance; R hand - similar lesion but smaller on medial aspect of hand. L foot - 3 lesions w/ similar appearance, about 0.5-1cm per lesion on sole of foot  No obvious lesions to culture  - suspect 2/2 melioidosis  - doubt embolic phenomenon given it is not at most distal portion, but can check TTE anyways L hand - 2-3cm dark/black lesion on L 4th finer around PIP joint w/ uneven borders but no fluctuance; R hand - similar lesion but smaller on medial aspect of hand. L foot - 3 lesions w/ similar appearance, about 0.5-1cm per lesion on sole of foot  No obvious lesions to culture  - suspect 2/2 melioidosis  - doubt embolic phenomenon given it is not at most distal portion, so will hold off on checking TTE unless BCx suggestive of endocarditis

## 2025-05-06 NOTE — H&P ADULT - NSHPPHYSICALEXAM_GEN_ALL_CORE
T(C): 36.8 (05-05-25 @ 23:35), Max: 37.6 (05-05-25 @ 17:04)  HR: 94 (05-05-25 @ 23:35) (84 - 119)  BP: 103/62 (05-05-25 @ 23:35) (101/67 - 140/69)  RR: 18 (05-05-25 @ 23:35) (17 - 18)  SpO2: 98% (05-05-25 @ 23:35) (95% - 99%)    GENERAL: NAD, nontoxic  EYES:  no scleral icterus  NECK: No JVD  LUNG: CTAB, no wheezes, no rhonchi, no accessory muscle use  HEART: RRR, no m/g/r  ABDOMEN: Soft, NT, ND  EXTREMITIES:  No BLE edema  NEUROLOGY: A&Ox4, grossly nonfocal  SKIN: L hand - 2-3cm dark/black lesion on L 4th finer around PIP joint w/ uneven borders but no fluctuance; R hand - similar lesion but smaller on medial aspect of hand. L foot - 3 lesions w/ similar appearance, about 0.5-1cm per lesion on sole of foot

## 2025-05-06 NOTE — H&P ADULT - NSHPREVIEWOFSYSTEMS_GEN_ALL_CORE
REVIEW OF SYSTEMS:    CONSTITUTIONAL: as per HPI  EYES/ENT: No visual changes;  No vertigo or throat pain   NECK: No pain or stiffness  RESPIRATORY: as per HPI  CARDIOVASCULAR: as per HPI  GASTROINTESTINAL: No abdominal or epigastric pain. No nausea, vomiting, or hematemesis; No diarrhea or constipation. No melena or hematochezia.  GENITOURINARY: No dysuria, frequency or hematuria  NEUROLOGICAL: No numbness or weakness  SKIN: No itching, burning, rashes, or lesions   All other review of systems is negative unless indicated above.

## 2025-05-06 NOTE — H&P ADULT - NSICDXPASTMEDICALHX_GEN_ALL_CORE_FT
PAST MEDICAL HISTORY:  2019 novel coronavirus disease (COVID-19)     HTN (hypertension)     Hyperlipidemia     Melioidosis     Tuberculosis

## 2025-05-06 NOTE — H&P ADULT - PROBLEM SELECTOR PLAN 4
Reports daily alcohol use, 3 "tall" beers per day. No history of alcohol withdrawal or ICU stays. Last drink 5/1  - CIWA w/ symptom triggered Ativan  - if no withdrawal symptoms, would have low threshold to DC CIWA set

## 2025-05-06 NOTE — PROGRESS NOTE ADULT - PROBLEM SELECTOR PLAN 1
Reported fever at home, however no fever in hospital; arrived with leukocytosis WBC 12 and tachycardia. Recent travel exposure and development of nonspecific symptoms and dark lesions on fingers/hands. DDx: meliodosis     -Pending ID consult   -F/u blood cx   -C/w doxycycline 100 mg IV q12 (started 5/6)   -C/w meropenem 1000 mg IV q8 (started 5/6)

## 2025-05-06 NOTE — H&P ADULT - ATTENDING COMMENTS
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 Also noted to have wounds that developed on hands and feet. On exam, most notably there is a lesion on the fourth digit on left hand on palmar surface over the PIP joint. Lesion is hyperpigmented, eschar-like with crusting. Similar lesions on R hand and B/L feet, though lesions appear much smaller. Lesions do not appear acutely infectious. No drainage or bleeding. Uncertain if related to previous infection.     Will continue with empiric antibiotics in meantime. Continue meropenem and doxycycline. F/u ID consult in AM. F/u blood cultures collected. No open wounds or lesions suitable for culture. Will monitor on CIWA for now given reported alcohol use. Rest of plan as above.    Discussed case with resident Dr. Valentine

## 2025-05-06 NOTE — PATIENT PROFILE ADULT - FALL HARM RISK - HARM RISK INTERVENTIONS

## 2025-05-06 NOTE — H&P ADULT - PROBLEM SELECTOR PLAN 6
Likely related to sepsis. No PE on CTA chest  EKG sinus tachycardia, non ischemic  Troponins WNL  - differential also includes alcohol withdrawal  - can check TTE Likely related to sepsis. No PE on CTA chest  EKG sinus tachycardia, non ischemic  Troponins WNL  - differential also includes alcohol withdrawal

## 2025-05-06 NOTE — PROGRESS NOTE ADULT - SUBJECTIVE AND OBJECTIVE BOX
INPATIENT MEDICINE PROGRESS NOTE:   Authored by Jasmin Junior MD     HISTORY OF PRESENT ILLNESS:    NAEON. Afebrile, HR 80s-100. SBPs 100s-120s. RA.     Seen and examined at bedside, patient reports feeling better and unsure what could have brought that on. Reports not having the palpitations, CP, night sweats anymore. Reports having had chemical peel done in feet while in [place] and recently had splinter in L hand where lesion was but not on the R sided lesion. Has not been sexually active with wife 6-7 years. Reports having had mosquito and bug exposure in [place]. Denies nature exposure, animal bites. Denies hx of alcohol withdrawl but did not know what symptoms were for that.       PHYSICAL EXAM:  Vital Signs Last 24 Hrs  T(C): 37.8 (06 May 2025 08:06), Max: 37.8 (06 May 2025 08:06)  T(F): 100 (06 May 2025 08:06), Max: 100 (06 May 2025 08:06)  HR: 100 (06 May 2025 08:06) (84 - 119)  BP: 106/71 (06 May 2025 08:06) (101/67 - 140/69)  BP(mean): --  RR: 18 (06 May 2025 08:06) (17 - 18)  SpO2: 96% (06 May 2025 08:06) (94% - 99%)    Parameters below as of 06 May 2025 08:06  Patient On (Oxygen Delivery Method): room air        General: NAD, calm, cooperative, overweight habitus   Neuro: AAOx3, 5/5  strength b/l, 5/5 hip flexion/extension, spontaneity, no tongue fascitulations, no asterixis   HEENT: NC/AT, PERRLA b/l, EOMI, nonconjunctival pallor,   Chest: old scar in upper sternum with divet 0.5 cm from old I&D   Heart: S1/S2, RRR   Lungs: CTA b/l, nonlabored breathing   Abd: soft, nonTTP, nondistended   Ext: 1 cm firm dark blood collection lateral aspect of L 4th finger and 3rd middle digit of R hand, scatterd 3-4 simialr lesions on lateral sole aspect of feet b/l   Back: nonTTP   Psych: full range affect, hyperverbal, mutual topic, linear and goal directed thought process     I&O's Summary      LABS:                        15.3   11.10 )-----------( 314      ( 06 May 2025 07:17 )             45.0     05-06    131[L]  |  99  |  19  ----------------------------<  90  3.8   |  15[L]  |  1.32[H]    Ca    9.3      06 May 2025 07:15  Phos  2.9     05-06  Mg     2.4     05-06    TPro  8.6[H]  /  Alb  3.8  /  TBili  0.7  /  DBili  x   /  AST  27  /  ALT  37  /  AlkPhos  76  05-06    CAPILLARY BLOOD GLUCOSE        PT/INR - ( 05 May 2025 18:52 )   PT: 13.9 sec;   INR: 1.21 ratio         PTT - ( 05 May 2025 18:52 )  PTT:29.7 sec      Urinalysis Basic - ( 06 May 2025 07:15 )    Color: x / Appearance: x / SG: x / pH: x  Gluc: 90 mg/dL / Ketone: x  / Bili: x / Urobili: x   Blood: x / Protein: x / Nitrite: x   Leuk Esterase: x / RBC: x / WBC x   Sq Epi: x / Non Sq Epi: x / Bacteria: x          MEDICATIONS  (STANDING):  atorvastatin 20 milliGRAM(s) Oral at bedtime  doxycycline IVPB 100 milliGRAM(s) IV Intermittent every 12 hours  folic acid 1 milliGRAM(s) Oral daily  heparin   Injectable 5000 Unit(s) SubCutaneous every 8 hours  meropenem  IVPB 1000 milliGRAM(s) IV Intermittent every 8 hours  multivitamin 1 Tablet(s) Oral daily  thiamine 100 milliGRAM(s) Oral daily    MEDICATIONS  (PRN):  acetaminophen     Tablet .. 650 milliGRAM(s) Oral every 6 hours PRN Temp greater or equal to 38C (100.4F), Mild Pain (1 - 3)  benzocaine/menthol Lozenge 1 Lozenge Oral three times a day PRN Sore Throat  LORazepam   Injectable 2 milliGRAM(s) IV Push every 1 hour PRN CIWA 8-14  LORazepam   Injectable 4 milliGRAM(s) IV Push every 1 hour PRN CIWA 15 or greater, or seizure  melatonin 3 milliGRAM(s) Oral at bedtime PRN Insomnia  ondansetron Injectable 4 milliGRAM(s) IV Push every 8 hours PRN Nausea and/or Vomiting       INPATIENT MEDICINE PROGRESS NOTE:   Authored by Jasmin Junior MD     HISTORY OF PRESENT ILLNESS:    NAEON. Afebrile, HR 80s-100. SBPs 100s-120s. RA.     Seen and examined at bedside, patient reports feeling better and unsure what could have brought that on. Reports not having the palpitations, CP, night sweats anymore. Reports having had chemical peel done in feet while in [place] and recently had splinter in L hand where lesion was but not on the R sided lesion. Has not been sexually active with wife 6-7 years. Reports having had mosquito and bug exposure in [facility]. Denies nature exposure, animal bites. Denies hx of alcohol withdrawl but did not know what symptoms were for that. Went to [facility] in [facility] and had multiple sexual partners there with condom use.       PHYSICAL EXAM:  Vital Signs Last 24 Hrs  T(C): 37.8 (06 May 2025 08:06), Max: 37.8 (06 May 2025 08:06)  T(F): 100 (06 May 2025 08:06), Max: 100 (06 May 2025 08:06)  HR: 100 (06 May 2025 08:06) (84 - 119)  BP: 106/71 (06 May 2025 08:06) (101/67 - 140/69)  BP(mean): --  RR: 18 (06 May 2025 08:06) (17 - 18)  SpO2: 96% (06 May 2025 08:06) (94% - 99%)    Parameters below as of 06 May 2025 08:06  Patient On (Oxygen Delivery Method): room air        General: NAD, calm, cooperative, overweight habitus   Neuro: AAOx3, 5/5  strength b/l, 5/5 hip flexion/extension, spontaneity, no tongue fascitulations, no asterixis   HEENT: NC/AT, PERRLA b/l, EOMI, nonconjunctival pallor,   Chest: old scar in upper sternum with divet 0.5 cm from old I&D   Heart: S1/S2, RRR   Lungs: CTA b/l, nonlabored breathing   Abd: soft, nonTTP, nondistended   Ext: 1 cm firm dark blood collection lateral aspect of L 4th finger and 3rd middle digit of R hand, scatterd 3-4 simialr lesions on lateral sole aspect of feet b/l   Back: nonTTP   Psych: full range affect, hyperverbal, mutual topic, linear and goal directed thought process     I&O's Summary      LABS:                        15.3   11.10 )-----------( 314      ( 06 May 2025 07:17 )             45.0     05-06    131[L]  |  99  |  19  ----------------------------<  90  3.8   |  15[L]  |  1.32[H]    Ca    9.3      06 May 2025 07:15  Phos  2.9     05-06  Mg     2.4     05-06    TPro  8.6[H]  /  Alb  3.8  /  TBili  0.7  /  DBili  x   /  AST  27  /  ALT  37  /  AlkPhos  76  05-06    CAPILLARY BLOOD GLUCOSE        PT/INR - ( 05 May 2025 18:52 )   PT: 13.9 sec;   INR: 1.21 ratio         PTT - ( 05 May 2025 18:52 )  PTT:29.7 sec      Urinalysis Basic - ( 06 May 2025 07:15 )    Color: x / Appearance: x / SG: x / pH: x  Gluc: 90 mg/dL / Ketone: x  / Bili: x / Urobili: x   Blood: x / Protein: x / Nitrite: x   Leuk Esterase: x / RBC: x / WBC x   Sq Epi: x / Non Sq Epi: x / Bacteria: x          MEDICATIONS  (STANDING):  atorvastatin 20 milliGRAM(s) Oral at bedtime  doxycycline IVPB 100 milliGRAM(s) IV Intermittent every 12 hours  folic acid 1 milliGRAM(s) Oral daily  heparin   Injectable 5000 Unit(s) SubCutaneous every 8 hours  meropenem  IVPB 1000 milliGRAM(s) IV Intermittent every 8 hours  multivitamin 1 Tablet(s) Oral daily  thiamine 100 milliGRAM(s) Oral daily    MEDICATIONS  (PRN):  acetaminophen     Tablet .. 650 milliGRAM(s) Oral every 6 hours PRN Temp greater or equal to 38C (100.4F), Mild Pain (1 - 3)  benzocaine/menthol Lozenge 1 Lozenge Oral three times a day PRN Sore Throat  LORazepam   Injectable 2 milliGRAM(s) IV Push every 1 hour PRN CIWA 8-14  LORazepam   Injectable 4 milliGRAM(s) IV Push every 1 hour PRN CIWA 15 or greater, or seizure  melatonin 3 milliGRAM(s) Oral at bedtime PRN Insomnia  ondansetron Injectable 4 milliGRAM(s) IV Push every 8 hours PRN Nausea and/or Vomiting

## 2025-05-06 NOTE — H&P ADULT - NSHPSOCIALHISTORY_GEN_ALL_CORE
Daily alcohol use consisting of 3 "tall" beers per day. Last drink 4 days ago  Occasional cigarette use (1 cigarette on social occasions)

## 2025-05-06 NOTE — CONSULT NOTE ADULT - ASSESSMENT
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    1) New skin lesions to hands/ feet  He has a history of melioidoses  These lesions seen atypical for melioidoses    Check blood cultures times two   Check ECHO    Check HIV status   Check syphillis screen  Check urine for Gonorrhea NAAT  Check rickettsial serology  Check sed rate and crp    Consider dermatology evaluation  May need vasculitis work up if no other etiology    2) Chest pain   Location fo prior infection where melioidoses was diagnosed looks good  Imaging without focus of infection in the chest      Continue meropenem and doxycyline  for now  If blood cultures are negative, I would stop meropenem

## 2025-05-06 NOTE — PATIENT PROFILE ADULT - FUNCTIONAL ASSESSMENT - BASIC MOBILITY 6.
4-calculated by average/Not able to assess (calculate score using [facility] averaging method)

## 2025-05-06 NOTE — H&P ADULT - TIME BILLING
- Ordering, reviewing, and interpreting labs, testing, and imaging.  - Independently obtaining a review of systems and performing a physical exam  - Counselling and educating patient and family regarding interpretation of aforementioned items and plan of care.    Time spent on this encounter excludes teaching and separately reported services.

## 2025-05-06 NOTE — PROGRESS NOTE ADULT - ASSESSMENT
54M with history of HTN, HLD, meliodosis, sternal abscess s/p I&D here for subacute multiple symptoms (palpitations, CP, night sweats) and few scattered lesions on fingers and lateral aspect of feet with recent travel exposure to [place] and likely mild alcohol withdrawal.  54M with history of HTN, HLD, meliodosis, sternal abscess s/p I&D here for subacute multiple symptoms (palpitations, CP, night sweats) and few scattered lesions on fingers and lateral aspect of feet with recent travel exposure and multiple sexual partners in [place] and likely mild alcohol withdrawal.

## 2025-05-06 NOTE — H&P ADULT - PROBLEM SELECTOR PLAN 1
Reported fevers at home, leukocytosis, and tachycardia  Given clinical history, likely 2/2 meliodosis  - f/u BCx  - ID recs appreciated  - abx as below

## 2025-05-06 NOTE — H&P ADULT - PROBLEM SELECTOR PLAN 7
Likely related to sepsis. No PE or PNA on CTA chest.   - BNP and trop WNL  - not requiring supplemental O2

## 2025-05-06 NOTE — H&P ADULT - PROBLEM SELECTOR PLAN 5
Baseline SCr 0.9-1.0. SCr 2.12 on admission  Differential includes prerenal (sepsis mediated) vs ATN (e.g. from ibuprofen)  - urine studies  - trend SCr  - avoid nephrotoxins  - renal ultrasound, also to evaluate for renal abscesses (low concern)

## 2025-05-06 NOTE — CONSULT NOTE ADULT - SUBJECTIVE AND OBJECTIVE BOX
HPI:  54M pmhx   HTN  HLD,  sternal abcess s/p I&D and wound vac found to have meliodosis on wound culture  (trated with meropenem for 14 days, then Bactrim for 6 months Fall 2023--> Winter 2024    He had been in [facility] since the Mid January ([facility]--> [facility]---> [facility]) [place]    He  presented w/ palpitations and SOB x4 days.    He was in his usual state of health until the end of April .  Around 4/26, he felt fever/ chills.   He then developed skin lesions to his feet after a pedicure.   They lesions started as red macules.    The lesions turned black.   A few days later, he developed similar lesions to his bilateral hands.     He initially noticed generalized fatigue about 1 week ago, then he started noticing hand and foot dark lesions about 4 days ago. He tried to have the foot lesions treated at a pedicure. Patient was in [place] x3 months and returned 4 days ago. Around 5/1, he saw a physician in [facility]  who prescribed an antibiotic and an "antiinflammatory" medication. He then developed palpitations and SOB, which was particularly worsened over the past few days. Over this time, he has also been having night sweats and fevers. He has been taking Tylenol and ibuprofen.     He also reports daily alcohol use consisting of 3 "tall" beers per day, which he stopped about 3-4 days ago. Denies history of alcohol withdrawal or ICU for alcohol withdrawal. Denies current AV hallucinations, tremors.    ED course: ID consult --> doxy 100 x1, meropenem 1000mg x1. 1L LR (06 May 2025 00:23)      PAST MEDICAL & SURGICAL HISTORY:  HTN (hypertension)      Hyperlipidemia      2019 novel coronavirus disease (COVID-19)      Tuberculosis      Melioidosis      Abscess of chest wall          Allergies    No Known Allergies    Intolerances        ANTIMICROBIALS:  doxycycline IVPB 100 every 12 hours  meropenem  IVPB 1000 every 8 hours      OTHER MEDS:  acetaminophen     Tablet .. 650 milliGRAM(s) Oral every 6 hours PRN  atorvastatin 20 milliGRAM(s) Oral at bedtime  benzocaine/menthol Lozenge 1 Lozenge Oral three times a day PRN  folic acid 1 milliGRAM(s) Oral daily  heparin   Injectable 5000 Unit(s) SubCutaneous every 8 hours  melatonin 3 milliGRAM(s) Oral at bedtime PRN  multivitamin 1 Tablet(s) Oral daily  ondansetron Injectable 4 milliGRAM(s) IV Push every 8 hours PRN  thiamine 100 milliGRAM(s) Oral daily      SOCIAL HISTORY:  Single  3-4 new female sexual partners since January - he states that he uses condoms    FAMILY HISTORY:  FH: CAD (coronary artery disease) (Father, Mother)        REVIEW OF SYSTEMS  [  ] ROS unobtainable because:    [x  ] All other systems negative except as noted below:	    Constitutional:  [x ] fever [ ] chills  [ ] weight loss  [ x] weakness  Skin:  [ ] rash [ ] phlebitis	  Eyes: [ ] icterus [ ] pain  [ ] discharge	  ENMT: [ ] sore throat  [ ] thrush [ ] ulcers [ ] exudates  Respiratory: [ ] dyspnea [ ] hemoptysis [ ] cough [ ] sputum	  Cardiovascular:  [ ] chest pain [ ] palpitations [ ] edema	  Gastrointestinal:  [ ] nausea [ ] vomiting [ ] diarrhea [ ] constipation [ ] pain	  Genitourinary:  [ ] dysuria [ ] frequency [ ] hematuria [ ] discharge [ ] flank pain  [ ] incontinence  Musculoskeletal:  [ ] myalgias [ ] arthralgias [ ] arthritis  [ ] back pain  Neurological:  [ ] headache [ ] seizures  [ ] confusion/altered mental status  Psychiatric:  [ ] anxiety [ ] depression	  Hematology/Lymphatics:  [ ] lymphadenopathy  Endocrine:  [ ] adrenal [ ] thyroid  Allergic/Immunologic:	 [ ] transplant [ ] seasonal    PHYSICAL EXAM:  General: [x ] non-toxic  HEAD/EYES: [ ] PERRL [x ] white sclera [ ] icterus  ENT:  [ ] normal [ ] supple [ ] thrush [ ] pharyngeal exudate  Cardiovascular:   [ ] murmur [x ] normal [ ] PPM/AICD  Respiratory:  [x ] clear to ausculation bilaterally  GI:  [ x] soft, non-tender, normal bowel sounds  :  [ ] kent [ ] no CVA tenderness   Musculoskeletal:  [ ] no synovitis  Neurologic:  [ ] non-focal exam   Skin:  x[ ] bilateral hands with black pigmented skin lesions right third finger and left second finger---> raised, minimal erythema    Feet with bilateral lesions - smaller     Lymph: [ ] no lymphadenopathy  Psychiatric:  [x ] appropriate affect [ ] alert & oriented  Lines:  [x ] no phlebitis [ ] central line          Drug Dosing Weight  Height (cm): 162.6 (05 May 2025 17:04)  Weight (kg): 87.5 (05 May 2025 17:04)  BMI (kg/m2): 33.1 (05 May 2025 17:04)  BSA (m2): 1.93 (05 May 2025 17:04)    Vital Signs Last 24 Hrs  T(F): 99.9 (05-06-25 @ 15:50), Max: 100 (05-06-25 @ 08:06)    Vital Signs Last 24 Hrs  HR: 100 (05-06-25 @ 15:50) (68 - 119)  BP: 114/76 (05-06-25 @ 15:50) (101/67 - 140/69)  RR: 18 (05-06-25 @ 15:50)  SpO2: 97% (05-06-25 @ 15:50) (94% - 99%)  Wt(kg): --                          15.3   11.10 )-----------( 314      ( 06 May 2025 07:17 )             45.0       05-06    132[L]  |  101  |  18  ----------------------------<  104[H]  3.6   |  17[L]  |  1.27    Ca    8.9      06 May 2025 15:28  Phos  2.9     05-06  Mg     2.4     05-06    TPro  8.6[H]  /  Alb  3.8  /  TBili  0.7  /  DBili  x   /  AST  27  /  ALT  37  /  AlkPhos  76  05-06      Urinalysis Basic - ( 06 May 2025 15:28 )    Color: x / Appearance: x / SG: x / pH: x  Gluc: 104 mg/dL / Ketone: x  / Bili: x / Urobili: x   Blood: x / Protein: x / Nitrite: x   Leuk Esterase: x / RBC: x / WBC x   Sq Epi: x / Non Sq Epi: x / Bacteria: x        MICROBIOLOGY:    RADIOLOGY:  < from: CT Angio Chest PE Protocol w/ IV Cont (05.05.25 @ 18:40) >    ACC: [id number] EXAM:  CT ANGIO CHEST PULM ART WAWIC   ORDERED BY:  JORGE ALBERTO MILLS     PROCEDURE DATE:  05/05/2025          INTERPRETATION:  CLINICAL INFORMATION: Dyspnea. Evaluate for pulmonary   embolism.    COMPARISON: CT chest 9/15/2023.    CONTRAST/COMPLICATIONS:  IV Contrast: Omnipaque 350  53 cc administered   47 cc discarded  Oral Contrast: NONE    Complications: None documented at time of interpretation.  PROCEDURE:  CT Angiography of the Chest.  Sagittal and coronal reformats were performed as well as 3D (MIP)   reconstructions.    FINDINGS:    LUNGS AND LARGE AIRWAYS: Patent central airways. No pulmonary nodules.   Hypoventilatory changes are noted within the lung bases. No consolidation   identified.  PLEURA: No pleural effusion.  VESSELS: No pulmonary embolism. Minimal aortic calcifications.  HEART: Heart size is normal. No pericardial effusion.  MEDIASTINUM AND COOPER: No lymphadenopathy.  CHEST WALL AND LOWER NECK: The previously noted sternal abscess is no   longer present onthis exam.  VISUALIZED UPPER ABDOMEN: Probable 2.0 cm posterior right renal cyst   ([id number]).  BONES: Degenerative changes of the spine.    IMPRESSION:  No pulmonary embolism.    --- End of Report ---    < end of copied text >

## 2025-05-06 NOTE — H&P ADULT - NSHPPOAPULMEMBOLUS_GEN_A_CORE
Cough, congestion, sore throat, HAs   Good intake  Okay energy  She has missed several days of school  appt scheduled to r/o strep or ear infection  Aware at [facility]    
no
EOMI; PERRL; no drainage or redness

## 2025-05-06 NOTE — H&P ADULT - PROBLEM SELECTOR PLAN 2
H/o melioidoses - dx in 2023 on sternal abscess culture after surgical debridement. S/p meropenem and Bactrim eradication therapy  At risk given travel to [facility]  - ID consulted in ED, per ED provider note, ID recommending meropenem and doxy  - no obvious lesions to culture  - f/u BCx  - meropenem 1g q8h  - doxy 100 q12h

## 2025-05-07 ENCOUNTER — RESULT REVIEW (OUTPATIENT)
Age: 55
End: 2025-05-07

## 2025-05-07 LAB
ANION GAP SERPL CALC-SCNC: 14 MMOL/L — SIGNIFICANT CHANGE UP (ref 5–17)
BASOPHILS # BLD AUTO: 0 K/UL — SIGNIFICANT CHANGE UP (ref 0–0.2)
BASOPHILS NFR BLD AUTO: 0 % — SIGNIFICANT CHANGE UP (ref 0–2)
BUN SERPL-MCNC: 17 MG/DL — SIGNIFICANT CHANGE UP (ref 7–23)
C TRACH RRNA SPEC QL NAA+PROBE: SIGNIFICANT CHANGE UP
CALCIUM SERPL-MCNC: 9.1 MG/DL — SIGNIFICANT CHANGE UP (ref 8.4–10.5)
CHLORIDE SERPL-SCNC: 100 MMOL/L — SIGNIFICANT CHANGE UP (ref 96–108)
CO2 SERPL-SCNC: 16 MMOL/L — LOW (ref 22–31)
CREAT SERPL-MCNC: 1.09 MG/DL — SIGNIFICANT CHANGE UP (ref 0.5–1.3)
CRP SERPL-MCNC: 90 MG/L — HIGH (ref 0–4)
CULTURE RESULTS: NO GROWTH — SIGNIFICANT CHANGE UP
EGFR: 82 ML/MIN/1.73M2 — SIGNIFICANT CHANGE UP
EGFR: 82 ML/MIN/1.73M2 — SIGNIFICANT CHANGE UP
EOSINOPHIL # BLD AUTO: 0 K/UL — SIGNIFICANT CHANGE UP (ref 0–0.5)
EOSINOPHIL NFR BLD AUTO: 0 % — SIGNIFICANT CHANGE UP (ref 0–6)
ERYTHROCYTE [SEDIMENTATION RATE] IN BLOOD: 86 MM/HR — HIGH (ref 0–20)
GLUCOSE BLDC GLUCOMTR-MCNC: 90 MG/DL — SIGNIFICANT CHANGE UP (ref 70–99)
GLUCOSE SERPL-MCNC: 95 MG/DL — SIGNIFICANT CHANGE UP (ref 70–99)
HCT VFR BLD CALC: 44.8 % — SIGNIFICANT CHANGE UP (ref 39–50)
HGB BLD-MCNC: 14.9 G/DL — SIGNIFICANT CHANGE UP (ref 13–17)
HIV 1+2 AB+HIV1 P24 AG SERPL QL IA: SIGNIFICANT CHANGE UP
LYMPHOCYTES # BLD AUTO: 0.68 K/UL — LOW (ref 1–3.3)
LYMPHOCYTES # BLD AUTO: 9.4 % — LOW (ref 13–44)
MANUAL SMEAR VERIFICATION: SIGNIFICANT CHANGE UP
MCHC RBC-ENTMCNC: 27.2 PG — SIGNIFICANT CHANGE UP (ref 27–34)
MCHC RBC-ENTMCNC: 33.3 G/DL — SIGNIFICANT CHANGE UP (ref 32–36)
MCV RBC AUTO: 81.9 FL — SIGNIFICANT CHANGE UP (ref 80–100)
MONOCYTES # BLD AUTO: 0.92 K/UL — HIGH (ref 0–0.9)
MONOCYTES NFR BLD AUTO: 12.8 % — SIGNIFICANT CHANGE UP (ref 2–14)
NEUTROPHILS # BLD AUTO: 5.41 K/UL — SIGNIFICANT CHANGE UP (ref 1.8–7.4)
NEUTROPHILS NFR BLD AUTO: 73.5 % — SIGNIFICANT CHANGE UP (ref 43–77)
NEUTS BAND # BLD: 1.7 % — SIGNIFICANT CHANGE UP (ref 0–8)
NEUTS BAND NFR BLD: 1.7 % — SIGNIFICANT CHANGE UP (ref 0–8)
PLAT MORPH BLD: NORMAL — SIGNIFICANT CHANGE UP
PLATELET # BLD AUTO: 289 K/UL — SIGNIFICANT CHANGE UP (ref 150–400)
POTASSIUM SERPL-MCNC: 3.9 MMOL/L — SIGNIFICANT CHANGE UP (ref 3.5–5.3)
POTASSIUM SERPL-SCNC: 3.9 MMOL/L — SIGNIFICANT CHANGE UP (ref 3.5–5.3)
R RICKETTSI AB SER-ACNC: SIGNIFICANT CHANGE UP
R RICKETTSI IGM SER-ACNC: SIGNIFICANT CHANGE UP
RBC # BLD: 5.47 M/UL — SIGNIFICANT CHANGE UP (ref 4.2–5.8)
RBC # FLD: 13 % — SIGNIFICANT CHANGE UP (ref 10.3–14.5)
RBC BLD AUTO: SIGNIFICANT CHANGE UP
RICK SF IGG TITR SER IF: SIGNIFICANT CHANGE UP
RICK SF IGM TITR SER IF: SIGNIFICANT CHANGE UP
ROCKY MT SPOTTED FEVER COMMENT: SIGNIFICANT CHANGE UP
S PYO AG SPEC QL IA: NEGATIVE — SIGNIFICANT CHANGE UP
SODIUM SERPL-SCNC: 130 MMOL/L — LOW (ref 135–145)
SPECIMEN SOURCE: SIGNIFICANT CHANGE UP
SPECIMEN SOURCE: SIGNIFICANT CHANGE UP
T PALLIDUM AB TITR SER: NEGATIVE — SIGNIFICANT CHANGE UP
VARIANT LYMPHS # BLD: 2.6 % — SIGNIFICANT CHANGE UP (ref 0–6)
VARIANT LYMPHS NFR BLD MANUAL: 2.6 % — SIGNIFICANT CHANGE UP (ref 0–6)
WBC # BLD: 7.2 K/UL — SIGNIFICANT CHANGE UP (ref 3.8–10.5)
WBC # FLD AUTO: 7.2 K/UL — SIGNIFICANT CHANGE UP (ref 3.8–10.5)

## 2025-05-07 PROCEDURE — 11104 PUNCH BX SKIN SINGLE LESION: CPT

## 2025-05-07 PROCEDURE — 88305 TISSUE EXAM BY PATHOLOGIST: CPT | Mod: 26

## 2025-05-07 PROCEDURE — 93306 TTE W/DOPPLER COMPLETE: CPT | Mod: 26

## 2025-05-07 PROCEDURE — 99232 SBSQ HOSP IP/OBS MODERATE 35: CPT | Mod: GC

## 2025-05-07 PROCEDURE — 99232 SBSQ HOSP IP/OBS MODERATE 35: CPT

## 2025-05-07 PROCEDURE — 11105 PUNCH BX SKIN EA SEP/ADDL: CPT

## 2025-05-07 PROCEDURE — 99223 1ST HOSP IP/OBS HIGH 75: CPT | Mod: GC,25

## 2025-05-07 PROCEDURE — 88312 SPECIAL STAINS GROUP 1: CPT | Mod: 26

## 2025-05-07 RX ORDER — CEFTRIAXONE 500 MG/1
1000 INJECTION, POWDER, FOR SOLUTION INTRAMUSCULAR; INTRAVENOUS EVERY 24 HOURS
Refills: 0 | Status: DISCONTINUED | OUTPATIENT
Start: 2025-05-07 | End: 2025-05-09

## 2025-05-07 RX ORDER — ATORVASTATIN CALCIUM 80 MG/1
40 TABLET, FILM COATED ORAL AT BEDTIME
Refills: 0 | Status: DISCONTINUED | OUTPATIENT
Start: 2025-05-07 | End: 2025-05-09

## 2025-05-07 RX ADMIN — CEFTRIAXONE 100 MILLIGRAM(S): 500 INJECTION, POWDER, FOR SOLUTION INTRAMUSCULAR; INTRAVENOUS at 17:02

## 2025-05-07 RX ADMIN — Medication 1 TABLET(S): at 11:03

## 2025-05-07 RX ADMIN — MEROPENEM 100 MILLIGRAM(S): 1 INJECTION INTRAVENOUS at 06:01

## 2025-05-07 RX ADMIN — ATORVASTATIN CALCIUM 40 MILLIGRAM(S): 80 TABLET, FILM COATED ORAL at 22:07

## 2025-05-07 RX ADMIN — Medication 650 MILLIGRAM(S): at 20:28

## 2025-05-07 RX ADMIN — Medication 650 MILLIGRAM(S): at 08:11

## 2025-05-07 RX ADMIN — Medication 650 MILLIGRAM(S): at 09:11

## 2025-05-07 RX ADMIN — Medication 100 MILLIGRAM(S): at 22:10

## 2025-05-07 RX ADMIN — FOLIC ACID 1 MILLIGRAM(S): 1 TABLET ORAL at 11:03

## 2025-05-07 RX ADMIN — MEROPENEM 100 MILLIGRAM(S): 1 INJECTION INTRAVENOUS at 13:19

## 2025-05-07 RX ADMIN — Medication 100 MILLIGRAM(S): at 11:02

## 2025-05-07 NOTE — PROGRESS NOTE ADULT - PROBLEM SELECTOR PLAN 3
Scattered lesions on fingers/lateral soles of feet. Unclear etiology. Recent splinter and mosuiqto exposure. no recent sexual activity with wife. Recent chemical/water party exposure to feet from salon, but not to hands. DDx STI vs zoonotic infection vs rheumatic fever     -Monitor   -Pending ID  -F/u brucella IgG/IgM, leishmaniasis Ab, leptospiorosis Ab , timothy mountain spotted fever IgG/IgM, schihistoma Ab IgG,  -F/u ANCA and ESR/CRP   -Pending TTE to r/o valve disease and.or vegetations   -HIV and RPR negative   -Pending urine chlymydia/gonorrhea Scattered lesions on fingers/lateral soles of feet. Unclear etiology. Recent splinter and mosuiqto exposure. no recent sexual activity with wife. Recent chemical/water party exposure to feet from salon, but not to hands. DDx STI vs zoonotic infection vs rheumatic fever     -Monitor   -Appreciate ID recommendations   -Pending dermatology recommendations   -F/u brucella IgG/IgM, leishmaniasis Ab, leptospiorosis Ab , timothy mountain spotted fever IgG/IgM, schihistoma Ab IgG,  -F/u ANCA and ESR/CRP   -TTE showing LVEF 59% without RWA nor other valve disease   -HIV and RPR negative   -Pending urine chlymydia/gonorrhea

## 2025-05-07 NOTE — CONSULT NOTE ADULT - ATTENDING COMMENTS
Non-specific crusted papules on distal acral sites. Considering the travel history and prior melioidoses, biopsies performed for H&E and tissue culture. The yield may be low considering these lesions appear to be improving. Please inform dermatology should new lesions develop.    Stanley Navarrete MD, PharmD, MPH  Co-Director, Inpatient Dermatology Consultation Service, [facility]

## 2025-05-07 NOTE — PROGRESS NOTE ADULT - PROBLEM SELECTOR PLAN 2
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 Recent travel exposure.     -Pending ID consult   -C/w abx as above  -F/u urine cx, and blood cx 5/6 History of meliodosis on sternal abscess culture that required meropenem and TMP-SMX eradication therapy. Recent travel exposure.     -Pending ID consult   -C/w abx as above  -urine cx, and blood cx 5/5 NGTD

## 2025-05-07 NOTE — PROGRESS NOTE ADULT - PROBLEM SELECTOR PLAN 5
Baseline SCr 0.9-1.0. SCr 2.12 on admission, currently downtrending to 1.32. DDx: prerenal vs ATN vs ibuprofen. Urine osmolality 669 and Na 73.     -Avoid nephrotoxics   -Monitor BUN/Cr   -Encourage po intake #RESOLVED   Baseline SCr 0.9-1.0. SCr 2.12 on admission, currently downtrending to 1..09. DDx: prerenal vs ATN vs ibuprofen. Urine osmolality 669 and Na 73.     -Avoid nephrotoxics   -Monitor BUN/Cr   -Encourage po intake

## 2025-05-07 NOTE — PROGRESS NOTE ADULT - PROBLEM SELECTOR PLAN 6
Likely related to sepsis. No PE on CTA chest  EKG sinus tachycardia, non ischemic  Troponins WNL  - differential also includes alcohol withdrawal Euvolvemic hyponatremia.     -Studies consistent with SIADH   - s/p 1L LR in ED  -F/u TSH and cortisol AM

## 2025-05-07 NOTE — PROGRESS NOTE ADULT - SUBJECTIVE AND OBJECTIVE BOX
INPATIENT MEDICINE PROGRESS NOTE:   Authored by Jasmin Junior MD     HISTORY OF PRESENT ILLNESS:    Overnight had fever 100.5 and got acetaminophen. Otherwise SBPs 110-120s, HR 90s-100. RA.     Seen and examined at bedside...     PHYSICAL EXAM:  Vital Signs Last 24 Hrs  T(C): 38 (07 May 2025 07:45), Max: 38.1 (06 May 2025 19:57)  T(F): 100.4 (07 May 2025 07:45), Max: 100.5 (06 May 2025 19:57)  HR: 91 (07 May 2025 07:45) (68 - 100)  BP: 116/81 (07 May 2025 07:45) (111/70 - 127/75)  BP(mean): --  RR: 18 (07 May 2025 07:45) (18 - 18)  SpO2: 97% (07 May 2025 07:45) (93% - 97%)    Parameters below as of 07 May 2025 07:45  Patient On (Oxygen Delivery Method): room air        General: NAD, calm, cooperative, overweight habitus   Neuro: AAOx3, 5/5  strength b/l, 5/5 hip flexion/extension, spontaneity, no tongue fascitulations, no asterixis   HEENT: NC/AT, PERRLA b/l, EOMI, nonconjunctival pallor,   Chest: old scar in upper sternum with divet 0.5 cm from old I&D   Heart: S1/S2, RRR   Lungs: CTA b/l, nonlabored breathing   Abd: soft, nonTTP, nondistended   Ext: 1 cm firm dark blood collection lateral aspect of L 4th finger and 3rd middle digit of R hand, scatterd 3-4 simialr lesions on lateral sole aspect of feet b/l   Back: nonTTP   Psych: full range affect, hyperverbal, mutual topic, linear and goal directed thought process         I&O's Summary    06 May 2025 07:01  -  07 May 2025 07:00  --------------------------------------------------------  IN: 540 mL / OUT: 300 mL / NET: 240 mL        LABS:                        14.9   7.20  )-----------( 289      ( 07 May 2025 07:09 )             44.8     05-07    130[L]  |  100  |  17  ----------------------------<  95  3.9   |  16[L]  |  1.09    Ca    9.1      07 May 2025 07:09  Phos  2.9     05-06  Mg     2.4     05-06    TPro  8.6[H]  /  Alb  3.8  /  TBili  0.7  /  DBili  x   /  AST  27  /  ALT  37  /  AlkPhos  76  05-06    CAPILLARY BLOOD GLUCOSE        PT/INR - ( 05 May 2025 18:52 )   PT: 13.9 sec;   INR: 1.21 ratio         PTT - ( 05 May 2025 18:52 )  PTT:29.7 sec      Urinalysis Basic - ( 07 May 2025 07:09 )    Color: x / Appearance: x / SG: x / pH: x  Gluc: 95 mg/dL / Ketone: x  / Bili: x / Urobili: x   Blood: x / Protein: x / Nitrite: x   Leuk Esterase: x / RBC: x / WBC x   Sq Epi: x / Non Sq Epi: x / Bacteria: x        Culture - Urine (collected 06 May 2025 03:45)  Source: Clean Catch Clean Catch (Midstream)  Final Report (07 May 2025 07:04):    No growth    Culture - Blood (collected 05 May 2025 18:37)  Source: Blood Blood-Peripheral  Preliminary Report (06 May 2025 22:03):    No growth at 24 hours    Culture - Blood (collected 05 May 2025 18:00)  Source: Blood Blood-Peripheral  Preliminary Report (06 May 2025 22:03):    No growth at 24 hours        MEDICATIONS  (STANDING):  atorvastatin 20 milliGRAM(s) Oral at bedtime  doxycycline IVPB 100 milliGRAM(s) IV Intermittent every 12 hours  folic acid 1 milliGRAM(s) Oral daily  heparin   Injectable 5000 Unit(s) SubCutaneous every 8 hours  meropenem  IVPB 1000 milliGRAM(s) IV Intermittent every 8 hours  multivitamin 1 Tablet(s) Oral daily  thiamine 100 milliGRAM(s) Oral daily    MEDICATIONS  (PRN):  acetaminophen     Tablet .. 650 milliGRAM(s) Oral every 6 hours PRN Temp greater or equal to 38C (100.4F), Mild Pain (1 - 3)  benzocaine/menthol Lozenge 1 Lozenge Oral three times a day PRN Sore Throat  melatonin 3 milliGRAM(s) Oral at bedtime PRN Insomnia  ondansetron Injectable 4 milliGRAM(s) IV Push every 8 hours PRN Nausea and/or Vomiting       INPATIENT MEDICINE PROGRESS NOTE:   Authored by Jasmin Junior MD     HISTORY OF PRESENT ILLNESS:    Overnight had fever 100.5 and got acetaminophen. Otherwise SBPs 110-120s, HR 90s-100. RA.     Seen and examined at bedside...     PHYSICAL EXAM:  Vital Signs Last 24 Hrs  T(C): 38 (07 May 2025 07:45), Max: 38.1 (06 May 2025 19:57)  T(F): 100.4 (07 May 2025 07:45), Max: 100.5 (06 May 2025 19:57)  HR: 91 (07 May 2025 07:45) (68 - 100)  BP: 116/81 (07 May 2025 07:45) (111/70 - 127/75)  BP(mean): --  RR: 18 (07 May 2025 07:45) (18 - 18)  SpO2: 97% (07 May 2025 07:45) (93% - 97%)    Parameters below as of 07 May 2025 07:45  Patient On (Oxygen Delivery Method): room air        General: NAD, calm, cooperative, overweight habitus   Neuro: AAOx3, 5/5  strength b/l, 5/5 hip flexion/extension, spontaneity, no tongue fascitulations, no asterixis   HEENT: NC/AT, PERRLA b/l, EOMI, nonconjunctival pallor, erythema and edematous tonsils with 1 L sided pustule   Chest: old scar in upper sternum with divet 0.5 cm from old I&D   Heart: S1/S2, RRR   Lungs: CTA b/l, nonlabored breathing   Abd: soft, nonTTP, nondistended   Ext: 1 cm firm dark blood collection lateral aspect of L 4th finger and 3rd middle digit of R hand, scatterd 3-4 simialr lesions on lateral sole aspect of feet b/l   Back: nonTTP   Psych: full range affect, hyperverbal, mutual topic, linear and goal directed thought process         I&O's Summary    06 May 2025 07:01  -  07 May 2025 07:00  --------------------------------------------------------  IN: 540 mL / OUT: 300 mL / NET: 240 mL        LABS:                        14.9   7.20  )-----------( 289      ( 07 May 2025 07:09 )             44.8     05-07    130[L]  |  100  |  17  ----------------------------<  95  3.9   |  16[L]  |  1.09    Ca    9.1      07 May 2025 07:09  Phos  2.9     05-06  Mg     2.4     05-06    TPro  8.6[H]  /  Alb  3.8  /  TBili  0.7  /  DBili  x   /  AST  27  /  ALT  37  /  AlkPhos  76  05-06    CAPILLARY BLOOD GLUCOSE        PT/INR - ( 05 May 2025 18:52 )   PT: 13.9 sec;   INR: 1.21 ratio         PTT - ( 05 May 2025 18:52 )  PTT:29.7 sec      Urinalysis Basic - ( 07 May 2025 07:09 )    Color: x / Appearance: x / SG: x / pH: x  Gluc: 95 mg/dL / Ketone: x  / Bili: x / Urobili: x   Blood: x / Protein: x / Nitrite: x   Leuk Esterase: x / RBC: x / WBC x   Sq Epi: x / Non Sq Epi: x / Bacteria: x        Culture - Urine (collected 06 May 2025 03:45)  Source: Clean Catch Clean Catch (Midstream)  Final Report (07 May 2025 07:04):    No growth    Culture - Blood (collected 05 May 2025 18:37)  Source: Blood Blood-Peripheral  Preliminary Report (06 May 2025 22:03):    No growth at 24 hours    Culture - Blood (collected 05 May 2025 18:00)  Source: Blood Blood-Peripheral  Preliminary Report (06 May 2025 22:03):    No growth at 24 hours        MEDICATIONS  (STANDING):  atorvastatin 20 milliGRAM(s) Oral at bedtime  doxycycline IVPB 100 milliGRAM(s) IV Intermittent every 12 hours  folic acid 1 milliGRAM(s) Oral daily  heparin   Injectable 5000 Unit(s) SubCutaneous every 8 hours  meropenem  IVPB 1000 milliGRAM(s) IV Intermittent every 8 hours  multivitamin 1 Tablet(s) Oral daily  thiamine 100 milliGRAM(s) Oral daily    MEDICATIONS  (PRN):  acetaminophen     Tablet .. 650 milliGRAM(s) Oral every 6 hours PRN Temp greater or equal to 38C (100.4F), Mild Pain (1 - 3)  benzocaine/menthol Lozenge 1 Lozenge Oral three times a day PRN Sore Throat  melatonin 3 milliGRAM(s) Oral at bedtime PRN Insomnia  ondansetron Injectable 4 milliGRAM(s) IV Push every 8 hours PRN Nausea and/or Vomiting       INPATIENT MEDICINE PROGRESS NOTE:   Authored by Jasmin Junior MD     HISTORY OF PRESENT ILLNESS:    Overnight had fever 100.5 and got acetaminophen. Otherwise SBPs 110-120s, HR 90s-100. RA.     Seen and examined at bedside...     PHYSICAL EXAM:  Vital Signs Last 24 Hrs  T(C): 38 (07 May 2025 07:45), Max: 38.1 (06 May 2025 19:57)  T(F): 100.4 (07 May 2025 07:45), Max: 100.5 (06 May 2025 19:57)  HR: 91 (07 May 2025 07:45) (68 - 100)  BP: 116/81 (07 May 2025 07:45) (111/70 - 127/75)  BP(mean): --  RR: 18 (07 May 2025 07:45) (18 - 18)  SpO2: 97% (07 May 2025 07:45) (93% - 97%)    Parameters below as of 07 May 2025 07:45  Patient On (Oxygen Delivery Method): room air        General: NAD, calm, cooperative, overweight habitus   Neuro: AAOx3, 5/5  strength b/l, 5/5 hip flexion/extension, spontaneity, no tongue fascitulations, no asterixis   HEENT: NC/AT, PERRLA b/l, EOMI, nonconjunctival pallor, erythema and edematous tonsils with 1 L sided tonsil stone  Chest: old scar in upper sternum with divet 0.5 cm from old I&D   Heart: S1/S2, RRR   Lungs: CTA b/l, nonlabored breathing   Abd: soft, nonTTP, nondistended   Ext: 1 cm firm dark blood collection lateral aspect of L 4th finger and 3rd middle digit of R hand, scatterd 3-4 simialr lesions on lateral sole aspect of feet b/l   Back: nonTTP   Psych: full range affect, hyperverbal, mutual topic, linear and goal directed thought process         I&O's Summary    06 May 2025 07:01  -  07 May 2025 07:00  --------------------------------------------------------  IN: 540 mL / OUT: 300 mL / NET: 240 mL        LABS:                        14.9   7.20  )-----------( 289      ( 07 May 2025 07:09 )             44.8     05-07    130[L]  |  100  |  17  ----------------------------<  95  3.9   |  16[L]  |  1.09    Ca    9.1      07 May 2025 07:09  Phos  2.9     05-06  Mg     2.4     05-06    TPro  8.6[H]  /  Alb  3.8  /  TBili  0.7  /  DBili  x   /  AST  27  /  ALT  37  /  AlkPhos  76  05-06    CAPILLARY BLOOD GLUCOSE        PT/INR - ( 05 May 2025 18:52 )   PT: 13.9 sec;   INR: 1.21 ratio         PTT - ( 05 May 2025 18:52 )  PTT:29.7 sec      Urinalysis Basic - ( 07 May 2025 07:09 )    Color: x / Appearance: x / SG: x / pH: x  Gluc: 95 mg/dL / Ketone: x  / Bili: x / Urobili: x   Blood: x / Protein: x / Nitrite: x   Leuk Esterase: x / RBC: x / WBC x   Sq Epi: x / Non Sq Epi: x / Bacteria: x        Culture - Urine (collected 06 May 2025 03:45)  Source: Clean Catch Clean Catch (Midstream)  Final Report (07 May 2025 07:04):    No growth    Culture - Blood (collected 05 May 2025 18:37)  Source: Blood Blood-Peripheral  Preliminary Report (06 May 2025 22:03):    No growth at 24 hours    Culture - Blood (collected 05 May 2025 18:00)  Source: Blood Blood-Peripheral  Preliminary Report (06 May 2025 22:03):    No growth at 24 hours        MEDICATIONS  (STANDING):  atorvastatin 20 milliGRAM(s) Oral at bedtime  doxycycline IVPB 100 milliGRAM(s) IV Intermittent every 12 hours  folic acid 1 milliGRAM(s) Oral daily  heparin   Injectable 5000 Unit(s) SubCutaneous every 8 hours  meropenem  IVPB 1000 milliGRAM(s) IV Intermittent every 8 hours  multivitamin 1 Tablet(s) Oral daily  thiamine 100 milliGRAM(s) Oral daily    MEDICATIONS  (PRN):  acetaminophen     Tablet .. 650 milliGRAM(s) Oral every 6 hours PRN Temp greater or equal to 38C (100.4F), Mild Pain (1 - 3)  benzocaine/menthol Lozenge 1 Lozenge Oral three times a day PRN Sore Throat  melatonin 3 milliGRAM(s) Oral at bedtime PRN Insomnia  ondansetron Injectable 4 milliGRAM(s) IV Push every 8 hours PRN Nausea and/or Vomiting

## 2025-05-07 NOTE — CONSULT NOTE ADULT - ASSESSMENT
#Papulonodular eruption, hands, feet  - Infectious ddx favored including atypical mycobacterium, ecthyma, other deep infection; can also consider vasculopathy 2/2 thromboembolic disease   - TTE WNL  - Blood cultures negative  - HIV, RPR, rapid Strep negative  - On meropenem, doxy, now switched to ceftriaxone  - WBC count 12.4>11>7.2    PLAN:  - Continue broad antimicrobial coverage as per ID  - Punch biopsy x2 (H&E, tissue culture - bacterial, fungal, AFB) performed to aid in diagnosis as per below  - Our team will ensure that, if present, Burkholderia pseudomallei can be cultured through this sample     Dermatology Punch Biopsy Procedure Note    After risks and benefits of procedure including bleeding, infection and scar were reviewed (consents including photo consent reviewed, signed and in chart), allergies were reviewed and time out performed. Written consent given by the patient.    Area cleaned with rubbing alcohol and anesthetized with lidocaine and epinephrine.  A 4mm punch biopsy was performed to R dorsal thumb (H&E), L ventral fourth finger (tissue culture), hemostasis achieved with multiple dissolvable chromic gut sutures and gel foam packing with pressure dressing placed.   Wound care reviewed with patient and team. Biopsy site should remain covered with pressure bandage for 24-48 hours. Then apply Vaseline to biopsy site daily and cover with bandage until healed.      Patient was seen at bedside and staffed in person with the dermatology attending Dr. Navarrete.  Recommendations were communicated with the primary team.  Please page [phone] for further related questions.  Dermatology will continue to follow.     Morgan Gonzalez MD  Resident Physician, PGY2  [facility]at[facility]  Pager: [phone]  Office: [phone].   #Papulonodular eruption, hands, feet  - Infectious ddx favored including atypical mycobacterium, ecthyma, other deep infection; can also consider vasculopathy 2/2 thromboembolic disease   - TTE WNL  - Blood cultures negative  - HIV, RPR, rapid Strep negative  - On meropenem, doxy, now switched to ceftriaxone  - WBC count 12.4>11>7.2    PLAN:  - Continue broad antimicrobial coverage as per ID  - Punch biopsy x2 (H&E, tissue culture - bacterial, fungal, AFB) performed to aid in diagnosis as per below  - Will contact micro lab in case Burkholderia pseudomallei requires a different culture medium    Dermatology Punch Biopsy Procedure Note    After risks and benefits of procedure including bleeding, infection and scar were reviewed (consents including photo consent reviewed, signed and in chart), allergies were reviewed and time out performed. Written consent given by the patient.    Area cleaned with rubbing alcohol and anesthetized with lidocaine and epinephrine.  A 4mm punch biopsy was performed to R dorsal thumb (H&E), L ventral fourth finger (tissue culture), hemostasis achieved with multiple dissolvable chromic gut sutures and gel foam packing with pressure dressing placed.   Wound care reviewed with patient and team. Biopsy site should remain covered with pressure bandage for 24-48 hours. Then apply Vaseline to biopsy site daily and cover with bandage until healed.      Patient was seen at bedside and staffed in person with the dermatology attending Dr. Navarrete.  Recommendations were communicated with the primary team.  Please page [phone] for further related questions.  Dermatology will continue to follow.     Morgan Gonzalez MD  Resident Physician, PGY2  [facility] Dermat[facility]  Pager: [phone]  Office: [phone].

## 2025-05-07 NOTE — PROGRESS NOTE ADULT - ASSESSMENT
54M with history of HTN, HLD, meliodosis, sternal abscess s/p I&D here for subacute multiple symptoms (palpitations, CP, night sweats) and few scattered lesions on fingers and lateral aspect of feet with recent travel exposure and multiple sexual partners in [place] and likely mild alcohol withdrawal; currently remains on empiric abx.

## 2025-05-07 NOTE — PROGRESS NOTE ADULT - PROBLEM SELECTOR PLAN 9
Normotensive.     -Hold home olmesartan 20 mg po qd Code: FULL   DVT ppx: none, IMPROVE score 0   Diet: reg/DASH  Dispo: pending clinical improvement

## 2025-05-07 NOTE — PROGRESS NOTE ADULT - PROBLEM SELECTOR PLAN 11
Code: FULL   DVT ppx: heparin 5000 U sq q8   Diet: reg/DASH  Dispo: pending clinical improvement
Code: FULL   DVT ppx: heparin 5000 U sq q8   Diet: reg/DASH  Dispo: pending clinical improvement

## 2025-05-07 NOTE — PROGRESS NOTE ADULT - PROBLEM SELECTOR PLAN 4
No hx alcohol withdrawal. Arrived with palpitations, anxiety. No fasciculations or asterixis.     -D/c JENAWA No hx alcohol withdrawal. Arrived with palpitations, anxiety. No fasciculations or asterixis.     -No CIWA

## 2025-05-07 NOTE — PROGRESS NOTE ADULT - PROBLEM SELECTOR PLAN 7
Likely related to sepsis. No PE or PNA on CTA chest.   - BNP and trop WNL  - not requiring supplemental O2 Normotensive.     -Hold home olmesartan 20 mg po qd

## 2025-05-07 NOTE — CONSULT NOTE ADULT - CONSULT REQUESTED DATE/TIME
07-May-2025 21:43
06-May-2025 16:41
Qbrexza Counseling:  I discussed with the patient the risks of Qbrexza including but not limited to headache, mydriasis, blurred vision, dry eyes, nasal dryness, dry mouth, dry throat, dry skin, urinary hesitation, and constipation.  Local skin reactions including erythema, burning, stinging, and itching can also occur.

## 2025-05-07 NOTE — PROGRESS NOTE ADULT - PROBLEM SELECTOR PLAN 8
Euvolvemic hyponatremia.     -Studies consistent with SIADH   - s/p 1L LR in ED -Uptitrate atoravastatin 20 to 40 mg po qhs

## 2025-05-07 NOTE — PROGRESS NOTE ADULT - PROBLEM SELECTOR PLAN 1
Reported fever at home with recurrent fever; arrived with leukocytosis WBC 12 and downtrending. Intermittent tachycardia. Recent travel exposure, multiple sexual partners with reported condom use, and development of nonspecific symptoms and dark lesions on fingers/hands. DDx: meliodosis vs STI vs other zoonotic infection vs less likely rheumatic fever     -Appreciate ID recommendations   -5/5 blood and urine cx NGTD   -C/w doxycycline 100 mg IV q12 (started 5/6)   -C/w meropenem 1000 mg IV q8 (started 5/6) Reported fever at home with recurrent fever; arrived with leukocytosis WBC 12 and downtrending. Intermittent tachycardia. Recent travel exposure, multiple sexual partners with reported condom use, and development of nonspecific symptoms and dark lesions on fingers/hands. DDx: meliodosis vs STI vs other zoonotic infection vs less likely rheumatic fever     -Appreciate ID recommendations   -5/5 blood and urine cx NGTD   -C/w doxycycline 100 mg IV q12 (started 5/6)   -C/w meropenem 1000 mg IV q8 (started 5/6)  -F/u throat cx  -Repeat 5/8 blood cx AM Reported fever at home with recurrent fever; arrived with leukocytosis WBC 12 and downtrending. Intermittent tachycardia. Recent travel exposure, multiple sexual partners with reported condom use, and development of nonspecific symptoms and dark lesions on fingers/hands. Tonsillitis with 1 pustule vs stone. DDx: meliodosis vs STI vs other zoonotic infection vs less likely rheumatic fever     -Appreciate ID recommendations   -5/5 blood and urine cx NGTD   -C/w doxycycline 100 mg IV q12 (started 5/6)   -C/w meropenem 1000 mg IV q8 (started 5/6)  -F/u throat cx to r/o strep throat   -Repeat 5/8 blood cx AM

## 2025-05-07 NOTE — PROGRESS NOTE ADULT - ASSESSMENT
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    1) New skin lesions to hands/ feet  He has a history of melioidoses  These lesions seem atypical for melioidoses    Check ECHO    Check HIV status   Check syphillis screen  Check urine for Gonorrhea NAAT  Check rickettsial serology  Check sed rate and crp    Consider dermatology evaluation  May need vasculitis work up if no other etiology  Endovascular infection on ddx    2) Chest pain   Location of prior infection where melioidoses was diagnosed looks good  Imaging without focus of infection in the chest    3) Sore throat  Check throat culture    Continuedoxycyline  for now  Change doxycyline to ceftriaxone 1 gram daily    Case dw primary team

## 2025-05-07 NOTE — PROGRESS NOTE ADULT - SUBJECTIVE AND OBJECTIVE BOX
Follow Up:      Inverval History/ROS:Patient is a 54y old  Male who presents with a chief complaint of chest pain, fevers (07 May 2025 08:48)    C/o sore throat  + Fever    Allergies    No Known Allergies    Intolerances        ANTIMICROBIALS:  doxycycline IVPB 100 every 12 hours  meropenem  IVPB 1000 every 8 hours      OTHER MEDS:  acetaminophen     Tablet .. 650 milliGRAM(s) Oral every 6 hours PRN  atorvastatin 40 milliGRAM(s) Oral at bedtime  benzocaine/menthol Lozenge 1 Lozenge Oral three times a day PRN  folic acid 1 milliGRAM(s) Oral daily  melatonin 3 milliGRAM(s) Oral at bedtime PRN  multivitamin 1 Tablet(s) Oral daily  ondansetron Injectable 4 milliGRAM(s) IV Push every 8 hours PRN  thiamine 100 milliGRAM(s) Oral daily      Vital Signs Last 24 Hrs  T(C): 38 (07 May 2025 07:45), Max: 38.1 (06 May 2025 19:57)  T(F): 100.4 (07 May 2025 07:45), Max: 100.5 (06 May 2025 19:57)  HR: 91 (07 May 2025 07:45) (68 - 100)  BP: 116/81 (07 May 2025 07:45) (111/70 - 127/75)  BP(mean): --  RR: 18 (07 May 2025 07:45) (18 - 18)  SpO2: 97% (07 May 2025 07:45) (93% - 97%)    Parameters below as of 07 May 2025 07:45  Patient On (Oxygen Delivery Method): room air        PHYSICAL EXAM:  General: [x ] non-toxic  HEAD/EYES: [ ] PERRL [x ] white sclera [ ] icterus  ENT:  [ ] normal [x ] supple [ ] thrush [ ] pharyngeal exudate  Cardiovascular:   [ ] murmur [x ] normal [ ] PPM/AICD  Respiratory:  [ x] clear to ausculation bilaterally  GI:  [x ] soft, non-tender, normal bowel sounds  :  [ ] kent [ x] no CVA tenderness   Musculoskeletal:  [ ] no synovitis  Neurologic:  [ ] non-focal exam   Skin:  [ ] no change  Lymph: [x ] no lymphadenopathy  Psychiatric:  [ ] appropriate affect [ x] alert & oriented  Lines:  [x ] no phlebitis [ ] central line                                14.9   7.20  )-----------( 289      ( 07 May 2025 07:09 )             44.8       05-07    130[L]  |  100  |  17  ----------------------------<  95  3.9   |  16[L]  |  1.09    Ca    9.1      07 May 2025 07:09  Phos  2.9     05-06  Mg     2.4     05-06    TPro  8.6[H]  /  Alb  3.8  /  TBili  0.7  /  DBili  x   /  AST  27  /  ALT  37  /  AlkPhos  76  05-06      Urinalysis Basic - ( 07 May 2025 07:09 )    Color: x / Appearance: x / SG: x / pH: x  Gluc: 95 mg/dL / Ketone: x  / Bili: x / Urobili: x   Blood: x / Protein: x / Nitrite: x   Leuk Esterase: x / RBC: x / WBC x   Sq Epi: x / Non Sq Epi: x / Bacteria: x        MICROBIOLOGY:Culture Results:   No growth (05-06-25 @ 03:45)  Culture Results:   No growth at 24 hours (05-05-25 @ 18:37)  Culture Results:   No growth at 24 hours (05-05-25 @ 18:00)      RADIOLOGY:

## 2025-05-07 NOTE — CONSULT NOTE ADULT - SUBJECTIVE AND OBJECTIVE BOX
HPI:  54M pmhx HTN, HLD, sternal abcess s/p I&D and wound vac found to have meliodosis on wound culture s/p meropenem and Bactrim eradication therapy presented w/ palpitations and SOB x4 days. He initially noticed generalized fatigue about 1 week ago, then he started noticing hand and foot dark lesions about 4 days ago. He tried to have the foot lesions treated at a pedicure. Patient was in [place] x3 months and returned 4 days ago. [place], he saw a doctor there who prescribed an antibiotic and an "antiinflammatory" medication. He then developed palpitations and SOB, which was particularly worsened over the past few days. Over this time, he has also been having night sweats and fevers. He has been taking Tylenol and ibuprofen. He was then sent in by his PMD given his concern for meliodosis    He also reports daily alcohol use consisting of 3 "tall" beers per day, which he stopped about 3-4 days ago. Denies history of alcohol withdrawal or ICU for alcohol withdrawal. Denies current AV hallucinations, tremors.    ED course: ID consult --> doxy 100 x1, meropenem 1000mg x1. 1L LR (06 May 2025 00:23)    Derm HPI:  55 yo M PMH prior meliodosis associated with sternal abscess in 2023 who presented with skin lesions on hands and feet for past 10 days. Notable recent travel to [facility]. Pt denies itch or skin pain. Endorses subjective fevers at home, fatigue. Pt denies illicit substance use.     PAST MEDICAL & SURGICAL HISTORY:  HTN (hypertension)      Hyperlipidemia      2019 novel coronavirus disease (COVID-19)      Tuberculosis      Melioidosis      Abscess of chest wall          REVIEW OF SYSTEMS      General: see HPI    Skin/Breast: see HPI  	  Ophthalmologic: no eye pain or change in vision  	  ENMT: no dysphagia or change in hearing    Respiratory and Thorax: no SOB or cough  	  Cardiovascular: no palpitations or chest pain    Gastrointestinal: no abdominal pain or blood in stool     Genitourinary: no dysuria or frequency    Musculoskeletal: no joint pains or weakness	    Neurological: no weakness, numbness , or tingling    MEDICATIONS  (STANDING):  atorvastatin 40 milliGRAM(s) Oral at bedtime  cefTRIAXone   IVPB 1000 milliGRAM(s) IV Intermittent every 24 hours  doxycycline IVPB 100 milliGRAM(s) IV Intermittent every 12 hours  folic acid 1 milliGRAM(s) Oral daily  multivitamin 1 Tablet(s) Oral daily  thiamine 100 milliGRAM(s) Oral daily    ALLERGIES: No Known Allergies      Social History:  Daily alcohol use consisting of 3 "tall" beers per day. Last drink 4 days ago  Occasional cigarette use (1 cigarette on social occasions)    FAMILY HISTORY:  FH: CAD (coronary artery disease) (Father, Mother)          VITAL SIGNS LAST 24 HOURS:  T(F): 100.9 (05-07 @ 20:00), Max: 100.9 (05-07 @ 20:00)  HR: 95 (05-07 @ 20:00) (84 - 100)  BP: 115/78 (05-07 @ 20:00) (115/77 - 124/78)  RR: 18 (05-07 @ 20:00) (18 - 18)    ___________________________________  Physical Exam:     The patient was alert and in no apparent distress.  OP showed no ulcerations.  There was no visible lymphadenopathy.  Conjunctiva were non-injected.  There was no clubbing or edema of extremities.  The scalp, hair, face, eyebrows, lips, OP, neck, chest, back, extremities X 4, nails were examined.  There was no hyperhidrosis or bromhidrosis.    Of note on skin exam:   - Multiple small hyperpigmented and hemorrhagic papulonodules, some with collarettes of scale, as well as heme-crusted erosions on hands and feet  ____________________________________    LABS:                        14.9   7.20  )-----------( 289      ( 07 May 2025 07:09 )             44.8     05-07    130[L]  |  100  |  17  ----------------------------<  95  3.9   |  16[L]  |  1.09    Ca    9.1      07 May 2025 07:09  Phos  2.9     05-06  Mg     2.4     05-06    TPro  8.6[H]  /  Alb  3.8  /  TBili  0.7  /  DBili  x   /  AST  27  /  ALT  37  /  AlkPhos  76  05-06      Urinalysis Basic - ( 07 May 2025 07:09 )    Color: x / Appearance: x / SG: x / pH: x  Gluc: 95 mg/dL / Ketone: x  / Bili: x / Urobili: x   Blood: x / Protein: x / Nitrite: x   Leuk Esterase: x / RBC: x / WBC x   Sq Epi: x / Non Sq Epi: x / Bacteria: x     HPI:  54M pmhx HTN, HLD, sternal abcess s/p I&D and wound vac found to have melioidoses on wound culture s/p meropenem and Bactrim eradication therapy presented w/ palpitations and SOB x4 days. He initially noticed generalized fatigue about 1 week ago, then he started noticing hand and foot dark lesions about 4 days ago. He tried to have the foot lesions treated at a pedicure. Patient was in [place] x3 months and returned 4 days ago. [place], [place] doctor there who prescribed an antibiotic and an "antiinflammatory" medication. He then developed palpitations and SOB, which was particularly worsened over the past few days. Over this time, he has also been having night sweats and fevers. He has been taking Tylenol and ibuprofen. He was then sent in by his PMD given his concern for meliodosis    He also reports daily alcohol use consisting of 3 "tall" beers per day, which he stopped about 3-4 days ago. Denies history of alcohol withdrawal or ICU for alcohol withdrawal. Denies current AV hallucinations, tremors.    ED course: ID consult --> doxy 100 x1, meropenem 1000mg x1. 1L LR (06 May 2025 00:23)    Derm HPI:  55 yo M PMH prior meliodosis associated with sternal abscess in 2023 who presented with skin lesions on hands and feet for past 10 days. Notable recent travel to [facility]. Pt denies itch or skin pain. Endorses subjective fevers at home, fatigue. Pt denies illicit substance use.     PAST MEDICAL & SURGICAL HISTORY:  HTN (hypertension)      Hyperlipidemia      2019 novel coronavirus disease (COVID-19)      Tuberculosis      Melioidosis      Abscess of chest wall          REVIEW OF SYSTEMS      General: see HPI    Skin/Breast: see HPI  	  Ophthalmologic: no eye pain or change in vision  	  ENMT: no dysphagia or change in hearing    Respiratory and Thorax: no SOB or cough  	  Cardiovascular: no palpitations or chest pain    Gastrointestinal: no abdominal pain or blood in stool     Genitourinary: no dysuria or frequency    Musculoskeletal: no joint pains or weakness	    Neurological: no weakness, numbness , or tingling    MEDICATIONS  (STANDING):  atorvastatin 40 milliGRAM(s) Oral at bedtime  cefTRIAXone   IVPB 1000 milliGRAM(s) IV Intermittent every 24 hours  doxycycline IVPB 100 milliGRAM(s) IV Intermittent every 12 hours  folic acid 1 milliGRAM(s) Oral daily  multivitamin 1 Tablet(s) Oral daily  thiamine 100 milliGRAM(s) Oral daily    ALLERGIES: No Known Allergies      Social History:  Daily alcohol use consisting of 3 "tall" beers per day. Last drink 4 days ago  Occasional cigarette use (1 cigarette on social occasions)    FAMILY HISTORY:  FH: CAD (coronary artery disease) (Father, Mother)          VITAL SIGNS LAST 24 HOURS:  T(F): 100.9 (05-07 @ 20:00), Max: 100.9 (05-07 @ 20:00)  HR: 95 (05-07 @ 20:00) (84 - 100)  BP: 115/78 (05-07 @ 20:00) (115/77 - 124/78)  RR: 18 (05-07 @ 20:00) (18 - 18)    ___________________________________  Physical Exam:     The patient was alert and in no apparent distress.  OP showed no ulcerations.  There was no visible lymphadenopathy.  Conjunctiva were non-injected.  There was no clubbing or edema of extremities.  The scalp, hair, face, eyebrows, lips, OP, neck, chest, back, extremities X 4, nails were examined.  There was no hyperhidrosis or bromhidrosis.    Of note on skin exam:   - Multiple small hyperpigmented and hemorrhagic papulonodules, some with collarettes of scale, as well as heme-crusted erosions on hands and feet  ____________________________________    LABS:                        14.9   7.20  )-----------( 289      ( 07 May 2025 07:09 )             44.8     05-07    130[L]  |  100  |  17  ----------------------------<  95  3.9   |  16[L]  |  1.09    Ca    9.1      07 May 2025 07:09  Phos  2.9     05-06  Mg     2.4     05-06    TPro  8.6[H]  /  Alb  3.8  /  TBili  0.7  /  DBili  x   /  AST  27  /  ALT  37  /  AlkPhos  76  05-06      Urinalysis Basic - ( 07 May 2025 07:09 )    Color: x / Appearance: x / SG: x / pH: x  Gluc: 95 mg/dL / Ketone: x  / Bili: x / Urobili: x   Blood: x / Protein: x / Nitrite: x   Leuk Esterase: x / RBC: x / WBC x   Sq Epi: x / Non Sq Epi: x / Bacteria: x

## 2025-05-08 LAB
ANION GAP SERPL CALC-SCNC: 17 MMOL/L — SIGNIFICANT CHANGE UP (ref 5–17)
AUTO DIFF PNL BLD: ABNORMAL
BASOPHILS # BLD AUTO: 0.04 K/UL — SIGNIFICANT CHANGE UP (ref 0–0.2)
BASOPHILS NFR BLD AUTO: 0.5 % — SIGNIFICANT CHANGE UP (ref 0–2)
BUN SERPL-MCNC: 15 MG/DL — SIGNIFICANT CHANGE UP (ref 7–23)
C-ANCA SER-ACNC: NEGATIVE — SIGNIFICANT CHANGE UP
CALCIUM SERPL-MCNC: 9.4 MG/DL — SIGNIFICANT CHANGE UP (ref 8.4–10.5)
CHLORIDE SERPL-SCNC: 99 MMOL/L — SIGNIFICANT CHANGE UP (ref 96–108)
CO2 SERPL-SCNC: 16 MMOL/L — LOW (ref 22–31)
CORTIS AM PEAK SERPL-MCNC: 29.1 UG/DL — HIGH (ref 6–18.4)
CREAT SERPL-MCNC: 0.99 MG/DL — SIGNIFICANT CHANGE UP (ref 0.5–1.3)
CULTURE RESULTS: SIGNIFICANT CHANGE UP
EGFR: 92 ML/MIN/1.73M2 — SIGNIFICANT CHANGE UP
EGFR: 92 ML/MIN/1.73M2 — SIGNIFICANT CHANGE UP
EOSINOPHIL # BLD AUTO: 0.07 K/UL — SIGNIFICANT CHANGE UP (ref 0–0.5)
EOSINOPHIL NFR BLD AUTO: 0.9 % — SIGNIFICANT CHANGE UP (ref 0–6)
GLUCOSE SERPL-MCNC: 91 MG/DL — SIGNIFICANT CHANGE UP (ref 70–99)
GRAM STN FLD: SIGNIFICANT CHANGE UP
HCT VFR BLD CALC: 44.1 % — SIGNIFICANT CHANGE UP (ref 39–50)
HGB BLD-MCNC: 15.2 G/DL — SIGNIFICANT CHANGE UP (ref 13–17)
IMM GRANULOCYTES NFR BLD AUTO: 0.5 % — SIGNIFICANT CHANGE UP (ref 0–0.9)
LEISHMANIA AB SER QL: NEGATIVE — SIGNIFICANT CHANGE UP
LEPTOSPIRA AB TITR SER: NEGATIVE — SIGNIFICANT CHANGE UP
LYMPHOCYTES # BLD AUTO: 2.03 K/UL — SIGNIFICANT CHANGE UP (ref 1–3.3)
LYMPHOCYTES # BLD AUTO: 25.9 % — SIGNIFICANT CHANGE UP (ref 13–44)
MCHC RBC-ENTMCNC: 27.9 PG — SIGNIFICANT CHANGE UP (ref 27–34)
MCHC RBC-ENTMCNC: 34.5 G/DL — SIGNIFICANT CHANGE UP (ref 32–36)
MCV RBC AUTO: 81.1 FL — SIGNIFICANT CHANGE UP (ref 80–100)
MONOCYTES # BLD AUTO: 0.65 K/UL — SIGNIFICANT CHANGE UP (ref 0–0.9)
MONOCYTES NFR BLD AUTO: 8.3 % — SIGNIFICANT CHANGE UP (ref 2–14)
MPO AB + PR3 PNL SER: SIGNIFICANT CHANGE UP
NEUTROPHILS # BLD AUTO: 5.01 K/UL — SIGNIFICANT CHANGE UP (ref 1.8–7.4)
NEUTROPHILS NFR BLD AUTO: 63.9 % — SIGNIFICANT CHANGE UP (ref 43–77)
NIGHT BLUE STAIN TISS: SIGNIFICANT CHANGE UP
NRBC BLD AUTO-RTO: 0 /100 WBCS — SIGNIFICANT CHANGE UP (ref 0–0)
P-ANCA SER-ACNC: NEGATIVE — SIGNIFICANT CHANGE UP
PLATELET # BLD AUTO: 330 K/UL — SIGNIFICANT CHANGE UP (ref 150–400)
POTASSIUM SERPL-MCNC: 3.9 MMOL/L — SIGNIFICANT CHANGE UP (ref 3.5–5.3)
POTASSIUM SERPL-SCNC: 3.9 MMOL/L — SIGNIFICANT CHANGE UP (ref 3.5–5.3)
RBC # BLD: 5.44 M/UL — SIGNIFICANT CHANGE UP (ref 4.2–5.8)
RBC # FLD: 13.1 % — SIGNIFICANT CHANGE UP (ref 10.3–14.5)
SODIUM SERPL-SCNC: 132 MMOL/L — LOW (ref 135–145)
SPECIMEN SOURCE: SIGNIFICANT CHANGE UP
TSH SERPL-MCNC: 2.44 UIU/ML — SIGNIFICANT CHANGE UP (ref 0.27–4.2)
WBC # BLD: 7.84 K/UL — SIGNIFICANT CHANGE UP (ref 3.8–10.5)
WBC # FLD AUTO: 7.84 K/UL — SIGNIFICANT CHANGE UP (ref 3.8–10.5)

## 2025-05-08 PROCEDURE — 99232 SBSQ HOSP IP/OBS MODERATE 35: CPT

## 2025-05-08 PROCEDURE — 99232 SBSQ HOSP IP/OBS MODERATE 35: CPT | Mod: GC

## 2025-05-08 RX ADMIN — ATORVASTATIN CALCIUM 40 MILLIGRAM(S): 80 TABLET, FILM COATED ORAL at 21:10

## 2025-05-08 RX ADMIN — FOLIC ACID 1 MILLIGRAM(S): 1 TABLET ORAL at 11:52

## 2025-05-08 RX ADMIN — Medication 100 MILLIGRAM(S): at 10:31

## 2025-05-08 RX ADMIN — Medication 1 TABLET(S): at 11:52

## 2025-05-08 RX ADMIN — Medication 100 MILLIGRAM(S): at 21:09

## 2025-05-08 RX ADMIN — Medication 100 MILLIGRAM(S): at 11:53

## 2025-05-08 RX ADMIN — CEFTRIAXONE 100 MILLIGRAM(S): 500 INJECTION, POWDER, FOR SOLUTION INTRAMUSCULAR; INTRAVENOUS at 16:19

## 2025-05-08 NOTE — PROGRESS NOTE ADULT - ASSESSMENT
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    1) New skin lesions to hands/ feet  He has a history of melioidoses  These lesions seem atypical for melioidoses    TTE without vegetation GC/chalmydia negative  Syphillis screen negative  RMSF serology negative     High inflammatory markers    Follow up derm biopsy results    2) Chest pain   Location of prior infection where melioidoses was diagnosed looks good  Imaging without focus of infection in the chest    3) Sore throat  throuat culture negative gas    Continue doxycyline and ceftriaxone    Case dw primary team

## 2025-05-08 NOTE — PROGRESS NOTE ADULT - PROBLEM SELECTOR PLAN 5
#RESOLVED   Baseline SCr 0.9-1.0. SCr 2.12 on admission, currently downtrending to 1..09. DDx: prerenal vs ATN vs ibuprofen. Urine osmolality 669 and Na 73.     -Avoid nephrotoxics   -Monitor BUN/Cr   -Encourage po intake

## 2025-05-08 NOTE — PROGRESS NOTE ADULT - PROBLEM SELECTOR PLAN 6
Euvolvemic hyponatremia.     -Studies consistent with SIADH   - s/p 1L LR in ED  -F/u TSH and cortisol AM

## 2025-05-08 NOTE — PROGRESS NOTE ADULT - PROBLEM SELECTOR PLAN 2
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 Recent travel exposure.     -C/w abx as above  -urine cx, and blood cx 5/5 NGTD

## 2025-05-08 NOTE — PROGRESS NOTE ADULT - SUBJECTIVE AND OBJECTIVE BOX
DATE OF SERVICE: 05-08-25 @ 07:05    Patient is a 54y old  Male who presents with a chief complaint of chest pain, fevers (07 May 2025 21:41)      SUBJECTIVE / OVERNIGHT EVENTS: Overnight febrile to 100.9, otherwise no acute events. Blood culture remain without growth, RMSF negative. Dermatology performed punch biopsy x2 to aid in diagnosis.     MEDICATIONS  (STANDING):  atorvastatin 40 milliGRAM(s) Oral at bedtime  cefTRIAXone   IVPB 1000 milliGRAM(s) IV Intermittent every 24 hours  doxycycline IVPB 100 milliGRAM(s) IV Intermittent every 12 hours  folic acid 1 milliGRAM(s) Oral daily  multivitamin 1 Tablet(s) Oral daily  thiamine 100 milliGRAM(s) Oral daily    MEDICATIONS  (PRN):  acetaminophen     Tablet .. 650 milliGRAM(s) Oral every 6 hours PRN Temp greater or equal to 38C (100.4F), Mild Pain (1 - 3)  benzocaine/menthol Lozenge 1 Lozenge Oral three times a day PRN Sore Throat  melatonin 3 milliGRAM(s) Oral at bedtime PRN Insomnia  ondansetron Injectable 4 milliGRAM(s) IV Push every 8 hours PRN Nausea and/or Vomiting      Vital Signs Last 24 Hrs  T(C): 36.9 (08 May 2025 04:50), Max: 38.3 (07 May 2025 20:00)  T(F): 98.4 (08 May 2025 04:50), Max: 100.9 (07 May 2025 20:00)  HR: 93 (08 May 2025 04:50) (84 - 96)  BP: 121/81 (08 May 2025 04:50) (106/69 - 124/78)  BP(mean): --  RR: 18 (08 May 2025 04:50) (18 - 18)  SpO2: 97% (08 May 2025 04:50) (94% - 98%)    Parameters below as of 08 May 2025 04:50  Patient On (Oxygen Delivery Method): room air      CAPILLARY BLOOD GLUCOSE      POCT Blood Glucose.: 90 mg/dL (07 May 2025 12:57)    I&O's Summary    07 May 2025 07:01  -  08 May 2025 07:00  --------------------------------------------------------  IN: 480 mL / OUT: 450 mL / NET: 30 mL        PHYSICAL EXAM:  General: NAD, calm, cooperative, overweight habitus   Neuro: AAOx3, 5/5  strength b/l, 5/5 hip flexion/extension, spontaneity, no tongue fascitulations, no asterixis   HEENT: NC/AT, PERRLA b/l, EOMI, nonconjunctival pallor, erythema and edematous tonsils with 1 L sided tonsil stone  Chest: old scar in upper sternum with divet 0.5 cm from old I&D   Heart: S1/S2, RRR   Lungs: CTA b/l, nonlabored breathing   Abd: soft, nonTTP, nondistended   Ext: 1 cm firm dark blood collection lateral aspect of L 4th finger and 3rd middle digit of R hand, scatterd 3-4 simialr lesions on lateral sole aspect of feet b/l   Back: nonTTP     LABS:                        14.9   7.20  )-----------( 289      ( 07 May 2025 07:09 )             44.8     05-07    130[L]  |  100  |  17  ----------------------------<  95  3.9   |  16[L]  |  1.09    Ca    9.1      07 May 2025 07:09  Phos  2.9     05-06  Mg     2.4     05-06    TPro  8.6[H]  /  Alb  3.8  /  TBili  0.7  /  DBili  x   /  AST  27  /  ALT  37  /  AlkPhos  76  05-06          Urinalysis Basic - ( 07 May 2025 07:09 )    Color: x / Appearance: x / SG: x / pH: x  Gluc: 95 mg/dL / Ketone: x  / Bili: x / Urobili: x   Blood: x / Protein: x / Nitrite: x   Leuk Esterase: x / RBC: x / WBC x   Sq Epi: x / Non Sq Epi: x / Bacteria: x        RADIOLOGY & ADDITIONAL TESTS:    Imaging Personally Reviewed:    Consultant(s) Notes Reviewed:      Care Discussed with Consultants/Other Providers:

## 2025-05-08 NOTE — PROGRESS NOTE ADULT - PROBLEM SELECTOR PLAN 1
Reported fever at home with recurrent fever; arrived with leukocytosis WBC 12 and downtrending. Intermittent tachycardia. Recent travel exposure, multiple sexual partners with reported condom use, and development of nonspecific symptoms and dark lesions on fingers/hands. Tonsillitis with 1 pustule vs stone. DDx: meliodosis vs STI vs other zoonotic infection vs less likely rheumatic fever     -Appreciate ID recommendations   -5/5 blood and urine cx NGTD   -C/w doxycycline 100 mg IV q12 (started 5/6)   -C/w meropenem 1000 mg IV q8 (started 5/6) -> de-escalated to CTX (5/7 -)  -F/u throat cx to r/o strep throat   -Repeat 5/8 blood cx AM

## 2025-05-08 NOTE — PROGRESS NOTE ADULT - PROBLEM SELECTOR PLAN 4
No hx alcohol withdrawal. Arrived with palpitations, anxiety. No fasciculations or asterixis.     -No CIWA

## 2025-05-08 NOTE — PROGRESS NOTE ADULT - SUBJECTIVE AND OBJECTIVE BOX
Follow Up:      Inverval History/ROS:Patient is a 54y old  Male who presents with a chief complaint of chest pain, fevers (08 May 2025 07:05)    Allergies    No Known Allergies    Intolerances    + fever  CHest pain/ sob resolved      ANTIMICROBIALS:  cefTRIAXone   IVPB 1000 every 24 hours  doxycycline IVPB 100 every 12 hours      OTHER MEDS:  acetaminophen     Tablet .. 650 milliGRAM(s) Oral every 6 hours PRN  atorvastatin 40 milliGRAM(s) Oral at bedtime  benzocaine/menthol Lozenge 1 Lozenge Oral three times a day PRN  folic acid 1 milliGRAM(s) Oral daily  melatonin 3 milliGRAM(s) Oral at bedtime PRN  multivitamin 1 Tablet(s) Oral daily  ondansetron Injectable 4 milliGRAM(s) IV Push every 8 hours PRN  thiamine 100 milliGRAM(s) Oral daily      Vital Signs Last 24 Hrs  T(C): 36.9 (08 May 2025 16:07), Max: 38.3 (07 May 2025 20:00)  T(F): 98.4 (08 May 2025 16:07), Max: 100.9 (07 May 2025 20:00)  HR: 93 (08 May 2025 16:07) (84 - 95)  BP: 126/85 (08 May 2025 16:07) (106/69 - 128/80)  BP(mean): --  RR: 18 (08 May 2025 16:07) (18 - 18)  SpO2: 98% (08 May 2025 16:07) (95% - 98%)    Parameters below as of 08 May 2025 16:07  Patient On (Oxygen Delivery Method): room air        PHYSICAL EXAM:  General: [ x] non-toxic  HEAD/EYES: [ ] PERRL [x ] white sclera [ ] icterus  ENT:  [ ] normal [x ] supple [ ] thrush [ ] pharyngeal exudate  Cardiovascular:   [ ] murmur [x ] normal [ ] PPM/AICD  Respiratory:  [x ] clear to ausculation bilaterally  GI:  [ x] soft, non-tender, normal bowel sounds  :  [ ] kent [x ] no CVA tenderness   Musculoskeletal:  [ ] no synovitis  Neurologic:  [ ] non-focal exam   Skin:  [x ] no rash  Lymph: [ ] no lymphadenopathy  Psychiatric:  [ ] appropriate affect [ ] alert & oriented  Lines:  [x ] no phlebitis [ ] central line                                15.2   7.84  )-----------( 330      ( 08 May 2025 07:18 )             44.1       05-08    132[L]  |  99  |  15  ----------------------------<  91  3.9   |  16[L]  |  0.99    Ca    9.4      08 May 2025 07:18        Urinalysis Basic - ( 08 May 2025 07:18 )    Color: x / Appearance: x / SG: x / pH: x  Gluc: 91 mg/dL / Ketone: x  / Bili: x / Urobili: x   Blood: x / Protein: x / Nitrite: x   Leuk Esterase: x / RBC: x / WBC x   Sq Epi: x / Non Sq Epi: x / Bacteria: x        MICROBIOLOGY:Culture Results:   Testing in progress (05-07-25 @ 18:57)  Culture Results:   No growth (05-06-25 @ 03:45)  Culture Results:   No growth at 48 Hours (05-05-25 @ 18:37)  Culture Results:   No growth at 48 Hours (05-05-25 @ 18:00)      RADIOLOGY:

## 2025-05-08 NOTE — PROGRESS NOTE ADULT - PROBLEM SELECTOR PLAN 3
Scattered lesions on fingers/lateral soles of feet. Unclear etiology. Recent splinter and mosuiqto exposure. no recent sexual activity with wife. Recent chemical/water party exposure to feet from salon, but not to hands. DDx STI vs zoonotic infection vs rheumatic fever     -Monitor   -Appreciate ID recommendations   -Appreciate dermatology recommendations, pending punch biopsy results   -F/u brucella IgG/IgM, leishmaniasis Ab, leptospiorosis Ab , timothy mountain spotted fever IgG/IgM, schihistoma Ab IgG,  -F/u ANCA and ESR/CRP   -TTE showing LVEF 59% without RWA nor other valve disease   -HIV and RPR negative   -Pending urine chlymydia/gonorrhea

## 2025-05-09 LAB
ALBUMIN SERPL ELPH-MCNC: 3.6 G/DL — SIGNIFICANT CHANGE UP (ref 3.3–5)
ALP SERPL-CCNC: 90 U/L — SIGNIFICANT CHANGE UP (ref 40–120)
ALT FLD-CCNC: 101 U/L — HIGH (ref 10–45)
ANION GAP SERPL CALC-SCNC: 14 MMOL/L — SIGNIFICANT CHANGE UP (ref 5–17)
AST SERPL-CCNC: 57 U/L — HIGH (ref 10–40)
B ABORTUS IGG SER-ACNC: NEGATIVE — SIGNIFICANT CHANGE UP
BASOPHILS # BLD AUTO: 0 K/UL — SIGNIFICANT CHANGE UP (ref 0–0.2)
BASOPHILS NFR BLD AUTO: 0 % — SIGNIFICANT CHANGE UP (ref 0–2)
BILIRUB SERPL-MCNC: 0.6 MG/DL — SIGNIFICANT CHANGE UP (ref 0.2–1.2)
BRUCELLA IGM SER-ACNC: NEGATIVE — SIGNIFICANT CHANGE UP
BUN SERPL-MCNC: 18 MG/DL — SIGNIFICANT CHANGE UP (ref 7–23)
C TRACH RRNA SPEC QL NAA+PROBE: SIGNIFICANT CHANGE UP
C TRACH+GC RRNA SPEC QL PROBE: SIGNIFICANT CHANGE UP
CALCIUM SERPL-MCNC: 9.3 MG/DL — SIGNIFICANT CHANGE UP (ref 8.4–10.5)
CHLORIDE SERPL-SCNC: 101 MMOL/L — SIGNIFICANT CHANGE UP (ref 96–108)
CK SERPL-CCNC: 75 U/L — SIGNIFICANT CHANGE UP (ref 30–200)
CO2 SERPL-SCNC: 18 MMOL/L — LOW (ref 22–31)
CREAT SERPL-MCNC: 1.04 MG/DL — SIGNIFICANT CHANGE UP (ref 0.5–1.3)
EGFR: 86 ML/MIN/1.73M2 — SIGNIFICANT CHANGE UP
EGFR: 86 ML/MIN/1.73M2 — SIGNIFICANT CHANGE UP
EOSINOPHIL # BLD AUTO: 0.13 K/UL — SIGNIFICANT CHANGE UP (ref 0–0.5)
EOSINOPHIL NFR BLD AUTO: 1.7 % — SIGNIFICANT CHANGE UP (ref 0–6)
GLUCOSE SERPL-MCNC: 90 MG/DL — SIGNIFICANT CHANGE UP (ref 70–99)
HAV IGM SER-ACNC: SIGNIFICANT CHANGE UP
HBV CORE IGM SER-ACNC: SIGNIFICANT CHANGE UP
HBV SURFACE AG SER-ACNC: SIGNIFICANT CHANGE UP
HCT VFR BLD CALC: 45.9 % — SIGNIFICANT CHANGE UP (ref 39–50)
HCV AB S/CO SERPL IA: 0.32 S/CO — SIGNIFICANT CHANGE UP (ref 0–0.79)
HCV AB SERPL-IMP: SIGNIFICANT CHANGE UP
HGB BLD-MCNC: 15.5 G/DL — SIGNIFICANT CHANGE UP (ref 13–17)
LDH SERPL L TO P-CCNC: 229 U/L — SIGNIFICANT CHANGE UP (ref 50–242)
LYMPHOCYTES # BLD AUTO: 1.79 K/UL — SIGNIFICANT CHANGE UP (ref 1–3.3)
LYMPHOCYTES # BLD AUTO: 22.6 % — SIGNIFICANT CHANGE UP (ref 13–44)
MAGNESIUM SERPL-MCNC: 2.2 MG/DL — SIGNIFICANT CHANGE UP (ref 1.6–2.6)
MANUAL SMEAR VERIFICATION: SIGNIFICANT CHANGE UP
MCHC RBC-ENTMCNC: 27.3 PG — SIGNIFICANT CHANGE UP (ref 27–34)
MCHC RBC-ENTMCNC: 33.8 G/DL — SIGNIFICANT CHANGE UP (ref 32–36)
MCV RBC AUTO: 81 FL — SIGNIFICANT CHANGE UP (ref 80–100)
METAMYELOCYTES # FLD: 0.9 % — HIGH (ref 0–0)
METAMYELOCYTES NFR BLD: 0.9 % — HIGH (ref 0–0)
MONOCYTES # BLD AUTO: 0.82 K/UL — SIGNIFICANT CHANGE UP (ref 0–0.9)
MONOCYTES NFR BLD AUTO: 10.4 % — SIGNIFICANT CHANGE UP (ref 2–14)
N GONORRHOEA RRNA SPEC QL NAA+PROBE: DETECTED
NEUTROPHILS # BLD AUTO: 5.09 K/UL — SIGNIFICANT CHANGE UP (ref 1.8–7.4)
NEUTROPHILS NFR BLD AUTO: 61.8 % — SIGNIFICANT CHANGE UP (ref 43–77)
NEUTS BAND # BLD: 2.6 % — SIGNIFICANT CHANGE UP (ref 0–8)
NEUTS BAND NFR BLD: 2.6 % — SIGNIFICANT CHANGE UP (ref 0–8)
PHOSPHATE SERPL-MCNC: 3.1 MG/DL — SIGNIFICANT CHANGE UP (ref 2.5–4.5)
PLAT MORPH BLD: NORMAL — SIGNIFICANT CHANGE UP
PLATELET # BLD AUTO: 310 K/UL — SIGNIFICANT CHANGE UP (ref 150–400)
POTASSIUM SERPL-MCNC: 3.9 MMOL/L — SIGNIFICANT CHANGE UP (ref 3.5–5.3)
POTASSIUM SERPL-SCNC: 3.9 MMOL/L — SIGNIFICANT CHANGE UP (ref 3.5–5.3)
PROT SERPL-MCNC: 8.6 G/DL — HIGH (ref 6–8.3)
RBC # BLD: 5.67 M/UL — SIGNIFICANT CHANGE UP (ref 4.2–5.8)
RBC # FLD: 12.9 % — SIGNIFICANT CHANGE UP (ref 10.3–14.5)
RBC BLD AUTO: NORMAL — SIGNIFICANT CHANGE UP
RHEUMATOID FACT SERPL-ACNC: <10 IU/ML — SIGNIFICANT CHANGE UP (ref 0–13)
SODIUM SERPL-SCNC: 133 MMOL/L — LOW (ref 135–145)
WBC # BLD: 7.9 K/UL — SIGNIFICANT CHANGE UP (ref 3.8–10.5)
WBC # FLD AUTO: 7.9 K/UL — SIGNIFICANT CHANGE UP (ref 3.8–10.5)

## 2025-05-09 PROCEDURE — 99232 SBSQ HOSP IP/OBS MODERATE 35: CPT | Mod: GC

## 2025-05-09 PROCEDURE — 99232 SBSQ HOSP IP/OBS MODERATE 35: CPT

## 2025-05-09 RX ORDER — ATORVASTATIN CALCIUM 80 MG/1
20 TABLET, FILM COATED ORAL AT BEDTIME
Refills: 0 | Status: DISCONTINUED | OUTPATIENT
Start: 2025-05-09 | End: 2025-05-13

## 2025-05-09 RX ORDER — CEFTRIAXONE 500 MG/1
2000 INJECTION, POWDER, FOR SOLUTION INTRAMUSCULAR; INTRAVENOUS EVERY 24 HOURS
Refills: 0 | Status: DISCONTINUED | OUTPATIENT
Start: 2025-05-09 | End: 2025-05-13

## 2025-05-09 RX ORDER — SODIUM CHLORIDE 9 G/1000ML
500 INJECTION, SOLUTION INTRAVENOUS
Refills: 0 | Status: DISCONTINUED | OUTPATIENT
Start: 2025-05-09 | End: 2025-05-13

## 2025-05-09 RX ADMIN — Medication 100 MILLIGRAM(S): at 11:14

## 2025-05-09 RX ADMIN — FOLIC ACID 1 MILLIGRAM(S): 1 TABLET ORAL at 11:14

## 2025-05-09 RX ADMIN — ATORVASTATIN CALCIUM 20 MILLIGRAM(S): 80 TABLET, FILM COATED ORAL at 21:04

## 2025-05-09 RX ADMIN — Medication 650 MILLIGRAM(S): at 00:50

## 2025-05-09 RX ADMIN — CEFTRIAXONE 100 MILLIGRAM(S): 500 INJECTION, POWDER, FOR SOLUTION INTRAMUSCULAR; INTRAVENOUS at 21:03

## 2025-05-09 RX ADMIN — Medication 1 TABLET(S): at 11:14

## 2025-05-09 RX ADMIN — Medication 100 MILLIGRAM(S): at 10:06

## 2025-05-09 RX ADMIN — Medication 100 MILLIGRAM(S): at 21:44

## 2025-05-09 RX ADMIN — Medication 1 LOZENGE: at 21:03

## 2025-05-09 RX ADMIN — Medication 650 MILLIGRAM(S): at 00:19

## 2025-05-09 RX ADMIN — SODIUM CHLORIDE 250 MILLILITER(S): 9 INJECTION, SOLUTION INTRAVENOUS at 23:18

## 2025-05-09 NOTE — PROGRESS NOTE ADULT - PROBLEM SELECTOR PLAN 1
Reported fever at home with recurrent fever; arrived with leukocytosis WBC 12 and downtrending.No further fever nor tachycardia.  Recent travel exposure, multiple sexual partners with reported condom use, and development of nonspecific symptoms and dark lesions on fingers/hands. Tonsillitis with 1 pustule vs stone. DDx: meliodosis vs STI vs other zoonotic infection vs less likely rheumatic fever     -Appreciate ID recommendations   -5/5 blood and urine cx NGTD   -C/w doxycycline 100 mg IV q12 (started 5/6)   -C/w meropenem 1000 mg IV q8 (started 5/6) -> de-escalated to CTX (5/7 -)  -F/u throat cx to r/o strep throat   -F/u  5/8 blood cx AM Reported fever at home with recurrent fever; arrived with leukocytosis WBC 12 and downtrending.No further fever nor tachycardia.  Recent travel exposure, multiple sexual partners with reported condom use, and development of nonspecific symptoms and dark lesions on fingers/hands. Tonsillitis with 1 pustule vs stone. DDx: meliodosis vs STI vs other zoonotic infection vs less likely rheumatic fever     -Appreciate ID recommendations   -5/5 blood and urine cx NGTD   -C/w doxycycline 100 mg IV q12 (started 5/6-5/10)   -C/w meropenem 1000 mg IV q8 (started 5/6) -> de-escalated to CTX (5/7 - until 5/9; monitor off abx for recurrence of fever   -Neg throat cx to r/o strep throat   -F/u  5/8 blood cx AM  -Tentative SYMONE on monday 5/12 to r/o blood cx negative infective endocarditis per ID

## 2025-05-09 NOTE — PROGRESS NOTE ADULT - SUBJECTIVE AND OBJECTIVE BOX
INPATIENT MEDICINE PROGRESS NOTE:   Authored by Jasmin Junior MD     HISTORY OF PRESENT ILLNESS:    NAEON. Afebrile, HR 80-s90s. SBPs 110-140s. RA.     Seen and examined at bedside, patient reports intermittent sore throat that is easier to drink fluids than solids. Having gatorade, pudding, water, etc. Otherwise no further fevers, chest pain, palpitations. Having some BM but less due to reduced po intake.     PHYSICAL EXAM:  Vital Signs Last 24 Hrs  T(C): 36.8 (09 May 2025 05:10), Max: 37.1 (08 May 2025 12:03)  T(F): 98.3 (09 May 2025 05:10), Max: 98.7 (08 May 2025 12:03)  HR: 89 (09 May 2025 05:10) (88 - 94)  BP: 121/86 (09 May 2025 05:10) (111/79 - 128/84)  BP(mean): --  RR: 18 (09 May 2025 05:10) (18 - 18)  SpO2: 98% (09 May 2025 05:10) (94% - 98%)    Parameters below as of 09 May 2025 05:10  Patient On (Oxygen Delivery Method): room air        General: NAD, calm, cooperative, overweight habitus   Neuro: AAOx3, 5/5  strength b/l, 5/5 hip flexion/extension, spontaneity, no tongue fascitulations, no asterixis   HEENT: NC/AT, PERRLA b/l, EOMI, nonconjunctival pallor, erythema and edematous tonsils with 1 L sided tonsil stone  Chest: old scar in upper sternum with divet 0.5 cm from old I&D   Heart: S1/S2, RRR   Lungs: CTA b/l, nonlabored breathing   Abd: soft, nonTTP, nondistended   Ext: 1 cm firm dark blood collection lateral aspect of L 4th finger and 3rd middle digit of R hand with wound dressing, scatterd 3-4 simialr lesions on lateral sole aspect of feet b/l   Back: nonTTP   Psych: full range affect, hyperverbal, mutual topic, linear and goal directed thought process    I&O's Summary    08 May 2025 07:01  -  09 May 2025 07:00  --------------------------------------------------------  IN: 780 mL / OUT: 350 mL / NET: 430 mL        LABS:                        15.2   7.84  )-----------( 330      ( 08 May 2025 07:18 )             44.1     05-08    132[L]  |  99  |  15  ----------------------------<  91  3.9   |  16[L]  |  0.99    Ca    9.4      08 May 2025 07:18      CAPILLARY BLOOD GLUCOSE              Urinalysis Basic - ( 08 May 2025 07:18 )    Color: x / Appearance: x / SG: x / pH: x  Gluc: 91 mg/dL / Ketone: x  / Bili: x / Urobili: x   Blood: x / Protein: x / Nitrite: x   Leuk Esterase: x / RBC: x / WBC x   Sq Epi: x / Non Sq Epi: x / Bacteria: x        Culture - Acid Fast - Tissue w/Smear (collected 07 May 2025 18:57)  Source: Tissue Other    Culture - Fungal, Tissue (collected 07 May 2025 18:57)  Source: Tissue  Preliminary Report (08 May 2025 11:38):    Testing in progress    Culture - Tissue with Gram Stain (collected 07 May 2025 18:57)  Source: Tissue Other  Gram Stain (08 May 2025 01:14):    No polymorphonuclear leukocytes seen per low power field    No organisms seen per oil power field  Preliminary Report (08 May 2025 20:22):    No growth to date.    Culture - Group A Streptococcus (collected 07 May 2025 12:23)  Source: Throat  Final Report (08 May 2025 18:30):    No Streptococcus pyogenes (Group A) isolated        MEDICATIONS  (STANDING):  atorvastatin 40 milliGRAM(s) Oral at bedtime  cefTRIAXone   IVPB 1000 milliGRAM(s) IV Intermittent every 24 hours  doxycycline IVPB 100 milliGRAM(s) IV Intermittent every 12 hours  folic acid 1 milliGRAM(s) Oral daily  multivitamin 1 Tablet(s) Oral daily  thiamine 100 milliGRAM(s) Oral daily    MEDICATIONS  (PRN):  acetaminophen     Tablet .. 650 milliGRAM(s) Oral every 6 hours PRN Temp greater or equal to 38C (100.4F), Mild Pain (1 - 3)  benzocaine/menthol Lozenge 1 Lozenge Oral every 2 hours PRN Sore Throat  benzocaine/menthol Lozenge 1 Lozenge Oral three times a day PRN Sore Throat  melatonin 3 milliGRAM(s) Oral at bedtime PRN Insomnia  ondansetron Injectable 4 milliGRAM(s) IV Push every 8 hours PRN Nausea and/or Vomiting

## 2025-05-09 NOTE — PROGRESS NOTE ADULT - PROBLEM SELECTOR PLAN 6
Euvolvemic hyponatremia.     -Studies consistent with SIADH   - s/p 1L LR in ED  -Elevated AM cortisol 29.1  -Non elevated TSH

## 2025-05-09 NOTE — PROGRESS NOTE ADULT - PROBLEM SELECTOR PLAN 8
-C/w atoravastatin  40 mg po qhs Elevated LFTs after uptitration of atoravstatin.     -Downtitrate atoravastatin  40 to 20 mg po qhs

## 2025-05-09 NOTE — PROGRESS NOTE ADULT - ASSESSMENT
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    1) New skin lesions to hands/ feet  He has a history of melioidoses  These lesions seem atypical for melioidoses    TTE without vegetation GC/chalmydia negative  Syphillis screen negative  RMSF serology negative     High inflammatory markers    Follow up derm biopsy results    Stop ceftriaxone  Finish 5 d empiric doxycyline  Check SYMONE    2) Chest pain   Location of prior infection where melioidoses was diagnosed looks good  Imaging without focus of infection in the chest    3) Sore throat  throat culture negative gas    Finish 5 d course of doxycyline    Case dw attending

## 2025-05-09 NOTE — PROGRESS NOTE ADULT - PROBLEM SELECTOR PLAN 9
Code: FULL   DVT ppx: none, IMPROVE score 0   Diet: reg/DASH  Dispo: pending clinical improvement, likely home

## 2025-05-09 NOTE — PROGRESS NOTE ADULT - PROBLEM SELECTOR PLAN 5
#RESOLVED   Baseline SCr 0.9-1.0. SCr 2.12 on admission, currently downtrending to 1.09. DDx: prerenal vs ATN vs ibuprofen. Urine osmolality 669 and Na 73.     -Avoid nephrotoxics   -Monitor BUN/Cr   -Encourage po intake

## 2025-05-09 NOTE — PROGRESS NOTE ADULT - SUBJECTIVE AND OBJECTIVE BOX
Follow Up:      Inverval History/ROS:Patient is a 54y old  Male who presents with a chief complaint of chest pain, fevers (09 May 2025 07:22)    No fever  C/o some residual sore throat.       Allergies    No Known Allergies    Intolerances        ANTIMICROBIALS:  doxycycline IVPB 100 every 12 hours      OTHER MEDS:  acetaminophen     Tablet .. 650 milliGRAM(s) Oral every 6 hours PRN  atorvastatin 20 milliGRAM(s) Oral at bedtime  benzocaine/menthol Lozenge 1 Lozenge Oral every 2 hours PRN  benzocaine/menthol Lozenge 1 Lozenge Oral three times a day PRN  folic acid 1 milliGRAM(s) Oral daily  melatonin 3 milliGRAM(s) Oral at bedtime PRN  multivitamin 1 Tablet(s) Oral daily  ondansetron Injectable 4 milliGRAM(s) IV Push every 8 hours PRN  thiamine 100 milliGRAM(s) Oral daily      Vital Signs Last 24 Hrs  T(C): 36.9 (09 May 2025 11:39), Max: 36.9 (08 May 2025 19:41)  T(F): 98.5 (09 May 2025 11:39), Max: 98.5 (08 May 2025 19:41)  HR: 78 (09 May 2025 11:39) (78 - 94)  BP: 121/78 (09 May 2025 11:39) (111/79 - 128/84)  BP(mean): --  RR: 18 (09 May 2025 11:39) (18 - 18)  SpO2: 99% (09 May 2025 11:39) (94% - 99%)    Parameters below as of 09 May 2025 11:39  Patient On (Oxygen Delivery Method): room air        PHYSICAL EXAM:  General: x[ ] non-toxic  HEAD/EYES: [ ] PERRL x[ ] white sclera [ ] icterus  ENT:  [ ] normal x[ ] supple [ ] thrush [ ] pharyngeal exudate  Cardiovascular:   [ ] murmur [x ] normal [ ] PPM/AICD  Respiratory:  [x ] clear to ausculation bilaterally  GI:  x[ ] soft, non-tender, normal bowel sounds  :  [ ] kent [ x] no CVA tenderness   Musculoskeletal:  [ ] no synovitis  Neurologic:  [ ] non-focal exam   Skin:  [ x] no rash  Lymph: [ x] no lymphadenopathy  Psychiatric:  [ ] appropriate affect [ ] alert & oriented  Lines:  [x ] no phlebitis [ ] central line                                15.5   7.90  )-----------( 310      ( 09 May 2025 07:35 )             45.9       05-09    133[L]  |  101  |  18  ----------------------------<  90  3.9   |  18[L]  |  1.04    Ca    9.3      09 May 2025 07:35  Phos  3.1     05-09  Mg     2.2     05-09    TPro  8.6[H]  /  Alb  3.6  /  TBili  0.6  /  DBili  x   /  AST  57[H]  /  ALT  101[H]  /  AlkPhos  90  05-09      Urinalysis Basic - ( 09 May 2025 07:35 )    Color: x / Appearance: x / SG: x / pH: x  Gluc: 90 mg/dL / Ketone: x  / Bili: x / Urobili: x   Blood: x / Protein: x / Nitrite: x   Leuk Esterase: x / RBC: x / WBC x   Sq Epi: x / Non Sq Epi: x / Bacteria: x        MICROBIOLOGY:Culture Results:   No growth at 24 hours (05-08-25 @ 07:29)  Culture Results:   No growth at 24 hours (05-08-25 @ 07:29)  Culture Results:   No growth (05-07-25 @ 18:57)  Culture Results:   No growth to date. (05-07-25 @ 18:57)  Culture Results:   No Streptococcus pyogenes (Group A) isolated (05-07-25 @ 12:23)  Culture Results:   No growth (05-06-25 @ 03:45)  Culture Results:   No growth at 72 Hours (05-05-25 @ 18:37)  Culture Results:   No growth at 72 Hours (05-05-25 @ 18:00)      RADIOLOGY:

## 2025-05-09 NOTE — PROGRESS NOTE ADULT - ASSESSMENT
54M with history of HTN, HLD, meliodosis, sternal abscess s/p I&D here for subacute multiple symptoms (palpitations, CP, night sweats) and few scattered lesions on fingers and lateral aspect of feet with recent travel exposure and multiple sexual partners in [place] and likely mild alcohol withdrawal; currently remains on empiric abx; currently s/p punch biopsy of finger extremities with tonsillitis 2/2 viral vs bacterial.  54M with history of HTN, HLD, meliodosis, sternal abscess s/p I&D here for subacute multiple symptoms (palpitations, CP, night sweats) and few scattered lesions on fingers and lateral aspect of feet with recent travel exposure and multiple sexual partners in [place] and likely mild alcohol withdrawal; currently remains on empiric abx; currently s/p punch biopsy of finger extremities ddx vasculitits vs blood cx negative infective endocarditis.

## 2025-05-09 NOTE — PROGRESS NOTE ADULT - PROBLEM SELECTOR PLAN 3
Scattered lesions on fingers/lateral soles of feet. Unclear etiology. Recent splinter and mosquito exposure. no recent sexual activity with wife. Recent chemical/water party exposure to feet from salon, but not to hands. DDx STI vs zoonotic infection vs rheumatic fever     -Monitor   -Appreciate ID recommendations   -Appreciate dermatology recommendations, pending punch biopsy results   -F/u brucella IgG/IgM,  schihistoma Ab IgG,  -Negative leishmaniasis Ab, leptospiorosis Ab , timothy mountain spotted fever IgG/IgM   - Indeterminate atypical ANCA   -TTE showing LVEF 59% without RWA nor other valve disease   -HIV and RPR negative   -Negative urine chlymydia/gonorrhea

## 2025-05-10 ENCOUNTER — TRANSCRIPTION ENCOUNTER (OUTPATIENT)
Age: 55
End: 2025-05-10

## 2025-05-10 DIAGNOSIS — A54.9 GONOCOCCAL INFECTION, UNSPECIFIED: ICD-10-CM

## 2025-05-10 LAB
ALBUMIN SERPL ELPH-MCNC: 3.8 G/DL — SIGNIFICANT CHANGE UP (ref 3.3–5)
ALP SERPL-CCNC: 93 U/L — SIGNIFICANT CHANGE UP (ref 40–120)
ALT FLD-CCNC: 93 U/L — HIGH (ref 10–45)
ANION GAP SERPL CALC-SCNC: 16 MMOL/L — SIGNIFICANT CHANGE UP (ref 5–17)
AST SERPL-CCNC: 47 U/L — HIGH (ref 10–40)
BILIRUB SERPL-MCNC: 0.6 MG/DL — SIGNIFICANT CHANGE UP (ref 0.2–1.2)
BUN SERPL-MCNC: 19 MG/DL — SIGNIFICANT CHANGE UP (ref 7–23)
CALCIUM SERPL-MCNC: 9.8 MG/DL — SIGNIFICANT CHANGE UP (ref 8.4–10.5)
CHLORIDE SERPL-SCNC: 98 MMOL/L — SIGNIFICANT CHANGE UP (ref 96–108)
CO2 SERPL-SCNC: 19 MMOL/L — LOW (ref 22–31)
CREAT SERPL-MCNC: 1.14 MG/DL — SIGNIFICANT CHANGE UP (ref 0.5–1.3)
CULTURE RESULTS: SIGNIFICANT CHANGE UP
CULTURE RESULTS: SIGNIFICANT CHANGE UP
EGFR: 77 ML/MIN/1.73M2 — SIGNIFICANT CHANGE UP
EGFR: 77 ML/MIN/1.73M2 — SIGNIFICANT CHANGE UP
GAMMA INTERFERON BACKGROUND BLD IA-ACNC: 0.11 IU/ML — SIGNIFICANT CHANGE UP
GLUCOSE SERPL-MCNC: 82 MG/DL — SIGNIFICANT CHANGE UP (ref 70–99)
HCT VFR BLD CALC: 46.2 % — SIGNIFICANT CHANGE UP (ref 39–50)
HGB BLD-MCNC: 15.3 G/DL — SIGNIFICANT CHANGE UP (ref 13–17)
M TB IFN-G BLD-IMP: NEGATIVE — SIGNIFICANT CHANGE UP
M TB IFN-G CD4+ BCKGRND COR BLD-ACNC: 0 IU/ML — SIGNIFICANT CHANGE UP
M TB IFN-G CD4+CD8+ BCKGRND COR BLD-ACNC: 0.01 IU/ML — SIGNIFICANT CHANGE UP
MCHC RBC-ENTMCNC: 27.3 PG — SIGNIFICANT CHANGE UP (ref 27–34)
MCHC RBC-ENTMCNC: 33.1 G/DL — SIGNIFICANT CHANGE UP (ref 32–36)
MCV RBC AUTO: 82.5 FL — SIGNIFICANT CHANGE UP (ref 80–100)
NRBC BLD AUTO-RTO: 0 /100 WBCS — SIGNIFICANT CHANGE UP (ref 0–0)
PLATELET # BLD AUTO: 394 K/UL — SIGNIFICANT CHANGE UP (ref 150–400)
POTASSIUM SERPL-MCNC: 4.2 MMOL/L — SIGNIFICANT CHANGE UP (ref 3.5–5.3)
POTASSIUM SERPL-SCNC: 4.2 MMOL/L — SIGNIFICANT CHANGE UP (ref 3.5–5.3)
PROT SERPL-MCNC: 9 G/DL — HIGH (ref 6–8.3)
QUANT TB PLUS MITOGEN MINUS NIL: 4.56 IU/ML — SIGNIFICANT CHANGE UP
RBC # BLD: 5.6 M/UL — SIGNIFICANT CHANGE UP (ref 4.2–5.8)
RBC # FLD: 13 % — SIGNIFICANT CHANGE UP (ref 10.3–14.5)
SODIUM SERPL-SCNC: 133 MMOL/L — LOW (ref 135–145)
SPECIMEN SOURCE: SIGNIFICANT CHANGE UP
SPECIMEN SOURCE: SIGNIFICANT CHANGE UP
WBC # BLD: 11.12 K/UL — HIGH (ref 3.8–10.5)
WBC # FLD AUTO: 11.12 K/UL — HIGH (ref 3.8–10.5)

## 2025-05-10 PROCEDURE — 99233 SBSQ HOSP IP/OBS HIGH 50: CPT

## 2025-05-10 PROCEDURE — 99232 SBSQ HOSP IP/OBS MODERATE 35: CPT

## 2025-05-10 RX ADMIN — Medication 1 TABLET(S): at 11:11

## 2025-05-10 RX ADMIN — CEFTRIAXONE 100 MILLIGRAM(S): 500 INJECTION, POWDER, FOR SOLUTION INTRAMUSCULAR; INTRAVENOUS at 21:07

## 2025-05-10 RX ADMIN — Medication 100 MILLIGRAM(S): at 11:11

## 2025-05-10 RX ADMIN — FOLIC ACID 1 MILLIGRAM(S): 1 TABLET ORAL at 11:11

## 2025-05-10 RX ADMIN — ATORVASTATIN CALCIUM 20 MILLIGRAM(S): 80 TABLET, FILM COATED ORAL at 21:07

## 2025-05-10 RX ADMIN — Medication 100 MILLIGRAM(S): at 09:22

## 2025-05-10 NOTE — DISCHARGE NOTE PROVIDER - NSDCFUADDAPPT_GEN_ALL_CORE_FT
APPTS ARE READY TO BE MADE: [X] YES    Best Family or Patient Contact (if needed):    Additional Information about above appointments (if needed):    1: Please make an appointment with the PCP within 1-2 wks.   2: Please see the infectious disease doctor for follow-up.   3:     Other comments or requests:    APPTS ARE READY TO BE MADE: [X] YES    Best Family or Patient Contact (if needed):    Additional Information about above appointments (if needed):    1: Please make an appointment with the PCP within 1-2 wks.   2: Please see the infectious disease doctor for follow-up.   3: Please see the Dermatologist for follow-up    Other comments or requests:    APPTS ARE READY TO BE MADE: [X] YES    Best Family or Patient Contact (if needed):    Additional Information about above appointments (if needed):    1: Please make an appointment with the PCP within 1-2 wks.   2: Please see the infectious disease doctor for follow-up. (Elroy Morales 05/19)  3: Please see the Dermatologist for follow-up    Other comments or requests:    APPTS ARE READY TO BE MADE: [X] YES    Best Family or Patient Contact (if needed):    Additional Information about above appointments (if needed):    1: Please make an appointment with the PCP within 1-2 wks.   2: Please see the infectious disease doctor for follow-up. (Elroy Morales 05/19)  3: Please see the Dermatologist for follow-up    Other comments or requests:     Prior to outreaching the patient, it was visible that the patient has secured a follow up appointment which was not scheduled by our team. Patient is scheduled to see Dr. Morales on 5/19 at [address] [place] [id number] APPTS ARE READY TO BE MADE: [X] YES    Best Family or Patient Contact (if needed):    Additional Information about above appointments (if needed):    1: Please make an appointment with the PCP within 1-2 wks.   2: Please see the infectious disease doctor for follow-up. (Elroy Morales 05/19)  3: Please see the Dermatologist for follow-up    Other comments or requests:   Patient advised they did not want to proceed with scheduling appointments with the providers on their referrals. They will coordinate care on their own.     Prior to outreaching the patient, it was visible that the patient has secured a follow up appointment which was not scheduled by our team. Patient is scheduled to see Dr. Morales on 5/19 at [address] [place] [phone]

## 2025-05-10 NOTE — DISCHARGE NOTE PROVIDER - NSDCCPCAREPLAN_GEN_ALL_CORE_FT
PRINCIPAL DISCHARGE DIAGNOSIS  Diagnosis: Gonorrhea  Assessment and Plan of Treatment: You came because you were having some fevers at home and you went to see your PCP for the skin lesions on your hands. You remained well while in the hospital with only one fever. You developed some throat pain and we saw that there was inflammation and some exudate in the back of your throat. We got a throat swab and that was positive for gonorrhea. We tested you for other STIs, other vector born disease, and rheumatologic disease, and everything else was negative. There was an indeterminate elevation in the atypical ANCA. With the ID doctor, we discussed extensively about safe sexual practices in order to prevent a second transmission. You were not intersted in starting pre-exposure prophlyaxis for HIV at this time. If you develop fever/chills >100.3F, confusion, more rash, lightheadedness, seizure, vision changes, joint pain, abdominal pain, nausea/vomit, penile discharge, please return to the ED.     PRINCIPAL DISCHARGE DIAGNOSIS  Diagnosis: Gonorrhea  Assessment and Plan of Treatment: You came because you were having some fevers at home and you went to see your PCP for the skin lesions on your hands. You remained well while in the hospital with only one fever. You developed some throat pain and we saw that there was inflammation and some exudate in the back of your throat. We got a throat swab and that was positive for gonorrhea. We tested you for other STIs, other vector born disease, and rheumatologic disease, and everything else was negative. There was an indeterminate elevation in the atypical ANCA. With the ID doctor, we discussed extensively about safe sexual practices in order to prevent a second transmission. You were not intersted in starting pre-exposure prophlyaxis for HIV at this time. If you develop fever/chills >100.3F, confusion, more rash, lightheadedness, seizure, vision changes, joint pain, abdominal pain, nausea/vomit, penile discharge, please return to the ED. Also, we stopped your olmesartan based on your blood pressures - please discuss this with your primary care physician.     PRINCIPAL DISCHARGE DIAGNOSIS  Diagnosis: Gonorrhea  Assessment and Plan of Treatment: You came because you were having some fevers at home and you went to see your PCP for the skin lesions on your hands. You remained well while in the hospital with only one fever. You developed some throat pain and we saw that there was inflammation and some exudate in the back of your throat. We got a throat swab and that was positive for gonorrhea. We tested you for other STIs, other vector born disease, and rheumatologic disease, and everything else was negative. There was an indeterminate elevation in the atypical ANCA. With the ID doctor, we discussed extensively about safe sexual practices in order to prevent a second transmission. You were not intersted in starting pre-exposure prophlyaxis for HIV at this time.   Please take ceftriaxone, the IV antibiotic, until 5/19 as prescribed.   If you develop fever/chills >100.3F, confusion, more rash, lightheadedness, seizure, vision changes, joint pain, abdominal pain, nausea/vomit, penile discharge, please return to the ED. Also, we stopped your olmesartan based on your blood pressures - please discuss this with your primary care physician.

## 2025-05-10 NOTE — DISCHARGE NOTE PROVIDER - CARE PROVIDER_API CALL
Elroy Morales  Infectious Disease  [address] [place] [id number]  Phone: [phone]  Fax: [phone]  Established Patient  Follow Up Time:    Elroy Morales  Infectious Disease  [address] [place] [id number]  Phone: [phone]  Fax: [phone]  Established Patient  Follow Up Time: 2 weeks   Elroy Morales  Infectious Disease  [address] [place] [id number]  Phone: [phone]  Fax: [phone]  Established Patient  Follow Up Time: 1 week

## 2025-05-10 NOTE — PROGRESS NOTE ADULT - SUBJECTIVE AND OBJECTIVE BOX
Follow Up:      Inverval History/ROS:Patient is a 54y old  Male who presents with a chief complaint of chest pain, fevers (10 May 2025 10:10)    Throat pain is better  No fever    Allergies    No Known Allergies    Intolerances        ANTIMICROBIALS:  cefTRIAXone   IVPB 2000 every 24 hours      OTHER MEDS:  acetaminophen     Tablet .. 650 milliGRAM(s) Oral every 6 hours PRN  atorvastatin 20 milliGRAM(s) Oral at bedtime  benzocaine/menthol Lozenge 1 Lozenge Oral every 2 hours PRN  benzocaine/menthol Lozenge 1 Lozenge Oral three times a day PRN  folic acid 1 milliGRAM(s) Oral daily  lactated ringers. 500 milliLiter(s) IV Continuous <Continuous>  melatonin 3 milliGRAM(s) Oral at bedtime PRN  multivitamin 1 Tablet(s) Oral daily  ondansetron Injectable 4 milliGRAM(s) IV Push every 8 hours PRN  thiamine 100 milliGRAM(s) Oral daily      Vital Signs Last 24 Hrs  T(C): 36.9 (10 May 2025 07:32), Max: 37.2 (09 May 2025 16:46)  T(F): 98.4 (10 May 2025 07:32), Max: 99 (09 May 2025 16:46)  HR: 91 (10 May 2025 07:32) (78 - 100)  BP: 109/76 (10 May 2025 07:32) (101/70 - 121/78)  BP(mean): --  RR: 18 (10 May 2025 07:32) (18 - 18)  SpO2: 95% (10 May 2025 07:32) (95% - 99%)    Parameters below as of 10 May 2025 07:32  Patient On (Oxygen Delivery Method): room air        PHYSICAL EXAM:  General: [x ] non-toxic  HEAD/EYES: [ ] PERRL [x ] white sclera [ ] icterus  ENT:  [ ] normal [x ] supple [ ] thrush [ ] pharyngeal exudate  Cardiovascular:   [ ] murmur [x ] normal [ ] PPM/AICD  Respiratory:  [x ] clear to ausculation bilaterally  GI:  [x ] soft, non-tender, normal bowel sounds  :  [ ] kent [ ] no CVA tenderness   Musculoskeletal:  [ ] no synovitis  Neurologic:  [ ] non-focal exam   Skin:  [ ] no rash  Lymph: [x ] no lymphadenopathy  Psychiatric:  [ ] appropriate affect [ ] alert & oriented  Lines:  [x ] no phlebitis [ ] central line                                15.3   11.12 )-----------( 394      ( 10 May 2025 07:18 )             46.2       05-10    133[L]  |  98  |  19  ----------------------------<  82  4.2   |  19[L]  |  1.14    Ca    9.8      10 May 2025 07:18  Phos  3.1     05-09  Mg     2.2     05-09    TPro  9.0[H]  /  Alb  3.8  /  TBili  0.6  /  DBili  x   /  AST  47[H]  /  ALT  93[H]  /  AlkPhos  93  05-10      Urinalysis Basic - ( 10 May 2025 07:18 )    Color: x / Appearance: x / SG: x / pH: x  Gluc: 82 mg/dL / Ketone: x  / Bili: x / Urobili: x   Blood: x / Protein: x / Nitrite: x   Leuk Esterase: x / RBC: x / WBC x   Sq Epi: x / Non Sq Epi: x / Bacteria: x        MICROBIOLOGY:Culture Results:   No growth at 48 Hours (05-08-25 @ 07:29)  Culture Results:   No growth at 48 Hours (05-08-25 @ 07:29)  Culture Results:   No growth (05-07-25 @ 18:57)  Culture Results:   No growth to date. (05-07-25 @ 18:57)  Culture Results:   No Streptococcus pyogenes (Group A) isolated (05-07-25 @ 12:23)  Culture Results:   No growth (05-06-25 @ 03:45)  Culture Results:   No growth at 4 days (05-05-25 @ 18:37)  Culture Results:   No growth at 4 days (05-05-25 @ 18:00)      RADIOLOGY:

## 2025-05-10 NOTE — DISCHARGE NOTE PROVIDER - NSDCFUSCHEDAPPT_GEN_ALL_CORE_FT
Elroy Morales  [facility]  INFDISEASE [address]  Scheduled Appointment: 05/19/2025

## 2025-05-10 NOTE — DISCHARGE NOTE PROVIDER - NSFOLLOWUPCLINICS_GEN_ALL_ED_FT
[facility] General Internal Medicine  General Internal Medicine  [address] [place] [address]  Phone: [phone]  Fax:      [facility] General Internal Medicine  General Internal Medicine  [address] [place] [address]  Phone: [phone]  Fax:     [facility] - [facility]  Dermatology  [address]  [place] [address]  Phone: [phone]  Fax: [phone]  Follow Up Time: 1 month

## 2025-05-10 NOTE — DISCHARGE NOTE PROVIDER - PROVIDER TOKENS
PROVIDER:[TOKEN:[[id number]],ESTABLISHEDPATIENT:[T]] PROVIDER:[TOKEN:[[id number]],FOLLOWUP:[2 weeks],ESTABLISHEDPATIENT:[T]] PROVIDER:[TOKEN:[[id number]],FOLLOWUP:[1 week],ESTABLISHEDPATIENT:[T]]

## 2025-05-10 NOTE — DISCHARGE NOTE PROVIDER - NSDCMRMEDTOKEN_GEN_ALL_CORE_FT
atorvastatin 20 mg oral tablet: 1 orally once a day (at bedtime)  olmesartan 20 mg oral tablet: 1 orally once a day   atorvastatin 20 mg oral tablet: 1 tab(s) orally once a day (at bedtime)  Ceftriaxone 2 grams intravenously, once daily, until 05/19/2025: Ceftriaxone 2 grams intravenously, once daily, until 05/19/2025  folic acid 1 mg oral tablet: 1 tab(s) orally once a day  thiamine 100 mg oral tablet: 1 tab(s) orally once a day   atorvastatin 20 mg oral tablet: 1 tab(s) orally once a day (at bedtime)  cefTRIAXone 2 g/50 mL-iso-osmotic dextrose intravenous solution: 2 gram(s) intravenously once a day Please take daily from 5/14-5/19  Ceftriaxone 2 grams intravenously, once daily, until 05/19/2025: Ceftriaxone 2 grams intravenously, once daily, until 05/19/2025  folic acid 1 mg oral tablet: 1 tab(s) orally once a day  thiamine 100 mg oral tablet: 1 tab(s) orally once a day   atorvastatin 20 mg oral tablet: 1 tab(s) orally once a day (at bedtime)  folic acid 1 mg oral tablet: 1 tab(s) orally once a day  thiamine 100 mg oral tablet: 1 tab(s) orally once a day

## 2025-05-10 NOTE — PROGRESS NOTE ADULT - ASSESSMENT
54M with history of HTN, HLD, meliodosis, sternal abscess s/p I&D here for subacute multiple symptoms (palpitations, CP, night sweats) and few scattered lesions on fingers and lateral aspect of feet with recent travel exposure and multiple sexual partners in [place]; currently s/p punch biopsy of finger extremities likely disseminated gonorrhea.

## 2025-05-10 NOTE — PROGRESS NOTE ADULT - PROBLEM SELECTOR PLAN 1
Patient with pharyngitis with a stone. Throat PCR positive for gonorrhea. Discussed sexual education about safe practices. Precontemplative about PREP. Contemplative about changing sexual practices.     -F/u wound cx to asses growth of gonorrhea   -C/w ceftriaxone 2000 mg IV qd (started 5/9)  -S/p ceftriaxone 1000 mg IV qd (5/7-5/9)

## 2025-05-10 NOTE — PROGRESS NOTE ADULT - PROBLEM SELECTOR PLAN 2
#RESOLVED   Reported fever at home with recurrent fever; arrived with leukocytosis WBC 12 and downtrending.No further fever nor tachycardia.  Recent travel exposure, multiple sexual partners with reported condom use, and development of nonspecific symptoms and dark lesions on fingers/hands. Tonsillitis with 1 pustule vs stone. DDx: STI vs other zoonotic infection vs less likely rheumatic fever     -Appreciate ID recommendations   -5/5 blood and urine cx NGTD   -Completed doxycycline 100 mg IV q12 (started 5/6-5/10)   -C/w meropenem 1000 mg IV q8 (started 5/6) -> de-escalated to CTX (5/7 - until 5/9)   -Restarted ceftriaxone 2000 mg IV qd (5/9) for gonorhrea   -Neg throat cx to r/o strep throat   -NGTD 5/8 blood cx AM  -Tentative SYMONE on monday 5/12 to r/o blood cx negative infective endocarditis per ID

## 2025-05-10 NOTE — PROGRESS NOTE ADULT - ASSESSMENT
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    1) New skin lesions to hands/ feet  He has a history of melioidoses  These lesions seem atypical for melioidoses    TTE without vegetation   Urine GC/chalmydia negative  Syphillis screen negative  RMSF serology negative     High inflammatory markers    Pharygeal PCR positive for GC  ? Disseminated GC as cause of skin lesions    Abx day 5    COntinue Ceftriaxone     Follow up derm biopsy results    I would still check SYMONE    2) Chest pain   Location of prior infection where melioidoses was diagnosed looks good  Imaging without focus of infection in the chest    3) Sore throat  due to GC  On ceftriaxone  Check culture of throat and ask lab to try to isolate GC    4) PREP discussion  I discussed PREP role in prevention of HIV  Pt did not want to start at this time  I will continue to discuss with him    Case dw resident

## 2025-05-10 NOTE — PROGRESS NOTE ADULT - SUBJECTIVE AND OBJECTIVE BOX
INPATIENT MEDICINE PROGRESS NOTE:   Authored by Jasmin Junior MD     HISTORY OF PRESENT ILLNESS:    Adri. Found to have gonorrhea on throat PCR. STarted back on ceftriaxone 2000 mg IV qd. Afebrile, HR 80s-100. SBPs 110-120s. RA.     SEen and examined at bedside with ID physician, patient reports some improvement in throat pain. Not ready to start PREP. Reports having been with girls in [place], however now comments that he may have to change plans when he goes to [facility] and make sure they are STD checked.     PHYSICAL EXAM:  Vital Signs Last 24 Hrs  T(C): 36.9 (10 May 2025 07:32), Max: 37.2 (09 May 2025 16:46)  T(F): 98.4 (10 May 2025 07:32), Max: 99 (09 May 2025 16:46)  HR: 91 (10 May 2025 07:32) (78 - 100)  BP: 109/76 (10 May 2025 07:32) (101/70 - 121/78)  BP(mean): --  RR: 18 (10 May 2025 07:32) (18 - 18)  SpO2: 95% (10 May 2025 07:32) (95% - 99%)    Parameters below as of 10 May 2025 07:32  Patient On (Oxygen Delivery Method): room air    General: NAD, calm, cooperative, overweight habitus   Neuro: AAOx3, 5/5  strength b/l, 5/5 hip flexion/extension, spontaneity, no tongue fascitulations, no asterixis   HEENT: NC/AT, PERRLA b/l, EOMI, nonconjunctival pallor, erythema and edematous tonsils L>R  with 1 L sided tonsil stone  Chest: old scar in upper sternum with divet 0.5 cm from old I&D   Heart: S1/S2, RRR   Lungs: CTA b/l, nonlabored breathing   Abd: soft, nonTTP, nondistended   Ext: 1 cm firm dark blood collection lateral aspect of L 4th finger and 3rd middle digit of R hand with wound dressing, scatterd 3-4 simialr lesions on lateral sole aspect of feet b/l   Back: nonTTP   Psych: full range affect, hyperverbal, mutual topic, linear and goal directed thought process      I&O's Summary    09 May 2025 07:01  -  10 May 2025 07:00  --------------------------------------------------------  IN: 1110 mL / OUT: 400 mL / NET: 710 mL        LABS:                        15.3   11.12 )-----------( 394      ( 10 May 2025 07:18 )             46.2     05-10    133[L]  |  98  |  19  ----------------------------<  82  4.2   |  19[L]  |  1.14    Ca    9.8      10 May 2025 07:18  Phos  3.1     05-09  Mg     2.2     05-09    TPro  9.0[H]  /  Alb  3.8  /  TBili  0.6  /  DBili  x   /  AST  47[H]  /  ALT  93[H]  /  AlkPhos  93  05-10    CAPILLARY BLOOD GLUCOSE              Urinalysis Basic - ( 10 May 2025 07:18 )    Color: x / Appearance: x / SG: x / pH: x  Gluc: 82 mg/dL / Ketone: x  / Bili: x / Urobili: x   Blood: x / Protein: x / Nitrite: x   Leuk Esterase: x / RBC: x / WBC x   Sq Epi: x / Non Sq Epi: x / Bacteria: x        Culture - Blood (collected 08 May 2025 07:29)  Source: Blood Blood-Peripheral  Preliminary Report (10 May 2025 09:01):    No growth at 48 Hours    Culture - Blood (collected 08 May 2025 07:29)  Source: Blood Blood-Peripheral  Preliminary Report (10 May 2025 09:01):    No growth at 48 Hours    Culture - Tissue with Gram Stain (collected 07 May 2025 18:57)  Source: Tissue Other  Gram Stain (08 May 2025 01:14):    No polymorphonuclear leukocytes seen per low power field    No organisms seen per oil power field  Preliminary Report (08 May 2025 20:22):    No growth to date.    Culture - Fungal, Tissue (collected 07 May 2025 18:57)  Source: Tissue  Preliminary Report (09 May 2025 08:17):    No growth    Culture - Acid Fast - Tissue w/Smear (collected 07 May 2025 18:57)  Source: Tissue Other    Culture - Group A Streptococcus (collected 07 May 2025 12:23)  Source: Throat  Final Report (08 May 2025 18:30):    No Streptococcus pyogenes (Group A) isolated        MEDICATIONS  (STANDING):  atorvastatin 20 milliGRAM(s) Oral at bedtime  cefTRIAXone   IVPB 2000 milliGRAM(s) IV Intermittent every 24 hours  folic acid 1 milliGRAM(s) Oral daily  lactated ringers. 500 milliLiter(s) (250 mL/Hr) IV Continuous <Continuous>  multivitamin 1 Tablet(s) Oral daily  thiamine 100 milliGRAM(s) Oral daily    MEDICATIONS  (PRN):  acetaminophen     Tablet .. 650 milliGRAM(s) Oral every 6 hours PRN Temp greater or equal to 38C (100.4F), Mild Pain (1 - 3)  benzocaine/menthol Lozenge 1 Lozenge Oral every 2 hours PRN Sore Throat  benzocaine/menthol Lozenge 1 Lozenge Oral three times a day PRN Sore Throat  melatonin 3 milliGRAM(s) Oral at bedtime PRN Insomnia  ondansetron Injectable 4 milliGRAM(s) IV Push every 8 hours PRN Nausea and/or Vomiting

## 2025-05-10 NOTE — DISCHARGE NOTE PROVIDER - HOSPITAL COURSE
HPI:  54M pmhx HTN, HLD, sternal abcess s/p I&D and wound vac found to have meliodosis on wound culture s/p meropenem and Bactrim eradication therapy presented w/ palpitations and SOB x4 days. He initially noticed generalized fatigue about 1 week ago, then he started noticing hand and foot dark lesions about 4 days ago. He tried to have the foot lesions treated at a pedicure. Patient was in [place] x3 months and returned 4 days ago. [place], he saw a doctor there who prescribed an antibiotic and an "antiinflammatory" medication. He then developed palpitations and SOB, which was particularly worsened over the past few days. Over this time, he has also been having night sweats and fevers. He has been taking Tylenol and ibuprofen. He was then sent in by his PMD given his concern for meliodosis    He also reports daily alcohol use consisting of 3 "tall" beers per day, which he stopped about 3-4 days ago. Denies history of alcohol withdrawal or ICU for alcohol withdrawal. Denies current AV hallucinations, tremors.    ED course: ID consult --> doxy 100 x1, meropenem 1000mg x1. 1L LR (06 May 2025 00:23)    Hospital Course:    On arrival to floor, patient was initially asymptomatic and then reported some throat pain. Oral exam showed erythematous and edematus pharynx with a stone observed and slight exudate. Dermatology was consulted for hemorrhagic papularvesicular lesions on fingers/feet and they performed a punch biopsy. ID was consulted due to unclear nature of lesions with recommendation of SYMONE to r/o infective endocarditis blood cx negative in addition to other potential valve disease related to infection with multiple sexual exposure in addition to vectors and water. Throat PCR showed gonorrhea positive and patient was started gain on ceftriaxone 2000 mg IV qd. Wound cx grew...with sensitivities to. Extensive infectious and rheum workup showed indeterminate atypical ANCA and moderately elevated ESR/CRP. SYMONE showed... Extensive discussion about safe sexual practices was held with patient and patient declined interest in HIV pre-exposure ppx at this time.     Important Medication Changes and Reason:    Active or Pending Issues Requiring Follow-up:    Advanced Directives:   [ ] Full code  [ ] DNR  [ ] Hospice    Discharge Diagnoses:         HPI:  54M pmhx HTN, HLD, sternal abcess s/p I&D and wound vac found to have meliodosis on wound culture s/p meropenem and Bactrim eradication therapy presented w/ palpitations and SOB x4 days. He initially noticed generalized fatigue about 1 week ago, then he started noticing hand and foot dark lesions about 4 days ago. He tried to have the foot lesions treated at a pedicure. Patient was in [place] x3 months and returned 4 days ago. [place], [place] doctor there who prescribed an antibiotic and an "antiinflammatory" medication. He then developed palpitations and SOB, which was particularly worsened over the past few days. Over this time, he has also been having night sweats and fevers. He has been taking Tylenol and ibuprofen. He was then sent in by his PMD given his concern for meliodosis    He also reports daily alcohol use consisting of 3 "tall" beers per day, which he stopped about 3-4 days ago. Denies history of alcohol withdrawal or ICU for alcohol withdrawal. Denies current AV hallucinations, tremors.    ED course: ID consult --> doxy 100 x1, meropenem 1000mg x1. 1L LR (06 May 2025 00:23)    Hospital Course:    On arrival to floor, patient was initially asymptomatic and then reported some throat pain. Oral exam showed erythematous and edematus pharynx with a stone observed and slight exudate. Atoravsatatin 20 mg was uptitrated to 40 mg, however LFTs increased and returned to original dose; and LFTs decerased. Dermatology was consulted for hemorrhagic papularvesicular lesions on fingers/feet and they performed a punch biopsy. TTE showed LVEF 59% without RWA. ID was consulted due to unclear nature of lesions with recommendation of SYMONE to r/o infective endocarditis blood cx negative in addition to other potential valve disease related to infection with multiple sexual exposure in addition to vectors and water. Throat PCR showed gonorrhea positive and patient was started gain on ceftriaxone 2000 mg IV qd. Wound cx grew...with sensitivities to. Extensive infectious and rheum workup showed indeterminate atypical ANCA and moderately elevated ESR/CRP. SYMONE showed... Extensive discussion about safe sexual practices was held with patient and patient declined interest in HIV pre-exposure ppx at this time. On day of discharge, patient was medically stable and safe to return home.     Important Medication Changes and Reason:  HELD olmesartan 20 mg po qd     Active or Pending Issues Requiring Follow-up:  1) derm biopsy- f/u results   2) disseminated gonorrhea- f/u outpatient with ID for further management including lesions   3) HTN- f/u with PCP regarding antihtn medications   4) alcohol- f/u about alcohol use for cessation     Advanced Directives:   [X] Full code  [ ] DNR  [ ] Hospice    Discharge Diagnoses:  Disseminated gonorrhea 2/2 high risk sexual practices        HPI:  54M pmhx HTN, HLD, sternal abcess s/p I&D and wound vac found to have meliodosis on wound culture s/p meropenem and Bactrim eradication therapy presented w/ palpitations and SOB x4 days. He initially noticed generalized fatigue about 1 week ago, then he started noticing hand and foot dark lesions about 4 days ago. He tried to have the foot lesions treated at a pedicure. Patient was in [place] x3 months and returned 4 days ago. [place] doctor [place] who prescribed an antibiotic and an "antiinflammatory" medication. He then developed palpitations and SOB, which was particularly worsened over the past few days. Over this time, he has also been having night sweats and fevers. He has been taking Tylenol and ibuprofen. He was then sent in by his PMD given his concern for meliodosis    He also reports daily alcohol use consisting of 3 "tall" beers per day, which he stopped about 3-4 days ago. Denies history of alcohol withdrawal or ICU for alcohol withdrawal. Denies current AV hallucinations, tremors.    ED course: ID consult --> doxy 100 x1, meropenem 1000mg x1. 1L LR (06 May 2025 00:23)    Hospital Course:    On arrival to floor, patient was initially asymptomatic and then reported some throat pain. Oral exam showed erythematous and edematus pharynx with a stone observed and slight exudate. Atoravsatatin 20 mg was uptitrated to 40 mg, however LFTs increased and returned to original dose; and LFTs decerased. Dermatology was consulted for hemorrhagic papularvesicular lesions on fingers/feet and they performed a punch biopsy. TTE showed LVEF 59% without RWA. ID was consulted due to unclear nature of lesions with recommendation of SYMONE to r/o infective endocarditis blood cx negative in addition to other potential valve disease related to infection with multiple sexual exposure in addition to vectors and water. Throat PCR showed gonorrhea positive and patient was started gain on ceftriaxone 2000 mg IV qd. Wound cx did not have growth, and no sensitivities resulted. Extensive infectious and rheum workup showed indeterminate atypical ANCA and moderately elevated ESR/CRP. SYMONE showed no vegetations. Extensive discussion about safe sexual practices was held with patient and patient declined interest in HIV pre-exposure ppx at this time. A midline was ordered to be placed with IV Ceftriaxone to be continued until 05/19 on discharge. On day of discharge, patient was medically stable and safe to return home.     Important Medication Changes and Reason:  HELD olmesartan 20 mg po qd   DOWNTITRATED atoravastatin  40 to 20 mg po qhs.  START Ceftriaxone 2g daily until 05/19     Active or Pending Issues Requiring Follow-up:  1) derm biopsy- f/u results   2) disseminated gonorrhea- f/u outpatient with ID for further management including lesions   3) HTN- f/u with PCP regarding antihtn medications   4) alcohol- f/u about alcohol use for cessation     Advanced Directives:   [X] Full code  [ ] DNR  [ ] Hospice    Discharge Diagnoses:  Disseminated gonorrhea 2/2 high risk sexual practices   Skin Lesions        HPI:  54M pmhx HTN, HLD, sternal abcess s/p I&D and wound vac found to have meliodosis on wound culture s/p meropenem and Bactrim eradication therapy presented w/ palpitations and SOB x4 days. He initially noticed generalized fatigue about 1 week ago, then he started noticing hand and foot dark lesions about 4 days ago. He tried to have the foot lesions treated at a pedicure. Patient was in [place] x3 months and returned 4 days ago. [place] doctor [place] who prescribed an antibiotic and an "antiinflammatory" medication. He then developed palpitations and SOB, which was particularly worsened over the past few days. Over this time, he has also been having night sweats and fevers. He has been taking Tylenol and ibuprofen. He was then sent in by his PMD given his concern for meliodosis    He also reports daily alcohol use consisting of 3 "tall" beers per day, which he stopped about 3-4 days ago. Denies history of alcohol withdrawal or ICU for alcohol withdrawal. Denies current AV hallucinations, tremors.    ED course: ID consult --> doxy 100 x1, meropenem 1000mg x1. 1L LR (06 May 2025 00:23)    Hospital Course:    On arrival to floor, patient was initially asymptomatic and then reported some throat pain. Oral exam showed erythematous and edematus pharynx with a stone observed and slight exudate. Atoravsatatin 20 mg was uptitrated to 40 mg, however LFTs increased and returned to original dose; and LFTs decerased. Dermatology was consulted for hemorrhagic papularvesicular lesions on fingers/feet and they performed a punch biopsy. TTE showed LVEF 59% without RWA. ID was consulted due to unclear nature of lesions with recommendation of SYMONE to r/o infective endocarditis blood cx negative in addition to other potential valve disease related to infection with multiple sexual exposure in addition to vectors and water. Throat PCR showed gonorrhea positive and patient was started gain on ceftriaxone 2000 mg IV qd. Wound cx did not have growth, and no sensitivities resulted. Extensive infectious and rheum workup showed indeterminate atypical ANCA and moderately elevated ESR/CRP. SYMONE showed no vegetations. Extensive discussion about safe sexual practices was held with patient and patient declined interest in HIV pre-exposure ppx at this time. A midline was ordered to be placed, with IV Ceftriaxone to be continued until 05/19 on discharge. On day of discharge, patient was medically stable and safe to return home.     Important Medication Changes and Reason:  HOLD olmesartan 20 mg po qd   DOWNTITRATED atoravastatin  40 to 20 mg po qhs.  START Ceftriaxone 2g IV (via midline) daily until 05/19   START folic acid 1 mg oral tablet: 1 tab(s) orally once a day  START thiamine 100 mg oral tablet: 1 tab(s) orally once a day    Active or Pending Issues Requiring Follow-up:  1) derm biopsy- f/u results   2) disseminated gonorrhea- f/u outpatient with ID for further management including lesions   3) HTN- f/u with PCP regarding antihtn medications   4) alcohol- f/u about alcohol use for cessation     Advanced Directives:   [X] Full code  [ ] DNR  [ ] Hospice    Discharge Diagnoses:  Disseminated gonorrhea 2/2 high risk sexual practices   Skin Lesions

## 2025-05-10 NOTE — DISCHARGE NOTE PROVIDER - NSDCCPTREATMENT_GEN_ALL_CORE_FT
PRINCIPAL PROCEDURE  Procedure: Transesophageal echocardiography (SYMONE)  Findings and Treatment:      PRINCIPAL PROCEDURE  Procedure: Transesophageal echocardiography (SYMONE)  Findings and Treatment: CONCLUSIONS:      1. SYMONE performed to evaluate for valvular vegetations.   2. There is no evidence of a valvular vegetation.   3. Left ventricular cavity is normal in size. Left ventricular systolic function is normal with an ejection fraction of 52 % by 3D.   4. Normal right ventricular cavity size and normal right ventricular systolic function.   5. No pericardial effusion seen.  ____________________________________________________________________  SYMONE Procedure:  After discussion of the risks and benefits of the SYMONE, an informed consent was obtained. Local oropharyngeal anesthetic was provided with Benzocaine spray. Study was performed with anesthesia (please see anesthesia record).  The adult Kit SYMONE probe was introduced and passed into the esophagus. The SYMONE probe was passed without difficulty. Images were obtained with the patient in a left lateral decubitus position. Image quality was good. The patient's vital signs; including heart rate, blood pressure, and oxygen saturation; remained stable throughout the procedure. The patient tolerated the procedure well and without complications.

## 2025-05-11 LAB
ANION GAP SERPL CALC-SCNC: 16 MMOL/L — SIGNIFICANT CHANGE UP (ref 5–17)
B BURGDOR IGG SER QL IB: NEGATIVE — SIGNIFICANT CHANGE UP
B BURGDOR IGM SER QL IB: NEGATIVE — SIGNIFICANT CHANGE UP
BASOPHILS # BLD AUTO: 0 K/UL — SIGNIFICANT CHANGE UP (ref 0–0.2)
BASOPHILS NFR BLD AUTO: 0 % — SIGNIFICANT CHANGE UP (ref 0–2)
BUN SERPL-MCNC: 19 MG/DL — SIGNIFICANT CHANGE UP (ref 7–23)
CALCIUM SERPL-MCNC: 9.3 MG/DL — SIGNIFICANT CHANGE UP (ref 8.4–10.5)
CHLORIDE SERPL-SCNC: 101 MMOL/L — SIGNIFICANT CHANGE UP (ref 96–108)
CO2 SERPL-SCNC: 16 MMOL/L — LOW (ref 22–31)
CREAT SERPL-MCNC: 1.06 MG/DL — SIGNIFICANT CHANGE UP (ref 0.5–1.3)
EGFR: 84 ML/MIN/1.73M2 — SIGNIFICANT CHANGE UP
EGFR: 84 ML/MIN/1.73M2 — SIGNIFICANT CHANGE UP
EOSINOPHIL # BLD AUTO: 0.06 K/UL — SIGNIFICANT CHANGE UP (ref 0–0.5)
EOSINOPHIL NFR BLD AUTO: 0.9 % — SIGNIFICANT CHANGE UP (ref 0–6)
GLUCOSE SERPL-MCNC: 84 MG/DL — SIGNIFICANT CHANGE UP (ref 70–99)
HCT VFR BLD CALC: 49.9 % — SIGNIFICANT CHANGE UP (ref 39–50)
HGB BLD-MCNC: 16 G/DL — SIGNIFICANT CHANGE UP (ref 13–17)
LYMPHOCYTES # BLD AUTO: 1.49 K/UL — SIGNIFICANT CHANGE UP (ref 1–3.3)
LYMPHOCYTES # BLD AUTO: 21.1 % — SIGNIFICANT CHANGE UP (ref 13–44)
MANUAL SMEAR VERIFICATION: SIGNIFICANT CHANGE UP
MCHC RBC-ENTMCNC: 27.2 PG — SIGNIFICANT CHANGE UP (ref 27–34)
MCHC RBC-ENTMCNC: 32.1 G/DL — SIGNIFICANT CHANGE UP (ref 32–36)
MCV RBC AUTO: 84.9 FL — SIGNIFICANT CHANGE UP (ref 80–100)
MONOCYTES # BLD AUTO: 0.84 K/UL — SIGNIFICANT CHANGE UP (ref 0–0.9)
MONOCYTES NFR BLD AUTO: 11.9 % — SIGNIFICANT CHANGE UP (ref 2–14)
N GONORRHOEA RRNA SPEC QL NAA+PROBE: SIGNIFICANT CHANGE UP
NEUTROPHILS # BLD AUTO: 4.39 K/UL — SIGNIFICANT CHANGE UP (ref 1.8–7.4)
NEUTROPHILS NFR BLD AUTO: 62.4 % — SIGNIFICANT CHANGE UP (ref 43–77)
PLAT MORPH BLD: NORMAL — SIGNIFICANT CHANGE UP
PLATELET # BLD AUTO: 178 K/UL — SIGNIFICANT CHANGE UP (ref 150–400)
POTASSIUM SERPL-MCNC: 4.4 MMOL/L — SIGNIFICANT CHANGE UP (ref 3.5–5.3)
POTASSIUM SERPL-SCNC: 4.4 MMOL/L — SIGNIFICANT CHANGE UP (ref 3.5–5.3)
RBC # BLD: 5.88 M/UL — HIGH (ref 4.2–5.8)
RBC # FLD: 12.7 % — SIGNIFICANT CHANGE UP (ref 10.3–14.5)
RBC BLD AUTO: SIGNIFICANT CHANGE UP
SODIUM SERPL-SCNC: 133 MMOL/L — LOW (ref 135–145)
SPECIMEN SOURCE: SIGNIFICANT CHANGE UP
VARIANT LYMPHS # BLD: 3.7 % — SIGNIFICANT CHANGE UP (ref 0–6)
VARIANT LYMPHS NFR BLD MANUAL: 3.7 % — SIGNIFICANT CHANGE UP (ref 0–6)
WBC # BLD: 7.04 K/UL — SIGNIFICANT CHANGE UP (ref 3.8–10.5)
WBC # FLD AUTO: 7.04 K/UL — SIGNIFICANT CHANGE UP (ref 3.8–10.5)

## 2025-05-11 PROCEDURE — 99232 SBSQ HOSP IP/OBS MODERATE 35: CPT

## 2025-05-11 RX ADMIN — Medication 650 MILLIGRAM(S): at 22:33

## 2025-05-11 RX ADMIN — CEFTRIAXONE 100 MILLIGRAM(S): 500 INJECTION, POWDER, FOR SOLUTION INTRAMUSCULAR; INTRAVENOUS at 22:32

## 2025-05-11 RX ADMIN — ATORVASTATIN CALCIUM 20 MILLIGRAM(S): 80 TABLET, FILM COATED ORAL at 22:33

## 2025-05-11 RX ADMIN — FOLIC ACID 1 MILLIGRAM(S): 1 TABLET ORAL at 11:35

## 2025-05-11 RX ADMIN — Medication 100 MILLIGRAM(S): at 11:36

## 2025-05-11 RX ADMIN — Medication 1 TABLET(S): at 11:36

## 2025-05-11 NOTE — PROGRESS NOTE ADULT - PROBLEM SELECTOR PLAN 10
Code: FULL   DVT ppx: none, IMPROVE score 0   Diet: reg/DASH  Dispo: pending clinical improvement, likely home
Code: FULL   DVT ppx: none, IMPROVE score 0   Diet: reg/DASH  Dispo: pending clinical improvement, likely home
-C/w atoravastatin 20 mg po qhs
-C/w atoravastatin 20 mg po qhs

## 2025-05-11 NOTE — PROGRESS NOTE ADULT - ASSESSMENT
54M with history of HTN, HLD, meliodosis, sternal abscess s/p I&D here for subacute multiple symptoms (palpitations, CP, night sweats) and few scattered lesions on fingers and lateral aspect of feet with recent travel exposure and multiple sexual partners in [place]; currently s/p punch biopsy of finger extremities likely disseminated gonorrhea; pending tentative SYMONE 5/12 monday.

## 2025-05-11 NOTE — PROGRESS NOTE ADULT - PROBLEM SELECTOR PLAN 1
Patient with pharyngitis with a stone. Throat PCR positive for gonorrhea. Discussed sexual education about safe practices. Precontemplative about PREP. Contemplative about changing sexual practices.     -F/u wound cx to asses growth of gonorrhea   -C/w ceftriaxone 2000 mg IV qd (started 5/9; f/u with ID plans for po abx for discharge pending SYMONE results  -S/p ceftriaxone 1000 mg IV qd (5/7-5/9)

## 2025-05-11 NOTE — PROGRESS NOTE ADULT - SUBJECTIVE AND OBJECTIVE BOX
INPATIENT MEDICINE PROGRESS NOTE:   Authored by Jasmin Junior MD     HISTORY OF PRESENT ILLNESS:    NAEON. Afebrile, HR 70s-90s. SBPs 100-110s. RA.     Seen and examined at bedside, patient reports doing well. Denies further CP, palpitations. Hungry but limited food intake due to sore throat. Understanding to stay for SYMONE tomorrow. No new skins lesions.     PHYSICAL EXAM:  Vital Signs Last 24 Hrs  T(C): 36.9 (11 May 2025 04:46), Max: 37 (10 May 2025 11:26)  T(F): 98.5 (11 May 2025 04:46), Max: 98.6 (10 May 2025 11:26)  HR: 84 (11 May 2025 04:46) (77 - 100)  BP: 117/76 (11 May 2025 04:46) (106/72 - 117/76)  BP(mean): --  RR: 18 (11 May 2025 04:46) (18 - 18)  SpO2: 96% (11 May 2025 04:46) (95% - 98%)    Parameters below as of 11 May 2025 04:46  Patient On (Oxygen Delivery Method): room air      General: NAD, calm, cooperative, overweight habitus   Neuro: AAOx3, 5/5  strength b/l, 5/5 hip flexion/extension, spontaneity, no tongue fascitulations, no asterixis   HEENT: NC/AT, PERRLA b/l, EOMI, nonconjunctival pallor, erythema and edematous tonsils with 1 L sided tonsil stone  Chest: old scar in upper sternum with divet 0.5 cm from old I&D   Heart: S1/S2, RRR   Lungs: CTA b/l, nonlabored breathing   Abd: soft, nonTTP, nondistended   Ext: 1 cm firm dark blood collection lateral aspect of L 4th finger and 3rd middle digit of R hand with wound dressing, scatterd 3-4 simialr lesions on lateral sole aspect of feet b/l   Back: nonTTP   Psych: full range affect, hyperverbal, mutual topic, linear and goal directed thought process        I&O's Summary    10 May 2025 07:01  -  11 May 2025 07:00  --------------------------------------------------------  IN: 770 mL / OUT: 0 mL / NET: 770 mL        LABS:                        15.3   11.12 )-----------( 394      ( 10 May 2025 07:18 )             46.2     05-10    133[L]  |  98  |  19  ----------------------------<  82  4.2   |  19[L]  |  1.14    Ca    9.8      10 May 2025 07:18  Phos  3.1     05-09  Mg     2.2     05-09    TPro  9.0[H]  /  Alb  3.8  /  TBili  0.6  /  DBili  x   /  AST  47[H]  /  ALT  93[H]  /  AlkPhos  93  05-10    CAPILLARY BLOOD GLUCOSE              Urinalysis Basic - ( 10 May 2025 07:18 )    Color: x / Appearance: x / SG: x / pH: x  Gluc: 82 mg/dL / Ketone: x  / Bili: x / Urobili: x   Blood: x / Protein: x / Nitrite: x   Leuk Esterase: x / RBC: x / WBC x   Sq Epi: x / Non Sq Epi: x / Bacteria: x        Culture - Blood (collected 08 May 2025 07:29)  Source: Blood Blood-Peripheral  Preliminary Report (10 May 2025 09:01):    No growth at 48 Hours    Culture - Blood (collected 08 May 2025 07:29)  Source: Blood Blood-Peripheral  Preliminary Report (10 May 2025 09:01):    No growth at 48 Hours        MEDICATIONS  (STANDING):  atorvastatin 20 milliGRAM(s) Oral at bedtime  cefTRIAXone   IVPB 2000 milliGRAM(s) IV Intermittent every 24 hours  folic acid 1 milliGRAM(s) Oral daily  lactated ringers. 500 milliLiter(s) (250 mL/Hr) IV Continuous <Continuous>  multivitamin 1 Tablet(s) Oral daily  thiamine 100 milliGRAM(s) Oral daily    MEDICATIONS  (PRN):  acetaminophen     Tablet .. 650 milliGRAM(s) Oral every 6 hours PRN Temp greater or equal to 38C (100.4F), Mild Pain (1 - 3)  benzocaine/menthol Lozenge 1 Lozenge Oral every 2 hours PRN Sore Throat  benzocaine/menthol Lozenge 1 Lozenge Oral three times a day PRN Sore Throat  melatonin 3 milliGRAM(s) Oral at bedtime PRN Insomnia  ondansetron Injectable 4 milliGRAM(s) IV Push every 8 hours PRN Nausea and/or Vomiting

## 2025-05-12 ENCOUNTER — RESULT REVIEW (OUTPATIENT)
Age: 55
End: 2025-05-12

## 2025-05-12 LAB
ANA TITR SER: NEGATIVE — SIGNIFICANT CHANGE UP
ANION GAP SERPL CALC-SCNC: 17 MMOL/L — SIGNIFICANT CHANGE UP (ref 5–17)
APTT BLD: 26.6 SEC — SIGNIFICANT CHANGE UP (ref 26.1–36.8)
BASOPHILS # BLD AUTO: 0.04 K/UL — SIGNIFICANT CHANGE UP (ref 0–0.2)
BASOPHILS NFR BLD AUTO: 0.5 % — SIGNIFICANT CHANGE UP (ref 0–2)
BLD GP AB SCN SERPL QL: NEGATIVE — SIGNIFICANT CHANGE UP
BUN SERPL-MCNC: 17 MG/DL — SIGNIFICANT CHANGE UP (ref 7–23)
CALCIUM SERPL-MCNC: 9.4 MG/DL — SIGNIFICANT CHANGE UP (ref 8.4–10.5)
CHLORIDE SERPL-SCNC: 101 MMOL/L — SIGNIFICANT CHANGE UP (ref 96–108)
CO2 SERPL-SCNC: 19 MMOL/L — LOW (ref 22–31)
CREAT SERPL-MCNC: 1 MG/DL — SIGNIFICANT CHANGE UP (ref 0.5–1.3)
CULTURE RESULTS: NO GROWTH — SIGNIFICANT CHANGE UP
EGFR: 91 ML/MIN/1.73M2 — SIGNIFICANT CHANGE UP
EGFR: 91 ML/MIN/1.73M2 — SIGNIFICANT CHANGE UP
EOSINOPHIL # BLD AUTO: 0.11 K/UL — SIGNIFICANT CHANGE UP (ref 0–0.5)
EOSINOPHIL NFR BLD AUTO: 1.4 % — SIGNIFICANT CHANGE UP (ref 0–6)
GLUCOSE BLDC GLUCOMTR-MCNC: 99 MG/DL — SIGNIFICANT CHANGE UP (ref 70–99)
GLUCOSE SERPL-MCNC: 84 MG/DL — SIGNIFICANT CHANGE UP (ref 70–99)
HCT VFR BLD CALC: 43.6 % — SIGNIFICANT CHANGE UP (ref 39–50)
HGB BLD-MCNC: 14.6 G/DL — SIGNIFICANT CHANGE UP (ref 13–17)
IMM GRANULOCYTES NFR BLD AUTO: 0.6 % — SIGNIFICANT CHANGE UP (ref 0–0.9)
INR BLD: 1.12 RATIO — SIGNIFICANT CHANGE UP (ref 0.85–1.16)
LYMPHOCYTES # BLD AUTO: 1.74 K/UL — SIGNIFICANT CHANGE UP (ref 1–3.3)
LYMPHOCYTES # BLD AUTO: 22.1 % — SIGNIFICANT CHANGE UP (ref 13–44)
MCHC RBC-ENTMCNC: 27.5 PG — SIGNIFICANT CHANGE UP (ref 27–34)
MCHC RBC-ENTMCNC: 33.5 G/DL — SIGNIFICANT CHANGE UP (ref 32–36)
MCV RBC AUTO: 82.1 FL — SIGNIFICANT CHANGE UP (ref 80–100)
MONOCYTES # BLD AUTO: 0.61 K/UL — SIGNIFICANT CHANGE UP (ref 0–0.9)
MONOCYTES NFR BLD AUTO: 7.8 % — SIGNIFICANT CHANGE UP (ref 2–14)
NEUTROPHILS # BLD AUTO: 5.31 K/UL — SIGNIFICANT CHANGE UP (ref 1.8–7.4)
NEUTROPHILS NFR BLD AUTO: 67.6 % — SIGNIFICANT CHANGE UP (ref 43–77)
NRBC BLD AUTO-RTO: 0 /100 WBCS — SIGNIFICANT CHANGE UP (ref 0–0)
PLATELET # BLD AUTO: 343 K/UL — SIGNIFICANT CHANGE UP (ref 150–400)
POTASSIUM SERPL-MCNC: 4 MMOL/L — SIGNIFICANT CHANGE UP (ref 3.5–5.3)
POTASSIUM SERPL-SCNC: 4 MMOL/L — SIGNIFICANT CHANGE UP (ref 3.5–5.3)
PROTHROM AB SERPL-ACNC: 12.7 SEC — SIGNIFICANT CHANGE UP (ref 9.9–13.4)
RBC # BLD: 5.31 M/UL — SIGNIFICANT CHANGE UP (ref 4.2–5.8)
RBC # FLD: 12.8 % — SIGNIFICANT CHANGE UP (ref 10.3–14.5)
RH IG SCN BLD-IMP: POSITIVE — SIGNIFICANT CHANGE UP
SODIUM SERPL-SCNC: 137 MMOL/L — SIGNIFICANT CHANGE UP (ref 135–145)
SPECIMEN SOURCE: SIGNIFICANT CHANGE UP
WBC # BLD: 7.86 K/UL — SIGNIFICANT CHANGE UP (ref 3.8–10.5)
WBC # FLD AUTO: 7.86 K/UL — SIGNIFICANT CHANGE UP (ref 3.8–10.5)

## 2025-05-12 PROCEDURE — 93325 DOPPLER ECHO COLOR FLOW MAPG: CPT | Mod: 26

## 2025-05-12 PROCEDURE — 93312 ECHO TRANSESOPHAGEAL: CPT | Mod: 26

## 2025-05-12 PROCEDURE — 99232 SBSQ HOSP IP/OBS MODERATE 35: CPT | Mod: GC

## 2025-05-12 PROCEDURE — 99232 SBSQ HOSP IP/OBS MODERATE 35: CPT

## 2025-05-12 PROCEDURE — 93320 DOPPLER ECHO COMPLETE: CPT | Mod: 26

## 2025-05-12 RX ADMIN — CEFTRIAXONE 100 MILLIGRAM(S): 500 INJECTION, POWDER, FOR SOLUTION INTRAMUSCULAR; INTRAVENOUS at 22:54

## 2025-05-12 RX ADMIN — ATORVASTATIN CALCIUM 20 MILLIGRAM(S): 80 TABLET, FILM COATED ORAL at 22:55

## 2025-05-12 NOTE — PROGRESS NOTE ADULT - PROBLEM SELECTOR PLAN 7
- Elevated LFTs after uptitration of atoravstatin.     > c/w downtitrated atoravastatin  40 to 20 mg po qhs

## 2025-05-12 NOTE — PROGRESS NOTE ADULT - PROBLEM SELECTOR PLAN 4
#RESOLVED   Baseline SCr 0.9-1.0. SCr 2.12 on admission, currently downtrending to 1.09. DDx: prerenal vs ATN vs ibuprofen. Urine osmolality 669 and Na 73.     > Avoid nephrotoxics   > Monitor BUN/Cr   > Encourage po intake

## 2025-05-12 NOTE — PROGRESS NOTE ADULT - PROBLEM SELECTOR PLAN 5
- Euvolvemic hyponatremia.   - Studies consistent with SIADH   - s/p 1L LR in ED  - Elevated AM cortisol 29.1  - Non elevated TSH    > c/w trending serum Na

## 2025-05-12 NOTE — PROGRESS NOTE ADULT - SUBJECTIVE AND OBJECTIVE BOX
Follow Up:      Inverval History/ROS:Patient is a 54y old  Male who presents with a chief complaint of chest pain, fevers (12 May 2025 07:35)    Notes some sore throat but better  No fever    Allergies    No Known Allergies    Intolerances        ANTIMICROBIALS:  cefTRIAXone   IVPB 2000 every 24 hours      OTHER MEDS:  acetaminophen     Tablet .. 650 milliGRAM(s) Oral every 6 hours PRN  atorvastatin 20 milliGRAM(s) Oral at bedtime  benzocaine/menthol Lozenge 1 Lozenge Oral every 2 hours PRN  benzocaine/menthol Lozenge 1 Lozenge Oral three times a day PRN  folic acid 1 milliGRAM(s) Oral daily  lactated ringers. 500 milliLiter(s) IV Continuous <Continuous>  melatonin 3 milliGRAM(s) Oral at bedtime PRN  multivitamin 1 Tablet(s) Oral daily  ondansetron Injectable 4 milliGRAM(s) IV Push every 8 hours PRN  thiamine 100 milliGRAM(s) Oral daily      Vital Signs Last 24 Hrs  T(C): 36.8 (12 May 2025 11:55), Max: 36.9 (11 May 2025 23:42)  T(F): 98.2 (12 May 2025 11:55), Max: 98.5 (11 May 2025 23:42)  HR: 81 (12 May 2025 11:55) (81 - 86)  BP: 119/78 (12 May 2025 11:55) (100/69 - 121/73)  BP(mean): --  RR: 18 (12 May 2025 11:55) (18 - 18)  SpO2: 99% (12 May 2025 11:55) (94% - 99%)    Parameters below as of 12 May 2025 11:55  Patient On (Oxygen Delivery Method): room air        PHYSICAL EXAM:  General: [x ] non-toxic  HEAD/EYES: [ ] PERRL [x ] white sclera [ ] icterus  ENT:  [ ] normal [x ] supple [ ] thrush [ ] pharyngeal exudate  Cardiovascular:   [ ] murmur x[ ] normal [ ] PPM/AICD  Respiratory:  [x ] clear to ausculation bilaterally  GI:  [x ] soft, non-tender, normal bowel sounds  :  [ ] kent [ ] no CVA tenderness   Musculoskeletal:  [ ] no synovitis  Neurologic:  [ ] non-focal exam   Skin:  [x ] no rash  Lymph: [ x] no lymphadenopathy  Psychiatric:  [ ] appropriate affect [ ] alert & oriented  Lines:  [x ] no phlebitis [ ] central line                                14.6   7.86  )-----------( 343      ( 12 May 2025 07:42 )             43.6       05-12    137  |  101  |  17  ----------------------------<  84  4.0   |  19[L]  |  1.00    Ca    9.4      12 May 2025 07:41        Urinalysis Basic - ( 12 May 2025 07:41 )    Color: x / Appearance: x / SG: x / pH: x  Gluc: 84 mg/dL / Ketone: x  / Bili: x / Urobili: x   Blood: x / Protein: x / Nitrite: x   Leuk Esterase: x / RBC: x / WBC x   Sq Epi: x / Non Sq Epi: x / Bacteria: x        MICROBIOLOGY:Culture Results:   Commensal tracy consistent with body site (05-09-25 @ 21:15)  Culture Results:   No growth at 4 days (05-08-25 @ 07:29)  Culture Results:   No growth at 4 days (05-08-25 @ 07:29)  Culture Results:   Culture is being performed. (05-07-25 @ 18:57)  Culture Results:   No growth (05-07-25 @ 18:57)  Culture Results:   No growth to date. (05-07-25 @ 18:57)  Culture Results:   No Streptococcus pyogenes (Group A) isolated (05-07-25 @ 12:23)  Culture Results:   No growth (05-06-25 @ 03:45)  Culture Results:   No growth at 5 days (05-05-25 @ 18:37)  Culture Results:   No growth at 5 days (05-05-25 @ 18:00)      RADIOLOGY:

## 2025-05-12 NOTE — PROGRESS NOTE ADULT - ASSESSMENT
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    1) New skin lesions to hands/ feet  He has a history of melioidoses  These lesions seem atypical for melioidoses    TTE without vegetation   Urine GC/chalmydia negative  Syphillis screen negative  RMSF serology negative     High inflammatory markers    Pharygeal PCR positive for GC  ? Disseminated GC as cause of skin lesions  Await SYMONE    If SYMONE negative, can plan to continue Ceftriaxone 2 gram iv daily through 5/19    Follow up derm biopsy results    2) Sore throat  due to GC  On ceftriaxone  Check culture of throat- I spoke with the lab and  asked them to try to isolate GC    3) PREP discussion  I discussed PREP role in prevention of HIV  Pt did not want to start at this time  I will continue to discuss with him    Case dw resident    Follow up as an outpatient:  [address] [facility] [address]  [place] [address] [phone]   Appointment on: 5/19 at 1 pm

## 2025-05-12 NOTE — PROGRESS NOTE ADULT - PROBLEM SELECTOR PLAN 2
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 Recent travel exposure.     > C/w abx as above  > urine cx, and blood cx 5/5 NGTD

## 2025-05-12 NOTE — PROGRESS NOTE ADULT - PROBLEM SELECTOR PLAN 3
- Scattered lesions on fingers/lateral soles of feet. Unclear etiology. Recent splinter and mosquito exposure. no recent sexual activity with wife. Recent chemical/water party exposure to feet from salon, but not to hands. DDx STI vs zoonotic infection vs rheumatic fever   - Negative leishmaniasis Ab, leptospiorosis Ab , timothy mountain spotted fever IgG/IgM, Indeterminate atypical ANCA, HIV and RPR negative, Negative urine chlymydia/gonorrhea, + throat swab for Gonorrhea    > c/w monitoring   > f/u ID recommendations   > f/u dermatology recommendations, pending punch biopsy results   > F/u brucella IgG/IgM,  schihistoma Ab IgG, - Scattered lesions on fingers/lateral soles of feet. Unclear etiology. Recent splinter and mosquito exposure. no recent sexual activity with wife. Recent chemical/water party exposure to feet from salon, but not to hands. DDx STI vs zoonotic infection vs rheumatic fever   - Negative Brucella, leishmaniasis Ab, leptospiorosis Ab , timothy mountain spotted fever IgG/IgM, Indeterminate atypical ANCA, HIV and RPR negative, Negative urine chlymydia/gonorrhea, + throat swab for Gonorrhea    > c/w monitoring   > f/u ID recommendations   > f/u dermatology recommendations, pending punch biopsy results; outpatient follow-up  > f/u Schistosomiasis Ab IgG

## 2025-05-12 NOTE — PROGRESS NOTE ADULT - SUBJECTIVE AND OBJECTIVE BOX
SUBJECTIVE / OVERNIGHT EVENTS:  Pt seen and examined at bedside. EDDI.    MEDICATIONS  (STANDING):  atorvastatin 20 milliGRAM(s) Oral at bedtime  cefTRIAXone   IVPB 2000 milliGRAM(s) IV Intermittent every 24 hours  folic acid 1 milliGRAM(s) Oral daily  lactated ringers. 500 milliLiter(s) (250 mL/Hr) IV Continuous <Continuous>  multivitamin 1 Tablet(s) Oral daily  thiamine 100 milliGRAM(s) Oral daily    MEDICATIONS  (PRN):  acetaminophen     Tablet .. 650 milliGRAM(s) Oral every 6 hours PRN Temp greater or equal to 38C (100.4F), Mild Pain (1 - 3)  benzocaine/menthol Lozenge 1 Lozenge Oral every 2 hours PRN Sore Throat  benzocaine/menthol Lozenge 1 Lozenge Oral three times a day PRN Sore Throat  melatonin 3 milliGRAM(s) Oral at bedtime PRN Insomnia  ondansetron Injectable 4 milliGRAM(s) IV Push every 8 hours PRN Nausea and/or Vomiting        05-11-25 @ 07:01  -  05-12-25 @ 07:00  --------------------------------------------------------  IN: 1000 mL / OUT: 0 mL / NET: 1000 mL        PHYSICAL EXAM:  Vital Signs Last 24 Hrs  T(C): 36.8 (12 May 2025 05:48), Max: 36.9 (11 May 2025 15:34)  T(F): 98.3 (12 May 2025 05:48), Max: 98.5 (11 May 2025 15:34)  HR: 86 (12 May 2025 05:48) (77 - 88)  BP: 100/69 (12 May 2025 05:48) (100/69 - 121/73)  BP(mean): --  RR: 18 (12 May 2025 05:48) (18 - 18)  SpO2: 97% (12 May 2025 05:48) (94% - 97%)    Parameters below as of 12 May 2025 05:48  Patient On (Oxygen Delivery Method): room air        CAPILLARY BLOOD GLUCOSE        I&O's Summary    11 May 2025 07:01  -  12 May 2025 07:00  --------------------------------------------------------  IN: 1000 mL / OUT: 0 mL / NET: 1000 mL    General: NAD, calm, cooperative, overweight habitus   Neuro: AAOx3, 5/5  strength b/l, 5/5 hip flexion/extension, spontaneity, no tongue fascitulations, no asterixis   HEENT: NC/AT, PERRLA b/l, EOMI, nonconjunctival pallor, erythema and edematous tonsils with 1 L sided tonsil stone  Chest: old scar in upper sternum with divet 0.5 cm from old I&D   Heart: S1/S2, RRR   Lungs: CTA b/l, nonlabored breathing   Abd: soft, nonTTP, nondistended   Ext: 1 cm firm dark blood collection lateral aspect of L 4th finger and 3rd middle digit of R hand with wound dressing, scatterd 3-4 simialr lesions on lateral sole aspect of feet b/l   Back: nonTTP   Psych: full range affect, hyperverbal, mutual topic, linear and goal directed thought process    LABS:                        16.0   7.04  )-----------( 178      ( 11 May 2025 07:20 )             49.9     05-11    133[L]  |  101  |  19  ----------------------------<  84  4.4   |  16[L]  |  1.06    Ca    9.3      11 May 2025 07:20            Urinalysis Basic - ( 11 May 2025 07:20 )    Color: x / Appearance: x / SG: x / pH: x  Gluc: 84 mg/dL / Ketone: x  / Bili: x / Urobili: x   Blood: x / Protein: x / Nitrite: x   Leuk Esterase: x / RBC: x / WBC x   Sq Epi: x / Non Sq Epi: x / Bacteria: x        Culture - Wound Aerobic (collected 09 May 2025 21:15)  Source: Other throat  Preliminary Report (11 May 2025 08:01):    Commensal tracy consistent with body site        IMAGING:    [X] All pertinent imaging reviewed by me SUBJECTIVE / OVERNIGHT EVENTS:  Pt seen and examined at bedside. EDDI. Throat pain improved from 90%->50%, no other complaints ; prepared for SYMONE today.    MEDICATIONS  (STANDING):  atorvastatin 20 milliGRAM(s) Oral at bedtime  cefTRIAXone   IVPB 2000 milliGRAM(s) IV Intermittent every 24 hours  folic acid 1 milliGRAM(s) Oral daily  lactated ringers. 500 milliLiter(s) (250 mL/Hr) IV Continuous <Continuous>  multivitamin 1 Tablet(s) Oral daily  thiamine 100 milliGRAM(s) Oral daily    MEDICATIONS  (PRN):  acetaminophen     Tablet .. 650 milliGRAM(s) Oral every 6 hours PRN Temp greater or equal to 38C (100.4F), Mild Pain (1 - 3)  benzocaine/menthol Lozenge 1 Lozenge Oral every 2 hours PRN Sore Throat  benzocaine/menthol Lozenge 1 Lozenge Oral three times a day PRN Sore Throat  melatonin 3 milliGRAM(s) Oral at bedtime PRN Insomnia  ondansetron Injectable 4 milliGRAM(s) IV Push every 8 hours PRN Nausea and/or Vomiting        05-11-25 @ 07:01  -  05-12-25 @ 07:00  --------------------------------------------------------  IN: 1000 mL / OUT: 0 mL / NET: 1000 mL        PHYSICAL EXAM:  Vital Signs Last 24 Hrs  T(C): 36.8 (12 May 2025 05:48), Max: 36.9 (11 May 2025 15:34)  T(F): 98.3 (12 May 2025 05:48), Max: 98.5 (11 May 2025 15:34)  HR: 86 (12 May 2025 05:48) (77 - 88)  BP: 100/69 (12 May 2025 05:48) (100/69 - 121/73)  BP(mean): --  RR: 18 (12 May 2025 05:48) (18 - 18)  SpO2: 97% (12 May 2025 05:48) (94% - 97%)    Parameters below as of 12 May 2025 05:48  Patient On (Oxygen Delivery Method): room air        CAPILLARY BLOOD GLUCOSE        I&O's Summary    11 May 2025 07:01  -  12 May 2025 07:00  --------------------------------------------------------  IN: 1000 mL / OUT: 0 mL / NET: 1000 mL    General: NAD, calm, cooperative, overweight habitus   Neuro: AAOx3, 5/5  strength b/l, 5/5 hip flexion/extension, spontaneity, no tongue fascitulations, no asterixis   HEENT: NC/AT, PERRLA b/l, EOMI, nonconjunctival pallor, erythema and edematous tonsils with 1 L sided tonsil stone  Chest: old scar in upper sternum with divet 0.5 cm from old I&D   Heart: S1/S2, RRR   Lungs: CTA b/l, nonlabored breathing   Abd: soft, nonTTP, nondistended   Ext: 1 cm firm dark blood collection lateral aspect of L 4th finger and 3rd middle digit of R hand with wound dressing, scatterd 3-4 simialr lesions on lateral sole aspect of feet b/l   Back: nonTTP   Psych: full range affect, hyperverbal, mutual topic, linear and goal directed thought process    LABS:                        16.0   7.04  )-----------( 178      ( 11 May 2025 07:20 )             49.9     05-11    133[L]  |  101  |  19  ----------------------------<  84  4.4   |  16[L]  |  1.06    Ca    9.3      11 May 2025 07:20            Urinalysis Basic - ( 11 May 2025 07:20 )    Color: x / Appearance: x / SG: x / pH: x  Gluc: 84 mg/dL / Ketone: x  / Bili: x / Urobili: x   Blood: x / Protein: x / Nitrite: x   Leuk Esterase: x / RBC: x / WBC x   Sq Epi: x / Non Sq Epi: x / Bacteria: x        Culture - Wound Aerobic (collected 09 May 2025 21:15)  Source: Other throat  Preliminary Report (11 May 2025 08:01):    Commensal tracy consistent with body site        IMAGING:    [X] All pertinent imaging reviewed by me

## 2025-05-12 NOTE — PROGRESS NOTE ADULT - PROBLEM SELECTOR PLAN 1
- Patient with pharyngitis with a stone, presented with sepsis.   - Throat PCR positive (05/08) for gonorrhea. Discussed sexual education about safe practices. Precontemplative about PREP. Contemplative about changing sexual practices.   - TTE showing LVEF 59% without RWA nor other valve disease   - Completed doxycycline 100 mg IV q12 (started 5/6-5/10), s/p meropenem 1000 mg IV q8 (started 5/6) -> de-escalated to CTX (5/7-     > F/u wound cx (05/09) to asses growth of gonorrhea: commensal tracy consistent with body site   > F/u Blood Cx (05/08): NGTD  > C/w ceftriaxone 2000 mg IV qd (started 5/9; f/u with ID plans for po abx for discharge pending SYMONE results  > S/p ceftriaxone 1000 mg IV qd (5/7-5/9)  > SYMONE on monday 5/12 to r/o blood cx negative infective endocarditis per ID - Patient with pharyngitis with a stone, presented with sepsis.   - Throat PCR positive (05/08) for gonorrhea. Discussed sexual education about safe practices. Precontemplative about PREP. Contemplative about changing sexual practices.   - TTE showing LVEF 59% without RWA nor other valve disease   - Completed doxycycline 100 mg IV q12 (started 5/6-5/10), s/p meropenem 1000 mg IV q8 (started 5/6) -> de-escalated to CTX (5/7-     > F/u wound cx (05/09) to asses growth of gonorrhea: commensal tracy consistent with body site   > F/u Blood Cx (05/08): NGTD  > C/w ceftriaxone 2000 mg IV qd (1g 05/07-05/09, started 2g 5/9; f/u with ID plans for po abx for discharge pending SYMONE results -> would likely need mid-line and IV Ceftriaxone on discharge   > SYMONE on monday 5/12 to r/o blood cx negative infective endocarditis per ID

## 2025-05-13 ENCOUNTER — TRANSCRIPTION ENCOUNTER (OUTPATIENT)
Age: 55
End: 2025-05-13

## 2025-05-13 VITALS
RESPIRATION RATE: 18 BRPM | DIASTOLIC BLOOD PRESSURE: 75 MMHG | HEART RATE: 84 BPM | SYSTOLIC BLOOD PRESSURE: 121 MMHG | TEMPERATURE: 97 F | OXYGEN SATURATION: 96 %

## 2025-05-13 PROBLEM — A24.9: Chronic | Status: ACTIVE | Noted: 2025-05-06

## 2025-05-13 LAB
CULTURE RESULTS: SIGNIFICANT CHANGE UP
CULTURE RESULTS: SIGNIFICANT CHANGE UP
SCHISTOSOMA IGG SER-ACNC: <1 — SIGNIFICANT CHANGE UP
SPECIMEN SOURCE: SIGNIFICANT CHANGE UP
SPECIMEN SOURCE: SIGNIFICANT CHANGE UP
SURGICAL PATHOLOGY STUDY: SIGNIFICANT CHANGE UP

## 2025-05-13 PROCEDURE — 84295 ASSAY OF SERUM SODIUM: CPT

## 2025-05-13 PROCEDURE — 84484 ASSAY OF TROPONIN QUANT: CPT

## 2025-05-13 PROCEDURE — 86038 ANTINUCLEAR ANTIBODIES: CPT

## 2025-05-13 PROCEDURE — 86780 TREPONEMA PALLIDUM: CPT

## 2025-05-13 PROCEDURE — 80061 LIPID PANEL: CPT

## 2025-05-13 PROCEDURE — 76377 3D RENDER W/INTRP POSTPROCES: CPT

## 2025-05-13 PROCEDURE — 83605 ASSAY OF LACTIC ACID: CPT

## 2025-05-13 PROCEDURE — 87075 CULTR BACTERIA EXCEPT BLOOD: CPT

## 2025-05-13 PROCEDURE — 93306 TTE W/DOPPLER COMPLETE: CPT

## 2025-05-13 PROCEDURE — 82330 ASSAY OF CALCIUM: CPT

## 2025-05-13 PROCEDURE — 82962 GLUCOSE BLOOD TEST: CPT

## 2025-05-13 PROCEDURE — 93312 ECHO TRANSESOPHAGEAL: CPT

## 2025-05-13 PROCEDURE — 85018 HEMOGLOBIN: CPT

## 2025-05-13 PROCEDURE — 87077 CULTURE AEROBIC IDENTIFY: CPT

## 2025-05-13 PROCEDURE — 84132 ASSAY OF SERUM POTASSIUM: CPT

## 2025-05-13 PROCEDURE — 86622 BRUCELLA ANTIBODY: CPT

## 2025-05-13 PROCEDURE — 87637 SARSCOV2&INF A&B&RSV AMP PRB: CPT

## 2025-05-13 PROCEDURE — 85025 COMPLETE CBC W/AUTO DIFF WBC: CPT

## 2025-05-13 PROCEDURE — 76770 US EXAM ABDO BACK WALL COMP: CPT

## 2025-05-13 PROCEDURE — 82533 TOTAL CORTISOL: CPT

## 2025-05-13 PROCEDURE — 86720 LEPTOSPIRA ANTIBODY: CPT

## 2025-05-13 PROCEDURE — 83735 ASSAY OF MAGNESIUM: CPT

## 2025-05-13 PROCEDURE — 96374 THER/PROPH/DIAG INJ IV PUSH: CPT

## 2025-05-13 PROCEDURE — 88312 SPECIAL STAINS GROUP 1: CPT

## 2025-05-13 PROCEDURE — 82550 ASSAY OF CK (CPK): CPT

## 2025-05-13 PROCEDURE — 99285 EMERGENCY DEPT VISIT HI MDM: CPT

## 2025-05-13 PROCEDURE — 87015 SPECIMEN INFECT AGNT CONCNTJ: CPT

## 2025-05-13 PROCEDURE — 36569 INSJ PICC 5 YR+ W/O IMAGING: CPT

## 2025-05-13 PROCEDURE — 86900 BLOOD TYPING SEROLOGIC ABO: CPT

## 2025-05-13 PROCEDURE — 83615 LACTATE (LD) (LDH) ENZYME: CPT

## 2025-05-13 PROCEDURE — 86140 C-REACTIVE PROTEIN: CPT

## 2025-05-13 PROCEDURE — 93325 DOPPLER ECHO COLOR FLOW MAPG: CPT

## 2025-05-13 PROCEDURE — 83880 ASSAY OF NATRIURETIC PEPTIDE: CPT

## 2025-05-13 PROCEDURE — 82570 ASSAY OF URINE CREATININE: CPT

## 2025-05-13 PROCEDURE — 83036 HEMOGLOBIN GLYCOSYLATED A1C: CPT

## 2025-05-13 PROCEDURE — 87040 BLOOD CULTURE FOR BACTERIA: CPT

## 2025-05-13 PROCEDURE — 86618 LYME DISEASE ANTIBODY: CPT

## 2025-05-13 PROCEDURE — 86757 RICKETTSIA ANTIBODY: CPT

## 2025-05-13 PROCEDURE — 84300 ASSAY OF URINE SODIUM: CPT

## 2025-05-13 PROCEDURE — 87102 FUNGUS ISOLATION CULTURE: CPT

## 2025-05-13 PROCEDURE — 87206 SMEAR FLUORESCENT/ACID STAI: CPT

## 2025-05-13 PROCEDURE — 87070 CULTURE OTHR SPECIMN AEROBIC: CPT

## 2025-05-13 PROCEDURE — 85730 THROMBOPLASTIN TIME PARTIAL: CPT

## 2025-05-13 PROCEDURE — 99232 SBSQ HOSP IP/OBS MODERATE 35: CPT

## 2025-05-13 PROCEDURE — 71046 X-RAY EXAM CHEST 2 VIEWS: CPT

## 2025-05-13 PROCEDURE — 80053 COMPREHEN METABOLIC PANEL: CPT

## 2025-05-13 PROCEDURE — 93005 ELECTROCARDIOGRAM TRACING: CPT

## 2025-05-13 PROCEDURE — 84100 ASSAY OF PHOSPHORUS: CPT

## 2025-05-13 PROCEDURE — 86753 PROTOZOA ANTIBODY NOS: CPT

## 2025-05-13 PROCEDURE — 81001 URINALYSIS AUTO W/SCOPE: CPT

## 2025-05-13 PROCEDURE — 93320 DOPPLER ECHO COMPLETE: CPT

## 2025-05-13 PROCEDURE — 87591 N.GONORRHOEAE DNA AMP PROB: CPT

## 2025-05-13 PROCEDURE — 88305 TISSUE EXAM BY PATHOLOGIST: CPT

## 2025-05-13 PROCEDURE — 85014 HEMATOCRIT: CPT

## 2025-05-13 PROCEDURE — 86431 RHEUMATOID FACTOR QUANT: CPT

## 2025-05-13 PROCEDURE — C1751: CPT

## 2025-05-13 PROCEDURE — 86850 RBC ANTIBODY SCREEN: CPT

## 2025-05-13 PROCEDURE — 87491 CHLMYD TRACH DNA AMP PROBE: CPT

## 2025-05-13 PROCEDURE — 86682 HELMINTH ANTIBODY: CPT

## 2025-05-13 PROCEDURE — 86666 EHRLICHIA ANTIBODY: CPT

## 2025-05-13 PROCEDURE — 80048 BASIC METABOLIC PNL TOTAL CA: CPT

## 2025-05-13 PROCEDURE — 85027 COMPLETE CBC AUTOMATED: CPT

## 2025-05-13 PROCEDURE — 80074 ACUTE HEPATITIS PANEL: CPT

## 2025-05-13 PROCEDURE — 84540 ASSAY OF URINE/UREA-N: CPT

## 2025-05-13 PROCEDURE — 82803 BLOOD GASES ANY COMBINATION: CPT

## 2025-05-13 PROCEDURE — 87389 HIV-1 AG W/HIV-1&-2 AB AG IA: CPT

## 2025-05-13 PROCEDURE — 84156 ASSAY OF PROTEIN URINE: CPT

## 2025-05-13 PROCEDURE — 71275 CT ANGIOGRAPHY CHEST: CPT | Mod: MC

## 2025-05-13 PROCEDURE — 82947 ASSAY GLUCOSE BLOOD QUANT: CPT

## 2025-05-13 PROCEDURE — 87116 MYCOBACTERIA CULTURE: CPT

## 2025-05-13 PROCEDURE — 82435 ASSAY OF BLOOD CHLORIDE: CPT

## 2025-05-13 PROCEDURE — 85610 PROTHROMBIN TIME: CPT

## 2025-05-13 PROCEDURE — 86717 LEISHMANIA ANTIBODY: CPT

## 2025-05-13 PROCEDURE — 96375 TX/PRO/DX INJ NEW DRUG ADDON: CPT

## 2025-05-13 PROCEDURE — 36415 COLL VENOUS BLD VENIPUNCTURE: CPT

## 2025-05-13 PROCEDURE — 87086 URINE CULTURE/COLONY COUNT: CPT

## 2025-05-13 PROCEDURE — 86480 TB TEST CELL IMMUN MEASURE: CPT

## 2025-05-13 PROCEDURE — 85652 RBC SED RATE AUTOMATED: CPT

## 2025-05-13 PROCEDURE — 87880 STREP A ASSAY W/OPTIC: CPT

## 2025-05-13 PROCEDURE — 99239 HOSP IP/OBS DSCHRG MGMT >30: CPT | Mod: GC

## 2025-05-13 PROCEDURE — 84443 ASSAY THYROID STIM HORMONE: CPT

## 2025-05-13 PROCEDURE — 83935 ASSAY OF URINE OSMOLALITY: CPT

## 2025-05-13 PROCEDURE — 86036 ANCA SCREEN EACH ANTIBODY: CPT

## 2025-05-13 PROCEDURE — 86901 BLOOD TYPING SEROLOGIC RH(D): CPT

## 2025-05-13 PROCEDURE — 87081 CULTURE SCREEN ONLY: CPT

## 2025-05-13 RX ORDER — FOLIC ACID 1 MG/1
1 TABLET ORAL
Qty: 30 | Refills: 0
Start: 2025-05-13 | End: 2025-06-11

## 2025-05-13 RX ORDER — ATORVASTATIN CALCIUM 80 MG/1
1 TABLET, FILM COATED ORAL
Qty: 30 | Refills: 0
Start: 2025-05-13 | End: 2025-06-11

## 2025-05-13 RX ORDER — CEFTRIAXONE 500 MG/1
2 INJECTION, POWDER, FOR SOLUTION INTRAMUSCULAR; INTRAVENOUS
Qty: 6 | Refills: 0
Start: 2025-05-13 | End: 2025-05-18

## 2025-05-13 RX ORDER — CEFTRIAXONE 500 MG/1
2000 INJECTION, POWDER, FOR SOLUTION INTRAMUSCULAR; INTRAVENOUS EVERY 24 HOURS
Refills: 0 | Status: DISCONTINUED | OUTPATIENT
Start: 2025-05-13 | End: 2025-05-13

## 2025-05-13 RX ADMIN — FOLIC ACID 1 MILLIGRAM(S): 1 TABLET ORAL at 11:41

## 2025-05-13 RX ADMIN — Medication 1 TABLET(S): at 11:41

## 2025-05-13 RX ADMIN — CEFTRIAXONE 100 MILLIGRAM(S): 500 INJECTION, POWDER, FOR SOLUTION INTRAMUSCULAR; INTRAVENOUS at 17:47

## 2025-05-13 RX ADMIN — Medication 100 MILLIGRAM(S): at 11:41

## 2025-05-13 NOTE — PROGRESS NOTE ADULT - ASSESSMENT
54M with history of HTN, HLD, meliodosis, sternal abscess s/p I&D here for subacute multiple symptoms (palpitations, CP, night sweats) and few scattered lesions on fingers and lateral aspect of feet with recent travel exposure and multiple sexual partners in [place]; currently s/p punch biopsy of finger extremities likely disseminated gonorrhea; s/p SYMONE 5/12 without vegetations, pending mid-line and discharge.

## 2025-05-13 NOTE — PROGRESS NOTE ADULT - PROBLEM SELECTOR PLAN 1
- Patient with pharyngitis with a stone, presented with sepsis.   - Throat PCR positive (05/08) for gonorrhea. Discussed sexual education about safe practices. Precontemplative about PREP. Contemplative about changing sexual practices.   - TTE showing LVEF 59% without RWA nor other valve disease   - Completed doxycycline 100 mg IV q12 (started 5/6-5/10), s/p meropenem 1000 mg IV q8 (started 5/6) -> de-escalated to CTX (5/7-     > F/u wound cx (05/09): commensal tracy consistent with body site   > F/u Blood Cx (05/08): NGTD  > C/w ceftriaxone 2000 mg IV qd (1g 05/07-05/09, started 2g 5/9 -> Midline requested by ID for IV Ceftriaxone on discharge x 14D total duration (until 05/19)  > SYMONE on monday 5/12 to r/o blood cx negative infective endocarditis per ID

## 2025-05-13 NOTE — DISCHARGE NOTE NURSING/CASE MANAGEMENT/SOCIAL WORK - NSDCFUADDAPPT_GEN_ALL_CORE_FT
APPTS ARE READY TO BE MADE: [X] YES    Best Family or Patient Contact (if needed):    Additional Information about above appointments (if needed):    1: Please make an appointment with the PCP within 1-2 wks.   2: Please see the infectious disease doctor for follow-up. (Elroy Morales 05/19)  3: Please see the Dermatologist for follow-up    Other comments or requests:

## 2025-05-13 NOTE — PROGRESS NOTE ADULT - PROBLEM SELECTOR PROBLEM 4
At risk for alcohol withdrawal
Skin lesion
At risk for alcohol withdrawal
GERTRUDIS (acute kidney injury)
Skin lesion
At risk for alcohol withdrawal
GERTRUDIS (acute kidney injury)
At risk for alcohol withdrawal

## 2025-05-13 NOTE — ADVANCED PRACTICE NURSE CONSULT - REASON FOR CONSULT
Vascular Access Team    Evaluation for: Bedside Midline placement  Requested by name: Westley Pantoja  Date/Time: 2025-05-13    Indication: ceftriaxone till 5/19  Allergy to CHG or Heparin or Lidocaine: no    Platelets(>20): 343  INR(<3): 1.12  eGFR(>40): 91  Blood cultures sent: na  Anticoagulants/Antiplatelets: none  Arms DVT:  Mastectomy:no  Fistula:no  PPM/Defib:no  IR or Nephrology or ID clearance needed: no    Consent obtained: na    Pending: none    Plan: Bedside midline order evaluated. Please contact YASMANY RN #[phone] with any questions.

## 2025-05-13 NOTE — DISCHARGE NOTE NURSING/CASE MANAGEMENT/SOCIAL WORK - PATIENT PORTAL LINK FT
You can access the FollowMyHealth Patient Portal offered by [facility] by registering at the following website: [email or web address]. By joining [employer]’s FollowMyHealth portal, you will also be able to view your health information using other applications (apps) compatible with our system.

## 2025-05-13 NOTE — DISCHARGE NOTE NURSING/CASE MANAGEMENT/SOCIAL WORK - FINANCIAL ASSISTANCE
[facility] provides services at a reduced cost to those who are determined to be eligible through [facility]’s financial assistance program. Information regarding [facility]’s financial assistance program can be found by going to [email or web address] or by calling [phone].

## 2025-05-13 NOTE — DISCHARGE NOTE NURSING/CASE MANAGEMENT/SOCIAL WORK - NSDCPEFALRISK_GEN_ALL_CORE
For information on Fall & Injury Prevention, visit: [email or web address] OR  [email or web address] OR  https://www.cdc.gov/steadi/patient.html

## 2025-05-13 NOTE — PROGRESS NOTE ADULT - PROBLEM SELECTOR PROBLEM 5
Hyponatremia
At risk for alcohol withdrawal
GERTRUDIS (acute kidney injury)
At risk for alcohol withdrawal
Hyponatremia

## 2025-05-13 NOTE — PROGRESS NOTE ADULT - PROBLEM SELECTOR PLAN 7
- Elevated LFTs after uptitration of atoravstatin.     > c/w downtitrated atoravastatin  40 to 20 mg po qhs 37.1

## 2025-05-13 NOTE — PROGRESS NOTE ADULT - SUBJECTIVE AND OBJECTIVE BOX
SUBJECTIVE / OVERNIGHT EVENTS:  Pt seen and examined at bedside. s/p SYMONE yesterday evening without any vegetations. NAEON. Midline team consulted for placement of midline to receive home IV antibiotics.    MEDICATIONS  (STANDING):  atorvastatin 20 milliGRAM(s) Oral at bedtime  cefTRIAXone   IVPB 2000 milliGRAM(s) IV Intermittent every 24 hours  folic acid 1 milliGRAM(s) Oral daily  lactated ringers. 500 milliLiter(s) (250 mL/Hr) IV Continuous <Continuous>  multivitamin 1 Tablet(s) Oral daily  thiamine 100 milliGRAM(s) Oral daily    MEDICATIONS  (PRN):  acetaminophen     Tablet .. 650 milliGRAM(s) Oral every 6 hours PRN Temp greater or equal to 38C (100.4F), Mild Pain (1 - 3)  benzocaine/menthol Lozenge 1 Lozenge Oral every 2 hours PRN Sore Throat  benzocaine/menthol Lozenge 1 Lozenge Oral three times a day PRN Sore Throat  melatonin 3 milliGRAM(s) Oral at bedtime PRN Insomnia  ondansetron Injectable 4 milliGRAM(s) IV Push every 8 hours PRN Nausea and/or Vomiting        05-12-25 @ 07:01  -  05-13-25 @ 07:00  --------------------------------------------------------  IN: 400 mL / OUT: 150 mL / NET: 250 mL        PHYSICAL EXAM:  Vital Signs Last 24 Hrs  T(C): 36.9 (13 May 2025 06:00), Max: 36.9 (12 May 2025 20:00)  T(F): 98.4 (13 May 2025 06:00), Max: 98.5 (12 May 2025 20:00)  HR: 85 (13 May 2025 06:00) (81 - 88)  BP: 108/74 (13 May 2025 06:00) (108/74 - 130/89)  BP(mean): --  RR: 18 (13 May 2025 06:00) (16 - 18)  SpO2: 98% (13 May 2025 06:00) (96% - 99%)    Parameters below as of 13 May 2025 06:00  Patient On (Oxygen Delivery Method): room air        CAPILLARY BLOOD GLUCOSE      POCT Blood Glucose.: 99 mg/dL (12 May 2025 08:13)    I&O's Summary    12 May 2025 07:01  -  13 May 2025 07:00  --------------------------------------------------------  IN: 400 mL / OUT: 150 mL / NET: 250 mL      General: NAD, calm, cooperative, overweight habitus   Neuro: AAOx3, 5/5  strength b/l, 5/5 hip flexion/extension, spontaneity, no tongue fascitulations, no asterixis   HEENT: NC/AT, PERRLA b/l, EOMI, nonconjunctival pallor, erythema and edematous tonsils with 1 L sided tonsil stone  Chest: old scar in upper sternum with divet 0.5 cm from old I&D   Heart: S1/S2, RRR   Lungs: CTA b/l, nonlabored breathing   Abd: soft, nonTTP, nondistended   Ext: 1 cm firm dark blood collection lateral aspect of L 4th finger and 3rd middle digit of R hand with wound dressing, scatterd 3-4 simialr lesions on lateral sole aspect of feet b/l   Back: nonTTP   Psych: full range affect, hyperverbal, mutual topic, linear and goal directed thought process      LABS:                        14.6   7.86  )-----------( 343      ( 12 May 2025 07:42 )             43.6     05-12    137  |  101  |  17  ----------------------------<  84  4.0   |  19[L]  |  1.00    Ca    9.4      12 May 2025 07:41      PT/INR - ( 12 May 2025 07:42 )   PT: 12.7 sec;   INR: 1.12 ratio         PTT - ( 12 May 2025 07:42 )  PTT:26.6 sec      Urinalysis Basic - ( 12 May 2025 07:41 )    Color: x / Appearance: x / SG: x / pH: x  Gluc: 84 mg/dL / Ketone: x  / Bili: x / Urobili: x   Blood: x / Protein: x / Nitrite: x   Leuk Esterase: x / RBC: x / WBC x   Sq Epi: x / Non Sq Epi: x / Bacteria: x          IMAGING:    [X] All pertinent imaging reviewed by me

## 2025-05-13 NOTE — ADVANCED PRACTICE NURSE CONSULT - REASON FOR CONSULT
Midline Catheter Insertion Note    Catheter type: 4F  : Bard  Power injectable: Yes  LOT# [id number]                                                                                                                                                                                                                        Procedure assisted by: Jenna JADE RN  Time out was preformed, confirming the patient's first and last name, date of birth, procedure, and correct site prior to state of procedure.    Patient was placed with HOB 30 degrees. Patient placement site was prepped with chlorhexidine solution, then draped using maximum sterile barrier protection. The area was injected with 2 ml of 1% lidocaine. Using the Bard Site Rite 8, the catheter was placed using the Modified Seldinger Technique. Strict adherence to outline aseptic technique including handwashing, glove and gown, utilizing mask and cap, plus draping the patient with a sterile drape was observed. Upon completion of line placement, the insertion site was covered with a sterile occlusive CHG dressing. Pt tolerated procedure well.     All materials used for catheter insertion, including the intact guide wires, were accounted for at the end of the procedure.  Number of attempts: 2  Complications/Comments: Attempted midline placement in left arm. Unable to advance catheter into left basilic vein. Successfully placed midline in right basilic vein.    Emergency Placement: No    Site: New  Anatomical Site of insertion: Right Basilic  Catheter size/length: 4F, 20cm  US guided Bard single lumen power midline placed

## 2025-05-13 NOTE — PROGRESS NOTE ADULT - PROBLEM SELECTOR PLAN 3
- Scattered lesions on fingers/lateral soles of feet. Unclear etiology. Recent splinter and mosquito exposure. no recent sexual activity with wife. Recent chemical/water party exposure to feet from salon, but not to hands. DDx STI vs zoonotic infection vs rheumatic fever   - Negative Brucella, leishmaniasis Ab, leptospiorosis Ab , timothy mountain spotted fever IgG/IgM, Indeterminate atypical ANCA, HIV and RPR negative, Negative urine chlymydia/gonorrhea, + throat swab for Gonorrhea    > c/w monitoring   > f/u ID recommendations   > f/u dermatology recommendations, pending punch biopsy results; outpatient follow-up  > f/u Schistosomiasis Ab IgG

## 2025-05-13 NOTE — PROGRESS NOTE ADULT - ASSESSMENT
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    1) New skin lesions to hands/ feet  He has a history of melioidoses  These lesions seem atypical for melioidoses    TTE without vegetation   Urine GC/chalmydia negative  Syphillis screen negative  RMSF serology negative   SYMONE negative    High inflammatory markers    Pharygeal PCR positive for GC  ? Disseminated GC as cause of skin lesions  Await SYMONE    continue Ceftriaxone 2 gram iv daily through 5/19  No need for outpatient labs    Follow up derm biopsy results    2) Sore throat  due to GC  On ceftriaxone  Check culture of throat- I spoke with the lab and  asked them to try to isolate GC    3) PREP discussion  I discussed PREP role in prevention of HIV  Pt did not want to start at this time  I will continue to discuss with him      Follow up as an outpatient:  [address]  [place], [place] [id number] [phone]   Appointment on: 5/19 at 1 pm

## 2025-05-13 NOTE — PROGRESS NOTE ADULT - SUBJECTIVE AND OBJECTIVE BOX
Follow Up:      Inverval History/ROS:Patient is a 54y old  Male who presents with a chief complaint of chest pain, fevers (13 May 2025 07:18)    S/p SYMONE  No fever    Allergies    No Known Allergies    Intolerances        ANTIMICROBIALS:  cefTRIAXone   IVPB 2000 every 24 hours      OTHER MEDS:  acetaminophen     Tablet .. 650 milliGRAM(s) Oral every 6 hours PRN  atorvastatin 20 milliGRAM(s) Oral at bedtime  benzocaine/menthol Lozenge 1 Lozenge Oral three times a day PRN  folic acid 1 milliGRAM(s) Oral daily  melatonin 3 milliGRAM(s) Oral at bedtime PRN  multivitamin 1 Tablet(s) Oral daily  thiamine 100 milliGRAM(s) Oral daily      Vital Signs Last 24 Hrs  T(C): 36.9 (13 May 2025 06:00), Max: 36.9 (12 May 2025 20:00)  T(F): 98.4 (13 May 2025 06:00), Max: 98.5 (12 May 2025 20:00)  HR: 85 (13 May 2025 06:00) (81 - 88)  BP: 108/74 (13 May 2025 06:00) (108/74 - 130/89)  BP(mean): --  RR: 18 (13 May 2025 06:00) (16 - 18)  SpO2: 98% (13 May 2025 06:00) (96% - 99%)    Parameters below as of 13 May 2025 06:00  Patient On (Oxygen Delivery Method): room air        PHYSICAL EXAM:  General: [ ] non-toxic  HEAD/EYES: [ ] PERRL [x ] white sclera [ ] icterus  ENT:  [ ] normal [x ] supple [ ] thrush [ ] pharyngeal exudate  Cardiovascular:   [ ] murmur [x ] normal [ ] PPM/AICD  Respiratory:  [ x] clear to ausculation bilaterally  GI:  [x ] soft, non-tender, normal bowel sounds  :  [ ] kent [ x] no CVA tenderness   Musculoskeletal:  [ ] no synovitis  Neurologic:  [ ] non-focal exam   Skin:  [x ] no rash  Lymph: [ ] no lymphadenopathy  Psychiatric:  x[ ] appropriate affect [ ] alert & oriented  Lines:  [x ] no phlebitis [ ] central line                                14.6   7.86  )-----------( 343      ( 12 May 2025 07:42 )             43.6       05-12    137  |  101  |  17  ----------------------------<  84  4.0   |  19[L]  |  1.00    Ca    9.4      12 May 2025 07:41        Urinalysis Basic - ( 12 May 2025 07:41 )    Color: x / Appearance: x / SG: x / pH: x  Gluc: 84 mg/dL / Ketone: x  / Bili: x / Urobili: x   < from: SYMONE W or WO Ultrasound Enhancing Agent (05.12.25 @ 17:21) >  CONCLUSIONS:      1. SYMONE performed to evaluate for valvular vegetations.   2. There is no evidence of a valvular vegetation.   3. Left ventricular cavity is normal in size. Left ventricular systolic function is normal with an ejection fraction of 52 % by 3D.   4. Normal right ventricular cavity size and normal right ventricular systolic function.   5. No pericardial effusion seen.    < end of copied text >  Blood: x / Protein: x / Nitrite: x   Leuk Esterase: x / RBC: x / WBC x   Sq Epi: x / Non Sq Epi: x / Bacteria: x        MICROBIOLOGY:Culture Results:   Commensal tracy consistent with body site (05-09-25 @ 21:15)  Culture Results:   No growth at 4 days (05-08-25 @ 07:29)  Culture Results:   No growth at 4 days (05-08-25 @ 07:29)  Culture Results:   Culture is being performed. (05-07-25 @ 18:57)  Culture Results:   No growth (05-07-25 @ 18:57)  Culture Results:   No growth (05-07-25 @ 18:57)  Culture Results:   No Streptococcus pyogenes (Group A) isolated (05-07-25 @ 12:23)      RADIOLOGY:

## 2025-05-13 NOTE — PROGRESS NOTE ADULT - REASON FOR ADMISSION
chest pain, fevers

## 2025-05-13 NOTE — PROGRESS NOTE ADULT - ATTENDING COMMENTS
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 where he reports multiple sexual partners p/w fatigue fever and new lesions on b/l hands and feet, with throat culture + for gonorrhea. Bcx negative. SYMONE negative for vegetation. started on CTX with improvement. Stable for d/c to complete CTX on 5/19. Pending Midline and abx set up for outpatient. d/c time spent by provider 46 min.
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 [place] where he reports multiple sexual partners and participating in water festival presents to ED 5/5 with fatigue, palpitations, and rash with scattered lesions on fingers and lateral aspect of feet, meeting sepsis criteria with leukocytosis, tachycardia, on empiric abx unclear etiology as rash not c/w prior meliodosis, HIV/RPR/chlamydia negative, ANCA testing and derm punch biopsy pending.    1.  Sepsis: Reported fever at home, however no fever in hospital; arrived with leukocytosis WBC 12 and tachycardia. Recent travel exposure and development of nonspecific symptoms and dark lesions on fingers/hands. History of meliodosis on sternal abscess culture that required meropenem and TMP-SMX eradication therapy-CT chest shows resolution of abscess. blood cx/urine culture: no growth.  TTE unremarkable.  HIV/rpr/chlamydia testing negative.  Still febrile overnight.  -appreciate ID c/s.  Unclear etiology, as rash not consistent with prior meliodosis.    -reports throat pain with elevated Centor however appears to have tonsilar stone.  Check strep though doubt source of rash and has been on abx with strep coverage.  -C/w doxycycline 100 mg IV q12 (started 5/6)   -meropenem 1000 mg IV q8 (started 5/6-5/7). Change to ceftriaxone 5/7 per ID  -derm c/s: s/p punch biopsy 5/7-results pending  -still febrile overnight: check hep panel, LDH, RF, SANTIAOG, quant gold  -f/u anca    2. Skin lesions: Scattered lesions on fingers/lateral soles of feet. Unclear etiology.  Multiple sexual partners reported to resident and Recent chemical exposure to feet from pedicure and water party exposure in [facility] [place]. Not painful.  -c/s derm  -F/u brucella IgG/IgM, leishmaniasis Ab, leptospiorosis Ab , timothy mountain spotted fever IgG/IgM-negative, schihistoma Ab IgG,  -Pending gonorrhea testing.  -ANCA testing for vasculitis pending  -CRP/ESR elevated    3. ETOH use disorder:  CIWA 0-multiple days since last drink.  D/c ciwa  multivit/thiamine/folate    4. GERTRUDIS: Baseline SCr 0.9-1.0. SCr 2.12 on admission, now improved, c/w prerenal.    5. hyponatremia: Urine osmolality 669 and Na 73.   -Encourage po intake.  -suspect SIADH  -fluid restrict, liberal diet  -tsh wnl  f/u am cortisol    6. Palpitations: Likely related to sepsis. No PE on CTA chest. EKG sinus tachycardia, non ischemic. Troponins WNL.  Less likely etoh withdrawal given time from last drink and ciwa 0.  Resolved as well    7. HTN: holding home ARB with gertrudis and low normal bp    contact: TEAMS .
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   - s/p punch biopsy 5/7-f/u results outpatient  - remainder of infectious w/u negative, continue CTX for GC. f/u SYMONE but will likely require CTX on d/c regardless
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) s/p I&D and wound vac 2023, recently returned from [place] where he reports multiple sexual partners and participating in water festival presents to ED 5/5 with fatigue, palpitations, and rash with scattered lesions on fingers and lateral aspect of feet, meeting sepsis criteria with leukocytosis, tachycardia, on empiric abx unclear etiology as rash not c/w prior meliodosis, HIV/RPR/chlamydia negative, ANCA negative and derm punch biopsy pending will hold ceftriaxone today as no clear source, complete 5 full days of doxy 5/10 and plan for SYMONE on 5/12 to r/o culture negative endocarditis per discussion with ID Dr Morales.    1.  Sepsis: Reported fever at home, however no fever in hospital; arrived with leukocytosis WBC 12 and tachycardia. Recent travel exposure and development of nonspecific symptoms and dark lesions on fingers/hands. History of meliodosis on sternal abscess culture that required meropenem and TMP-SMX eradication therapy-CT chest shows resolution of abscess. blood cx/urine culture: no growth.  TTE unremarkable.  HIV/rpr/chlamydia testing negative.  Strep negative.  RF negative.  pANCA/cANCA negative. Atypical ANCA indeterminate.  AM cortisol normal.  Last fever 5/7.  -appreciate ID c/s.  Unclear etiology, as rash not consistent with prior meliodosis.    -Per d/w Dr Morales, C/w doxycycline 100 mg IV q12 (started 5/6) to complete 5 full days for ricketsia as there are non testable strains other than timothy mountain (negative).  Last dose 5/10 AM.   -meropenem 1000 mg IV q8 (started 5/6-5/7). Ceftriaxone 5/7-5/9.  Holding as blood cultures remain negative  -derm c/s: s/p punch biopsy 5/7-results pending  -f/u: hep panel, LDH, ASNTIAGO, quant gold  -Dr Morales concerned about the SIRS/rash on admission without clear diagnosis at this point.  Plan for doxy until tomorrow AM, will then monitor OFF abx and plan for SYMONE on Monday to r/o culture negative endocarditis. If SYMONE negative and remains afebrile will be able to d/c home post SYMONE on Monday.    2. Skin lesions: Scattered lesions on fingers/lateral soles of feet. Unclear etiology.  Multiple sexual partners reported to resident and Recent chemical exposure to feet from pedicure and water party exposure in [place]. Not painful.  -c/s derm  -F/u brucella IgG/IgM, leishmaniasis Ab, leptospiorosis Ab , timothy mountain spotted fever IgG/IgM-negative, schihistoma Ab IgG,  -Pending gonorrhea testing.  -ANCA testing for vasculitis negative  -CRP/ESR elevated    3. ETOH use disorder:  CIWA 0-multiple days since last drink.  D/c ciwa  multivit/thiamine/folate    4. GERTRUDIS: Baseline SCr 0.9-1.0. SCr 2.12 on admission, now improved, c/w prerenal.    5. hyponatremia: Urine osmolality 669 and Na 73.   -Encourage po intake.  -suspect SIADH  -fluid restrict, liberal diet  -tsh, am cortisol wnl    6. Palpitations: Likely related to sepsis. No PE on CTA chest. EKG sinus tachycardia, non ischemic. Troponins WNL.  Less likely etoh withdrawal given time from last drink and ciwa 0.  Resolved as well    7. HTN: holding home ARB with gertrudis and low normal bp    contact: TEAMS .
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) s/p I&D and wound vac 2023, recently returned from [facility] where he reports multiple sexual partners and participating in water festival presents to ED 5/5 with fatigue, palpitations, and rash with scattered lesions on fingers and lateral aspect of feet, meeting sepsis criteria with leukocytosis, tachycardia, on empiric abx unclear etiology as rash not c/w prior meliodosis, HIV/RPR/chlamydia negative, ANCA negative and derm punch biopsy pending will hold ceftriaxone today as no clear source, complete 5 full days of doxy 5/10 and plan for SYMONE on 5/12 to r/o culture negative endocarditis per discussion with ID Dr Morales.  - derm c/s: s/p punch biopsy 5/7-results pending  - GC - CTx 2g qD  - hyponatremia stable
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) s/p I&D and wound vac 2023, recently returned from [facility] where he reports multiple sexual partners and participating in water festival presents to ED 5/5 with fatigue, palpitations, and rash with scattered lesions on fingers and lateral aspect of feet, meeting sepsis criteria with leukocytosis, tachycardia, on empiric abx unclear etiology as rash not c/w prior meliodosis, HIV/RPR/chlamydia negative, ANCA negative and derm punch biopsy pending will hold ceftriaxone today as no clear source, complete 5 full days of doxy 5/10 and plan for SYMONE on 5/12 to r/o culture negative endocarditis per discussion with ID Dr Morales.  - derm c/s: s/p punch biopsy 5/7-results pending  - GC - CTx 2g qD  - hyponatremia stable .
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    1.  Sepsis: Reported fever at home, however no fever in hospital; arrived with leukocytosis WBC 12 and tachycardia. Recent travel exposure and development of nonspecific symptoms and dark lesions on fingers/hands. History of meliodosis on sternal abscess culture that required meropenem and TMP-SMX eradication therapy-CT chest shows resolution of abscess.   -Pending ID consult given travel history and meliodosis history  -F/u blood cx   -C/w doxycycline 100 mg IV q12 (started 5/6)   -C/w meropenem 1000 mg IV q8 (started 5/6).    2. Skin lesions: Scattered lesions on fingers/lateral soles of feet. Unclear etiology.  Multiple sexual partners reported to resident and Recent chemical exposure to feet from pedicure and water party exposure in [facility]. Not painful.  -f/u ID  -F/u brucella IgG/IgM, leishmaniasis Ab, leptospiorosis Ab , timothy mountain spotted fever IgG/IgM, schihistoma Ab IgG,  -Pending HIV, syphilis, urine chlymydia/gonorrhea.    3. ETOH use disorder:  CIWA 0-multiple days since last drink.  D/c ciwa  multivit/thiamine/folate    4. GERTRUDIS: Baseline SCr 0.9-1.0. SCr 2.12 on admission, currently downtrending to 1.32. DDx: prerenal vs ATN vs ibuprofen.   -500mL NS bolus and trend creatinine daily    5. hyponatremia: Urine osmolality 669 and Na 73.   -Encourage po intake.  -suspect SIADH    6. Palpitations: Likely related to sepsis. No PE on CTA chest. EKG sinus tachycardia, non ischemic. Troponins WNL.  Less likely etoh withdrawal given time from last drink and ciwa 0.  Resolved as well    7. HTN: holding home ARB with gertrudis and low normal bp    contact: TEAMS
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 where he reports multiple sexual partners and participating in water festival presents to ED 5/5 with fatigue, palpitations, and rash with scattered lesions on fingers and lateral aspect of feet, meeting sepsis criteria with leukocytosis, tachycardia, on empiric abx unclear etiology as rash not c/w prior meliodosis, HIV/RPR/chlamydia negative, ANCA testing and derm eval pending.    1.  Sepsis: Reported fever at home, however no fever in hospital; arrived with leukocytosis WBC 12 and tachycardia. Recent travel exposure and development of nonspecific symptoms and dark lesions on fingers/hands. History of meliodosis on sternal abscess culture that required meropenem and TMP-SMX eradication therapy-CT chest shows resolution of abscess. blood cx/urine culture: no growth.  TTE unremarkable.  HIV/rpr/chlamydia testing negative.  Still febrile overnight.  -appreciate ID c/s.  Unclear etiology, as rash not consistent with prior meliodosis.    -reports throat pain with elevated Centor however appears to have tonsilar stone.  Check strep though doubt source of rash and has been on abx with strep coverage.  -C/w doxycycline 100 mg IV q12 (started 5/6)   -meropenem 1000 mg IV q8 (started 5/6-5/7). Change to ceftriaxone 5/7 per ID  -derm c/s to consider biopsy    2. Skin lesions: Scattered lesions on fingers/lateral soles of feet. Unclear etiology.  Multiple sexual partners reported to resident and Recent chemical exposure to feet from pedicure and water party exposure in [facility] [place]. Not painful.  -c/s derm  -F/u brucella IgG/IgM, leishmaniasis Ab, leptospiorosis Ab , timothy mountain spotted fever IgG/IgM, schihistoma Ab IgG,  -Pending gonorrhea testing.  -ANCA testing for vasculitis pending  -CRP elevated    3. ETOH use disorder:  CIWA 0-multiple days since last drink.  D/c ciwa  multivit/thiamine/folate    4. GERTRUDIS: Baseline SCr 0.9-1.0. SCr 2.12 on admission, now improved, c/w prerenal.    5. hyponatremia: Urine osmolality 669 and Na 73.   -Encourage po intake.  -suspect SIADH  -fluid restrict, liberal diet  -check tsh, am cortisol    6. Palpitations: Likely related to sepsis. No PE on CTA chest. EKG sinus tachycardia, non ischemic. Troponins WNL.  Less likely etoh withdrawal given time from last drink and ciwa 0.  Resolved as well    7. HTN: holding home ARB with gertrudis and low normal bp    contact: TEAMS .

## 2025-05-13 NOTE — PROGRESS NOTE ADULT - TIME BILLING
reviewing emr, SYMONE, coordination of care, discussion with patient, documentation. Time spent excludes teaching services.
reviewing emr, labs, coordination of care, discussion with patient, documentation. Time spent excludes teaching services.
- Ordering, reviewing, and interpreting labs, testing, and imaging.  - Independently obtaining a review of systems and performing a physical exam  - Reviewing consultant documentation/recommendations in addition to discussing plan of care with consultants.  - Counselling and educating patient and family regarding interpretation of aforementioned items and plan of care.
- Ordering, reviewing, and interpreting labs, testing, and imaging.  - Independently obtaining a review of systems and performing a physical exam  - Reviewing consultant documentation/recommendations in addition to discussing plan of care with consultants.  - Counselling and educating patient and family regarding interpretation of aforementioned items and plan of care.

## 2025-05-13 NOTE — PROGRESS NOTE ADULT - PROVIDER SPECIALTY LIST ADULT
Infectious Disease
Internal Medicine
Infectious Disease
Internal Medicine

## 2025-05-14 ENCOUNTER — NON-APPOINTMENT (OUTPATIENT)
Age: 55
End: 2025-05-14

## 2025-05-14 LAB
A PHAGOCYTOPH IGG TITR SER IF: SIGNIFICANT CHANGE UP
B BURGDOR AB SER QL IA: 1.22 IV — HIGH
B MICROTI IGG TITR SER: SIGNIFICANT CHANGE UP
CULTURE RESULTS: SIGNIFICANT CHANGE UP
E CHAFFEENSIS IGG TITR SER IF: SIGNIFICANT CHANGE UP
SPECIMEN SOURCE: SIGNIFICANT CHANGE UP

## 2025-05-19 ENCOUNTER — APPOINTMENT (OUTPATIENT)
Dept: INFECTIOUS DISEASE | Facility: CLINIC | Age: 55
End: 2025-05-19
Payer: MEDICAID

## 2025-05-19 ENCOUNTER — OUTPATIENT (OUTPATIENT)
Dept: OUTPATIENT SERVICES | Facility: HOSPITAL | Age: 55
LOS: 1 days | End: 2025-05-19
Payer: MEDICAID

## 2025-05-19 VITALS
TEMPERATURE: 98.2 F | DIASTOLIC BLOOD PRESSURE: 70 MMHG | OXYGEN SATURATION: 96 % | SYSTOLIC BLOOD PRESSURE: 121 MMHG | HEART RATE: 76 BPM | HEIGHT: 64 IN | BODY MASS INDEX: 32.44 KG/M2 | WEIGHT: 190 LBS

## 2025-05-19 DIAGNOSIS — B99.9 UNSPECIFIED INFECTIOUS DISEASE: ICD-10-CM

## 2025-05-19 DIAGNOSIS — J02.9 ACUTE PHARYNGITIS, UNSPECIFIED: ICD-10-CM

## 2025-05-19 DIAGNOSIS — A54.9 GONOCOCCAL INFECTION, UNSPECIFIED: ICD-10-CM

## 2025-05-19 DIAGNOSIS — B97.89 OTHER VIRAL AGENTS AS THE CAUSE OF DISEASES CLASSIFIED ELSEWHERE: ICD-10-CM

## 2025-05-19 PROCEDURE — G0463: CPT

## 2025-05-19 PROCEDURE — 99215 OFFICE O/P EST HI 40 MIN: CPT

## 2025-05-19 NOTE — CHART NOTE - NSCHARTNOTEFT_GEN_A_CORE
throat swab- positive for GC    Resume ceftriaxone    Send throat culture for GC
Writer examined pt at bedside in light of GC throat swab+ result. Skin lesions remain stable. Discussed possibility that skin lesions can be a cutaneous manifestation of disseminated gonorrhea. H&E result from skin biopsy still pending. Pt expressed understanding.     Later received preliminary biopsy result showing nonspecific pustular lesion with hemorrhage, which can be correlated clinically with disseminated gonorrhea. Will follow final result.     Morgan Gonzalez MD  Resident Physician, PGY2  [facility]  Pager: [phone]  Office: [phone]
Post-Discharge Medication Review: Completed	  	  Patient's preferred pharmacy was updated in OMR: [facility] - [phone]	  	  Patient contacted to offer medication counseling post-discharge. Medication reconciliation completed. Per patient, medications include:	  	  1.	atorvastatin 20 mg oral tablet 1 tab(s) orally once a day (at bedtime)  2.	folic acid 1 mg oral tablet 1 tab(s) orally once a day  3.	thiamine 100 mg oral tablet 1 tab(s) orally once a day   	  	  Medications removed from OMR (updated per discussion with patient):	  1.	cefTRIAXone 2 g/50 mL-iso-osmotic dextrose intravenous solution 2 gram(s) intravenously once a day Please take daily from 5/14-5/19  2.	Ceftriaxone 2 grams intravenously, once daily, until 05/19/2025 Ceftriaxone 2 grams intravenously, once daily, until 05/19/2025  	  Medication name, indication, administration, side effect, and monitoring reviewed for new medications during post discharge counseling visit with patient. Patient demonstrated understanding. Counseling offered for all medications.	    Avinash Bates, CarenD	  Clinical Pharmacy Specialist, Pharmacy Telehealth Team	  Can be reached via MS Teams or [phone]

## 2025-05-20 DIAGNOSIS — B99.9 UNSPECIFIED INFECTIOUS DISEASE: ICD-10-CM

## 2025-05-20 DIAGNOSIS — A54.9 GONOCOCCAL INFECTION, UNSPECIFIED: ICD-10-CM

## 2025-05-20 DIAGNOSIS — J02.9 ACUTE PHARYNGITIS, UNSPECIFIED: ICD-10-CM

## 2025-05-21 ENCOUNTER — NON-APPOINTMENT (OUTPATIENT)
Age: 55
End: 2025-05-21

## 2025-05-21 LAB
C3 SERPL-MCNC: 145 MG/DL
C4 SERPL-MCNC: 30 MG/DL
CH50 SERPL-MCNC: 59 U/ML

## 2025-05-22 LAB — SC5B9 SERPL IA-MCNC: 159.2 NG/ML

## 2025-05-30 LAB
C1Q SERPL-MCNC: 18.5 MG/DL
C2 SERPL-MCNC: 2.1 MG/DL
C5 SERPL-MCNC: 35 MG/DL
C6 SERPL-MCNC: 58 U/ML
C7 SERPL-MCNC: 58 U/ML
C8 SERPL-MCNC: 54 U/ML
C9 SERPL-MCNC: 56 U/ML
COMPLEMENT C5.FUNCTIONAL [UNITS/VOLUME] IN SERUM OR PLASMA: 48 U/ML
COMPLEMENT, ALTERNATE PATHWAY (AH50): 110 %
N GONORRHOEA SPEC QL CULT: NORMAL

## 2025-06-04 ENCOUNTER — NON-APPOINTMENT (OUTPATIENT)
Age: 55
End: 2025-06-04

## 2025-06-04 LAB
CULTURE RESULTS: SIGNIFICANT CHANGE UP
SPECIMEN SOURCE: SIGNIFICANT CHANGE UP

## 2025-06-20 ENCOUNTER — APPOINTMENT (OUTPATIENT)
Dept: DERMATOLOGY | Facility: CLINIC | Age: 55
End: 2025-06-20
Payer: MEDICAID

## 2025-06-20 VITALS — BODY MASS INDEX: 32.44 KG/M2 | WEIGHT: 190 LBS | HEIGHT: 64 IN

## 2025-06-20 PROCEDURE — 99213 OFFICE O/P EST LOW 20 MIN: CPT

## (undated) DEVICE — DRAPE 3/4 SHEET 52X76"

## (undated) DEVICE — DRSG TEGADERM 2.5X3"

## (undated) DEVICE — WARMING BLANKET FULL UNDERBODY

## (undated) DEVICE — ELCTR GROUNDING PAD ADULT COVIDIEN

## (undated) DEVICE — SUT MONOCRYL 4-0 27" PS-2 UNDYED

## (undated) DEVICE — DRSG CURITY GAUZE SPONGE 4 X 4" 12-PLY

## (undated) DEVICE — DRSG BENZOIN 0.6CC

## (undated) DEVICE — DRAPE MAGNETIC INSTRUMENT MEDIUM

## (undated) DEVICE — DRSG STERISTRIPS 0.5 X 4"

## (undated) DEVICE — POSITIONER STRAP ARMBOARD VELCRO TS-30

## (undated) DEVICE — GLV 8 PROTEXIS (WHITE)

## (undated) DEVICE — PACK GENERAL MINOR

## (undated) DEVICE — VENODYNE/SCD SLEEVE CALF MEDIUM

## (undated) DEVICE — DURABLE MEDICAL EQUIPMENT: Type: DURABLE MEDICAL EQUIPMENT

## (undated) DEVICE — DRAPE IOBAN 23" X 17"

## (undated) DEVICE — ELCTR BOVIE TIP BLADE INSULATED 2.75" EDGE

## (undated) DEVICE — SUT VICRYL 3-0 27" SH UNDYED

## (undated) DEVICE — DRAPE GENERAL ENDOSCOPY